# Patient Record
Sex: MALE | Race: WHITE | NOT HISPANIC OR LATINO | Employment: OTHER | URBAN - METROPOLITAN AREA
[De-identification: names, ages, dates, MRNs, and addresses within clinical notes are randomized per-mention and may not be internally consistent; named-entity substitution may affect disease eponyms.]

---

## 2019-05-09 NOTE — NURSING
Total Joint Replacement Questionnaire     Junior Beatty participated in Joint Class May 8    1. Have you ever had a Joint Replacement?   [x]Yes  [] No    2. How many stairs/steps do you have to enter your home? 9  When going up, on what side is the railing?  [] Left  [x] Right  [] Bilateral  [] None    3. Do you own any Durable Medical Equipment?   [x] Rolling Walker  [x] Standard Walker  [x] Rollator  [x] Cane  [] Crutches  [x] Bed Side Commode  [x] Hip Kit  [] Tub Transfer Bench  [x] Shower Chair     4. Have you used a Home Health Company before?   [] Yes  [x] No  If Yes, Name of Company: na  Would you like to use this company again?   [] Yes  [] No  [x] Would you like to use your physician's preference?  [x] Yes  [] No    5. Have you arranged for someone to help you, for at least for 3 days, when you are discharged from the hospital?  [x] Yes  [] No  If Yes, Name(s) of person assisting: Anahi Corrales/Nimo Fong  5/9/2019

## 2019-05-16 ENCOUNTER — HOSPITAL ENCOUNTER (OUTPATIENT)
Dept: PREADMISSION TESTING | Facility: OTHER | Age: 59
Discharge: HOME | End: 2019-05-16
Attending: ORTHOPAEDIC SURGERY
Payer: COMMERCIAL

## 2019-05-16 ENCOUNTER — ANESTHESIA EVENT (OUTPATIENT)
Dept: SURGERY | Facility: OTHER | Age: 59
DRG: 470 | End: 2019-05-16
Payer: COMMERCIAL

## 2019-05-16 VITALS
OXYGEN SATURATION: 95 % | SYSTOLIC BLOOD PRESSURE: 130 MMHG | HEIGHT: 65 IN | HEART RATE: 79 BPM | TEMPERATURE: 97 F | WEIGHT: 209 LBS | DIASTOLIC BLOOD PRESSURE: 86 MMHG | BODY MASS INDEX: 34.82 KG/M2

## 2019-05-16 DIAGNOSIS — M16.11 PRIMARY LOCALIZED OSTEOARTHROSIS OF THE HIP, RIGHT: Primary | ICD-10-CM

## 2019-05-16 LAB
ABO + RH BLD: NORMAL
ALBUMIN SERPL BCP-MCNC: 4.1 G/DL (ref 3.5–5.2)
ALP SERPL-CCNC: 108 U/L (ref 55–135)
ALT SERPL W/O P-5'-P-CCNC: 36 U/L (ref 10–44)
ANION GAP SERPL CALC-SCNC: 7 MMOL/L (ref 8–16)
AST SERPL-CCNC: 29 U/L (ref 10–40)
BASOPHILS # BLD AUTO: 0.02 K/UL (ref 0–0.2)
BASOPHILS NFR BLD: 0.4 % (ref 0–1.9)
BILIRUB SERPL-MCNC: 0.6 MG/DL (ref 0.1–1)
BILIRUB UR QL STRIP: NEGATIVE
BLD GP AB SCN CELLS X3 SERPL QL: NORMAL
BUN SERPL-MCNC: 16 MG/DL (ref 6–20)
CALCIUM SERPL-MCNC: 9.5 MG/DL (ref 8.7–10.5)
CHLORIDE SERPL-SCNC: 108 MMOL/L (ref 95–110)
CLARITY UR: CLEAR
CO2 SERPL-SCNC: 26 MMOL/L (ref 23–29)
COLOR UR: YELLOW
CREAT SERPL-MCNC: 0.7 MG/DL (ref 0.5–1.4)
DIFFERENTIAL METHOD: NORMAL
EOSINOPHIL # BLD AUTO: 0.1 K/UL (ref 0–0.5)
EOSINOPHIL NFR BLD: 2.5 % (ref 0–8)
ERYTHROCYTE [DISTWIDTH] IN BLOOD BY AUTOMATED COUNT: 14 % (ref 11.5–14.5)
EST. GFR  (AFRICAN AMERICAN): >60 ML/MIN/1.73 M^2
EST. GFR  (NON AFRICAN AMERICAN): >60 ML/MIN/1.73 M^2
GLUCOSE SERPL-MCNC: 102 MG/DL (ref 70–110)
GLUCOSE UR QL STRIP: NEGATIVE
HCT VFR BLD AUTO: 45.8 % (ref 40–54)
HGB BLD-MCNC: 15.4 G/DL (ref 14–18)
HGB UR QL STRIP: NEGATIVE
KETONES UR QL STRIP: NEGATIVE
LEUKOCYTE ESTERASE UR QL STRIP: NEGATIVE
LYMPHOCYTES # BLD AUTO: 1.8 K/UL (ref 1–4.8)
LYMPHOCYTES NFR BLD: 32.7 % (ref 18–48)
MCH RBC QN AUTO: 28.6 PG (ref 27–31)
MCHC RBC AUTO-ENTMCNC: 33.6 G/DL (ref 32–36)
MCV RBC AUTO: 85 FL (ref 82–98)
MONOCYTES # BLD AUTO: 0.6 K/UL (ref 0.3–1)
MONOCYTES NFR BLD: 10.1 % (ref 4–15)
NEUTROPHILS # BLD AUTO: 3 K/UL (ref 1.8–7.7)
NEUTROPHILS NFR BLD: 54.1 % (ref 38–73)
NITRITE UR QL STRIP: NEGATIVE
PH UR STRIP: 7 [PH] (ref 5–8)
PLATELET # BLD AUTO: 204 K/UL (ref 150–350)
PMV BLD AUTO: 9.2 FL (ref 9.2–12.9)
POTASSIUM SERPL-SCNC: 4 MMOL/L (ref 3.5–5.1)
PROT SERPL-MCNC: 7 G/DL (ref 6–8.4)
PROT UR QL STRIP: NEGATIVE
RBC # BLD AUTO: 5.38 M/UL (ref 4.6–6.2)
RH BLD: NORMAL
SODIUM SERPL-SCNC: 141 MMOL/L (ref 136–145)
SP GR UR STRIP: <=1.005 (ref 1–1.03)
URN SPEC COLLECT METH UR: ABNORMAL
UROBILINOGEN UR STRIP-ACNC: NEGATIVE EU/DL
WBC # BLD AUTO: 5.54 K/UL (ref 3.9–12.7)

## 2019-05-16 PROCEDURE — 85025 COMPLETE CBC W/AUTO DIFF WBC: CPT

## 2019-05-16 PROCEDURE — 93010 ELECTROCARDIOGRAM REPORT: CPT | Mod: ,,, | Performed by: INTERNAL MEDICINE

## 2019-05-16 PROCEDURE — 81003 URINALYSIS AUTO W/O SCOPE: CPT

## 2019-05-16 PROCEDURE — 36415 COLL VENOUS BLD VENIPUNCTURE: CPT

## 2019-05-16 PROCEDURE — 80053 COMPREHEN METABOLIC PANEL: CPT

## 2019-05-16 PROCEDURE — 93010 EKG 12-LEAD: ICD-10-PCS | Mod: ,,, | Performed by: INTERNAL MEDICINE

## 2019-05-16 PROCEDURE — 86901 BLOOD TYPING SEROLOGIC RH(D): CPT

## 2019-05-16 PROCEDURE — 86850 RBC ANTIBODY SCREEN: CPT

## 2019-05-16 PROCEDURE — 93005 ELECTROCARDIOGRAM TRACING: CPT

## 2019-05-16 RX ORDER — LOSARTAN POTASSIUM 100 MG/1
100 TABLET ORAL DAILY
COMMUNITY
End: 2019-06-27

## 2019-05-16 RX ORDER — HYDROCODONE BITARTRATE AND ACETAMINOPHEN 5; 325 MG/1; MG/1
1 TABLET ORAL EVERY 6 HOURS PRN
Status: ON HOLD | COMMUNITY
End: 2019-05-28 | Stop reason: HOSPADM

## 2019-05-16 RX ORDER — IBUPROFEN 600 MG/1
600 TABLET ORAL
Status: ON HOLD | COMMUNITY
End: 2019-05-28 | Stop reason: HOSPADM

## 2019-05-16 RX ORDER — LIDOCAINE HYDROCHLORIDE 10 MG/ML
0.5 INJECTION, SOLUTION EPIDURAL; INFILTRATION; INTRACAUDAL; PERINEURAL ONCE
Status: CANCELLED | OUTPATIENT
Start: 2019-05-16 | End: 2019-05-16

## 2019-05-16 RX ORDER — FLAXSEED OIL 1000 MG
CAPSULE ORAL
Status: ON HOLD | COMMUNITY
End: 2019-05-28 | Stop reason: HOSPADM

## 2019-05-16 RX ORDER — VITAMIN E 268 MG
400 CAPSULE ORAL DAILY
Status: ON HOLD | COMMUNITY
End: 2019-05-28 | Stop reason: HOSPADM

## 2019-05-16 RX ORDER — IBUPROFEN 100 MG/5ML
1000 SUSPENSION, ORAL (FINAL DOSE FORM) ORAL DAILY
Status: ON HOLD | COMMUNITY
End: 2019-05-28 | Stop reason: HOSPADM

## 2019-05-16 RX ORDER — MIDAZOLAM HYDROCHLORIDE 1 MG/ML
2 INJECTION INTRAMUSCULAR; INTRAVENOUS
Status: CANCELLED | OUTPATIENT
Start: 2019-05-16 | End: 2019-05-16

## 2019-05-16 RX ORDER — ACETAMINOPHEN 500 MG
1000 TABLET ORAL
Status: CANCELLED | OUTPATIENT
Start: 2019-05-16 | End: 2019-05-16

## 2019-05-16 RX ORDER — VITAMIN B COMPLEX
1 CAPSULE ORAL DAILY
Status: ON HOLD | COMMUNITY
End: 2019-05-28 | Stop reason: HOSPADM

## 2019-05-16 RX ORDER — ATORVASTATIN CALCIUM 20 MG/1
20 TABLET, FILM COATED ORAL DAILY
COMMUNITY
End: 2019-06-27

## 2019-05-16 RX ORDER — MELOXICAM 15 MG/1
15 TABLET ORAL DAILY
Status: ON HOLD | COMMUNITY
End: 2019-05-28 | Stop reason: HOSPADM

## 2019-05-16 RX ORDER — AMOXICILLIN 500 MG
1 CAPSULE ORAL DAILY
Status: ON HOLD | COMMUNITY
End: 2019-05-28 | Stop reason: HOSPADM

## 2019-05-16 RX ORDER — GARLIC 1000 MG
CAPSULE ORAL
Status: ON HOLD | COMMUNITY
End: 2019-05-28 | Stop reason: HOSPADM

## 2019-05-16 RX ORDER — TRANDOLAPRIL 2 MG/1
1 TABLET ORAL DAILY
COMMUNITY
End: 2019-06-27

## 2019-05-16 RX ORDER — CHOLECALCIFEROL (VITAMIN D3) 25 MCG
TABLET ORAL DAILY
COMMUNITY
End: 2019-06-27

## 2019-05-16 RX ORDER — SODIUM CHLORIDE, SODIUM LACTATE, POTASSIUM CHLORIDE, CALCIUM CHLORIDE 600; 310; 30; 20 MG/100ML; MG/100ML; MG/100ML; MG/100ML
INJECTION, SOLUTION INTRAVENOUS CONTINUOUS
Status: CANCELLED | OUTPATIENT
Start: 2019-05-16

## 2019-05-16 RX ORDER — PREGABALIN 75 MG/1
75 CAPSULE ORAL
Status: CANCELLED | OUTPATIENT
Start: 2019-05-16 | End: 2019-05-16

## 2019-05-16 RX ORDER — VITAMIN A 3000 MCG
CAPSULE ORAL DAILY
Status: ON HOLD | COMMUNITY
End: 2019-05-28 | Stop reason: HOSPADM

## 2019-05-16 RX ORDER — VERAPAMIL HYDROCHLORIDE 180 MG/1
180 CAPSULE, EXTENDED RELEASE ORAL DAILY
COMMUNITY
End: 2019-06-27

## 2019-05-16 NOTE — DISCHARGE INSTRUCTIONS
PRE-ADMIT TESTING -  [phone]    [address]  [place] [facility]          Your surgery has been scheduled at [facility]. We are pleased to have the opportunity to serve you. For Further Information please call [phone].    On the day of surgery please report to the Information Desk on the 1st floor.    · CONTACT YOUR PHYSICIAN'S OFFICE THE DAY PRIOR TO YOUR SURGERY TO OBTAIN YOUR ARRIVAL TIME.     · The evening before surgery do not eat anything after 9 p.m. ( this includes hard candy, chewing gum and mints).  You may only have GATORADE, POWERADE AND WATER  from 9 p.m. until you leave your home.   DO NOT DRINK ANY LIQUIDS ON THE WAY TO THE HOSPITAL.      SPECIAL MEDICATION INSTRUCTIONS: TAKE medications checked off by the Anesthesiologist on your Medication List.    Angiogram Patients: Take medications as instructed by your physician, including aspirin.     Surgery Patients:    If you take ASPIRIN - Your PHYSICIAN/SURGEON will need to inform you IF/OR when you need to stop taking aspirin prior to your surgery.     Do Not take any medications containing IBUPROFEN.  Do Not Wear any make-up or dark nail polish   (especially eye make-up) to surgery. If you come to surgery with makeup on you will be required to remove the makeup or nail polish.    Do not shave your surgical area at least 5 days prior to your surgery. The surgical prep will be performed at the hospital according to Infection Control regulations.    Leave all valuables at home.   Do Not wear any jewelry or watches, including any metal in body piercings. Jewelry must be removed prior to coming to the hospital.  There is a possibility that rings that are unable to be removed may be cut off if they are on the surgical extremity.    Contact Lens must be removed before surgery. Either do not wear the contact lens or bring a case and solution for storage.  Please bring a container for eyeglasses or dentures as required.  Bring  any paperwork your physician has provided, such as consent forms,  history and physicals, doctor's orders, etc.   Bring comfortable clothes that are loose fitting to wear upon discharge. Take into consideration the type of surgery being performed.  Maintain your diet as advised per your physician the day prior to surgery.      Adequate rest the night before surgery is advised.   Park in the Parking lot behind the hospital or in the [facility] across the street from the parking lot. Parking is complimentary.  If you will be discharged the same day as your procedure, please arrange for a responsible adult to drive you home or to accompany you if traveling by taxi.   YOU WILL NOT BE PERMITTED TO DRIVE OR TO LEAVE THE HOSPITAL ALONE AFTER SURGERY.   It is strongly recommended that you arrange for someone to remain with you for the first 24 hrs following your surgery.       Thank you for your cooperation.  The Staff of [facility].                Bathing Instructions with Hibiclens     Shower the evening before and morning of your procedure with Hibiclens:   Wash your face with water and your regular face wash/soap   Apply Hibiclens directly on your skin or on a wet washcloth and wash gently. When showering: Move away from the shower stream when applying Hibiclens to avoid rinsing off too soon.   Rinse thoroughly with warm water   Do not dilute Hibiclens         Dry off as usual, do not use any deodorant, powder, body lotions, perfume, after shave or cologne.

## 2019-05-16 NOTE — PRE ADMISSION SCREENING
Called patient's PCP, Dr. Flowers, for EKG.   Christi, at Dr. Flowers's office, states does not have any EKG on patient.  Dr Vega, anesthesia, notified of same.  EKG reviewed by Dr. Vega, no additional orders

## 2019-05-16 NOTE — ANESTHESIA PREPROCEDURE EVALUATION
05/16/2019  Junior Beatty is a 58 y.o., male.    Anesthesia Evaluation    I have reviewed the Patient Summary Reports.    I have reviewed the Nursing Notes.   I have reviewed the Medications.     Review of Systems  Anesthesia Hx:  PONV History of prior surgery of interest to airway management or planning: Denies Family Hx of Anesthesia complications.  Personal Hx of Anesthesia complications, Post-Operative Nausea/Vomiting, with every anesthetic, treatment not known   Social:  Non-Smoker    Hematology/Oncology:  Hematology Normal   Oncology Normal     EENT/Dental:EENT/Dental Normal   Cardiovascular:   Hypertension, well controlled ECG has been reviewed. NSR w non sp st t wave changes   Pulmonary:  Pulmonary Normal    Renal/:  Renal/ Normal     Hepatic/GI:  Hepatic/GI Normal    Musculoskeletal:  Musculoskeletal Normal    Neurological:  Neurology Normal    Endocrine:  Endocrine Normal    Dermatological:  Skin Normal    Psych:  Psychiatric Normal           Physical Exam  General:  Well nourished    Airway/Jaw/Neck:  Airway Findings: Mouth Opening: Small, but > 3cm Tongue: Normal  General Airway Assessment: Adult  Mallampati: II      Dental:  Dental Findings: Molar caps      Musculoskeletal:  Musculoskeletal Findings: Congenital Deformity Congenital bone deformity since birth     Mental Status:  Mental Status Findings:  Cooperative, Alert and Oriented         Anesthesia Plan  Type of Anesthesia, risks & benefits discussed:  Anesthesia Type:  spinal  Patient's Preference:   Intra-op Monitoring Plan: standard ASA monitors  Intra-op Monitoring Plan Comments:   Post Op Pain Control Plan: multimodal analgesia  Post Op Pain Control Plan Comments:   Induction:   IV  Beta Blocker:         Informed Consent: Patient understands risks and agrees with Anesthesia plan.  Questions answered. Anesthesia consent signed with  patient.  ASA Score: 2     Day of Surgery Review of History & Physical:    H&P update referred to the surgeon.     Anesthesia Plan Notes: Labs today,plan Spinal    Labs normal        Ready For Surgery From Anesthesia Perspective.

## 2019-05-27 ENCOUNTER — HOSPITAL ENCOUNTER (INPATIENT)
Facility: OTHER | Age: 59
LOS: 2 days | Discharge: NURSING, REHAB OR LONG-TERM CARE | DRG: 470 | End: 2019-05-29
Attending: ORTHOPAEDIC SURGERY | Admitting: ORTHOPAEDIC SURGERY
Payer: COMMERCIAL

## 2019-05-27 ENCOUNTER — ANESTHESIA (OUTPATIENT)
Dept: SURGERY | Facility: OTHER | Age: 59
DRG: 470 | End: 2019-05-27
Payer: COMMERCIAL

## 2019-05-27 DIAGNOSIS — M16.11 PRIMARY OSTEOARTHRITIS OF RIGHT HIP: Primary | ICD-10-CM

## 2019-05-27 DIAGNOSIS — M16.11 PRIMARY LOCALIZED OSTEOARTHRITIS OF RIGHT HIP: ICD-10-CM

## 2019-05-27 PROCEDURE — C1776 JOINT DEVICE (IMPLANTABLE): HCPCS | Performed by: ORTHOPAEDIC SURGERY

## 2019-05-27 PROCEDURE — 25000003 PHARM REV CODE 250: Performed by: NURSE ANESTHETIST, CERTIFIED REGISTERED

## 2019-05-27 PROCEDURE — 97530 THERAPEUTIC ACTIVITIES: CPT

## 2019-05-27 PROCEDURE — 25000003 PHARM REV CODE 250

## 2019-05-27 PROCEDURE — 97116 GAIT TRAINING THERAPY: CPT

## 2019-05-27 PROCEDURE — 63600175 PHARM REV CODE 636 W HCPCS: Performed by: ANESTHESIOLOGY

## 2019-05-27 PROCEDURE — 71000039 HC RECOVERY, EACH ADD'L HOUR: Performed by: ORTHOPAEDIC SURGERY

## 2019-05-27 PROCEDURE — 36000711: Performed by: ORTHOPAEDIC SURGERY

## 2019-05-27 PROCEDURE — 11000001 HC ACUTE MED/SURG PRIVATE ROOM

## 2019-05-27 PROCEDURE — C9290 INJ, BUPIVACAINE LIPOSOME: HCPCS | Performed by: ORTHOPAEDIC SURGERY

## 2019-05-27 PROCEDURE — 94761 N-INVAS EAR/PLS OXIMETRY MLT: CPT

## 2019-05-27 PROCEDURE — 25000003 PHARM REV CODE 250: Performed by: ORTHOPAEDIC SURGERY

## 2019-05-27 PROCEDURE — 25000003 PHARM REV CODE 250: Performed by: ANESTHESIOLOGY

## 2019-05-27 PROCEDURE — 27201423 OPTIME MED/SURG SUP & DEVICES STERILE SUPPLY: Performed by: ORTHOPAEDIC SURGERY

## 2019-05-27 PROCEDURE — 63600175 PHARM REV CODE 636 W HCPCS

## 2019-05-27 PROCEDURE — 36000710: Performed by: ORTHOPAEDIC SURGERY

## 2019-05-27 PROCEDURE — 94799 UNLISTED PULMONARY SVC/PX: CPT

## 2019-05-27 PROCEDURE — C1713 ANCHOR/SCREW BN/BN,TIS/BN: HCPCS | Performed by: ORTHOPAEDIC SURGERY

## 2019-05-27 PROCEDURE — 71000033 HC RECOVERY, INTIAL HOUR: Performed by: ORTHOPAEDIC SURGERY

## 2019-05-27 PROCEDURE — P9045 ALBUMIN (HUMAN), 5%, 250 ML: HCPCS | Mod: JG | Performed by: NURSE ANESTHETIST, CERTIFIED REGISTERED

## 2019-05-27 PROCEDURE — 63600175 PHARM REV CODE 636 W HCPCS: Performed by: NURSE ANESTHETIST, CERTIFIED REGISTERED

## 2019-05-27 PROCEDURE — 37000008 HC ANESTHESIA 1ST 15 MINUTES: Performed by: ORTHOPAEDIC SURGERY

## 2019-05-27 PROCEDURE — 37000009 HC ANESTHESIA EA ADD 15 MINS: Performed by: ORTHOPAEDIC SURGERY

## 2019-05-27 PROCEDURE — 97162 PT EVAL MOD COMPLEX 30 MIN: CPT

## 2019-05-27 PROCEDURE — 63600175 PHARM REV CODE 636 W HCPCS: Performed by: ORTHOPAEDIC SURGERY

## 2019-05-27 DEVICE — STEM FEMORAL 12/14 12X128MM TT: Type: IMPLANTABLE DEVICE | Site: HIP | Status: FUNCTIONAL

## 2019-05-27 DEVICE — LINER POLY 36MM: Type: IMPLANTABLE DEVICE | Site: HIP | Status: FUNCTIONAL

## 2019-05-27 DEVICE — HEAD FEM BIOLOX DELTA 36X-3.5: Type: IMPLANTABLE DEVICE | Site: HIP | Status: FUNCTIONAL

## 2019-05-27 DEVICE — SCREW BONE 6.5X30 SELF-TAP: Type: IMPLANTABLE DEVICE | Site: HIP | Status: FUNCTIONAL

## 2019-05-27 DEVICE — SHELL ACE CLUSTER HOLE 52MM: Type: IMPLANTABLE DEVICE | Site: HIP | Status: FUNCTIONAL

## 2019-05-27 DEVICE — SCREW BONE SELF TAP 6.5X40: Type: IMPLANTABLE DEVICE | Site: HIP | Status: FUNCTIONAL

## 2019-05-27 RX ORDER — OXYCODONE HYDROCHLORIDE 5 MG/1
5 TABLET ORAL EVERY 4 HOURS PRN
Status: DISCONTINUED | OUTPATIENT
Start: 2019-05-27 | End: 2019-05-29 | Stop reason: HOSPADM

## 2019-05-27 RX ORDER — MEPERIDINE HYDROCHLORIDE 25 MG/ML
12.5 INJECTION INTRAMUSCULAR; INTRAVENOUS; SUBCUTANEOUS ONCE AS NEEDED
Status: COMPLETED | OUTPATIENT
Start: 2019-05-27 | End: 2019-05-27

## 2019-05-27 RX ORDER — DIPHENHYDRAMINE HYDROCHLORIDE 50 MG/ML
25 INJECTION INTRAMUSCULAR; INTRAVENOUS EVERY 8 HOURS PRN
Status: DISCONTINUED | OUTPATIENT
Start: 2019-05-27 | End: 2019-05-29 | Stop reason: HOSPADM

## 2019-05-27 RX ORDER — SODIUM CHLORIDE, SODIUM LACTATE, POTASSIUM CHLORIDE, CALCIUM CHLORIDE 600; 310; 30; 20 MG/100ML; MG/100ML; MG/100ML; MG/100ML
INJECTION, SOLUTION INTRAVENOUS CONTINUOUS
Status: DISCONTINUED | OUTPATIENT
Start: 2019-05-27 | End: 2019-05-27

## 2019-05-27 RX ORDER — ACETAMINOPHEN 500 MG
1000 TABLET ORAL
Status: DISCONTINUED | OUTPATIENT
Start: 2019-05-27 | End: 2019-05-27 | Stop reason: SDUPTHER

## 2019-05-27 RX ORDER — LIDOCAINE HCL/PF 100 MG/5ML
SYRINGE (ML) INTRAVENOUS
Status: DISCONTINUED | OUTPATIENT
Start: 2019-05-27 | End: 2019-05-27

## 2019-05-27 RX ORDER — PREGABALIN 75 MG/1
75 CAPSULE ORAL
Status: COMPLETED | OUTPATIENT
Start: 2019-05-27 | End: 2019-05-27

## 2019-05-27 RX ORDER — ONDANSETRON 2 MG/ML
4 INJECTION INTRAMUSCULAR; INTRAVENOUS DAILY PRN
Status: DISCONTINUED | OUTPATIENT
Start: 2019-05-27 | End: 2019-05-27 | Stop reason: HOSPADM

## 2019-05-27 RX ORDER — CEFAZOLIN SODIUM 1 G/3ML
2 INJECTION, POWDER, FOR SOLUTION INTRAMUSCULAR; INTRAVENOUS
Status: COMPLETED | OUTPATIENT
Start: 2019-05-27 | End: 2019-05-27

## 2019-05-27 RX ORDER — BUPIVACAINE HCL/EPINEPHRINE 0.25-.0005
VIAL (ML) INJECTION
Status: DISCONTINUED | OUTPATIENT
Start: 2019-05-27 | End: 2019-05-27 | Stop reason: HOSPADM

## 2019-05-27 RX ORDER — HYDROMORPHONE HYDROCHLORIDE 1 MG/ML
0.5 INJECTION, SOLUTION INTRAMUSCULAR; INTRAVENOUS; SUBCUTANEOUS EVERY 4 HOURS PRN
Status: ACTIVE | OUTPATIENT
Start: 2019-05-27 | End: 2019-05-28

## 2019-05-27 RX ORDER — MUPIROCIN 20 MG/G
1 OINTMENT TOPICAL 2 TIMES DAILY
Status: DISCONTINUED | OUTPATIENT
Start: 2019-05-27 | End: 2019-05-29 | Stop reason: HOSPADM

## 2019-05-27 RX ORDER — TRANEXAMIC ACID 100 MG/ML
INJECTION, SOLUTION INTRAVENOUS
Status: DISCONTINUED | OUTPATIENT
Start: 2019-05-27 | End: 2019-05-27

## 2019-05-27 RX ORDER — OXYCODONE HYDROCHLORIDE 5 MG/1
10 TABLET ORAL EVERY 4 HOURS PRN
Status: DISCONTINUED | OUTPATIENT
Start: 2019-05-27 | End: 2019-05-29 | Stop reason: HOSPADM

## 2019-05-27 RX ORDER — SODIUM CHLORIDE 0.9 % (FLUSH) 0.9 %
10 SYRINGE (ML) INJECTION
Status: DISCONTINUED | OUTPATIENT
Start: 2019-05-27 | End: 2019-05-29 | Stop reason: HOSPADM

## 2019-05-27 RX ORDER — POLYETHYLENE GLYCOL 3350 17 G/17G
17 POWDER, FOR SOLUTION ORAL DAILY
Status: DISCONTINUED | OUTPATIENT
Start: 2019-05-27 | End: 2019-05-29 | Stop reason: HOSPADM

## 2019-05-27 RX ORDER — ALBUMIN HUMAN 50 G/1000ML
SOLUTION INTRAVENOUS CONTINUOUS PRN
Status: DISCONTINUED | OUTPATIENT
Start: 2019-05-27 | End: 2019-05-27

## 2019-05-27 RX ORDER — FAMOTIDINE 20 MG/1
20 TABLET, FILM COATED ORAL 2 TIMES DAILY
Status: DISCONTINUED | OUTPATIENT
Start: 2019-05-27 | End: 2019-05-29 | Stop reason: HOSPADM

## 2019-05-27 RX ORDER — HYDROMORPHONE HYDROCHLORIDE 2 MG/ML
0.4 INJECTION, SOLUTION INTRAMUSCULAR; INTRAVENOUS; SUBCUTANEOUS EVERY 5 MIN PRN
Status: DISCONTINUED | OUTPATIENT
Start: 2019-05-27 | End: 2019-05-27 | Stop reason: HOSPADM

## 2019-05-27 RX ORDER — CELECOXIB 200 MG/1
200 CAPSULE ORAL 2 TIMES DAILY
Status: DISCONTINUED | OUTPATIENT
Start: 2019-05-27 | End: 2019-05-29 | Stop reason: HOSPADM

## 2019-05-27 RX ORDER — ACETAMINOPHEN 325 MG/1
650 TABLET ORAL EVERY 6 HOURS PRN
Status: DISCONTINUED | OUTPATIENT
Start: 2019-05-28 | End: 2019-05-29 | Stop reason: HOSPADM

## 2019-05-27 RX ORDER — CHOLECALCIFEROL (VITAMIN D3) 25 MCG
1000 TABLET ORAL DAILY
Status: DISCONTINUED | OUTPATIENT
Start: 2019-05-28 | End: 2019-05-29 | Stop reason: HOSPADM

## 2019-05-27 RX ORDER — OXYCODONE HYDROCHLORIDE 5 MG/1
5 TABLET ORAL
Status: DISCONTINUED | OUTPATIENT
Start: 2019-05-27 | End: 2019-05-27 | Stop reason: HOSPADM

## 2019-05-27 RX ORDER — FENTANYL CITRATE 50 UG/ML
INJECTION, SOLUTION INTRAMUSCULAR; INTRAVENOUS
Status: DISCONTINUED | OUTPATIENT
Start: 2019-05-27 | End: 2019-05-27

## 2019-05-27 RX ORDER — ROPIVACAINE HYDROCHLORIDE 5 MG/ML
INJECTION, SOLUTION EPIDURAL; INFILTRATION; PERINEURAL
Status: COMPLETED | OUTPATIENT
Start: 2019-05-27 | End: 2019-05-27

## 2019-05-27 RX ORDER — SODIUM CHLORIDE 0.9 % (FLUSH) 0.9 %
3 SYRINGE (ML) INJECTION
Status: DISCONTINUED | OUTPATIENT
Start: 2019-05-27 | End: 2019-05-29 | Stop reason: HOSPADM

## 2019-05-27 RX ORDER — DOCUSATE SODIUM 100 MG/1
100 CAPSULE, LIQUID FILLED ORAL EVERY 12 HOURS
Status: DISCONTINUED | OUTPATIENT
Start: 2019-05-27 | End: 2019-05-29 | Stop reason: HOSPADM

## 2019-05-27 RX ORDER — BACITRACIN 50000 [IU]/1
INJECTION, POWDER, FOR SOLUTION INTRAMUSCULAR
Status: DISCONTINUED | OUTPATIENT
Start: 2019-05-27 | End: 2019-05-27 | Stop reason: HOSPADM

## 2019-05-27 RX ORDER — NEOSTIGMINE METHYLSULFATE 1 MG/ML
INJECTION, SOLUTION INTRAVENOUS
Status: DISCONTINUED | OUTPATIENT
Start: 2019-05-27 | End: 2019-05-27

## 2019-05-27 RX ORDER — BISACODYL 10 MG
10 SUPPOSITORY, RECTAL RECTAL DAILY PRN
Status: DISCONTINUED | OUTPATIENT
Start: 2019-05-27 | End: 2019-05-29 | Stop reason: HOSPADM

## 2019-05-27 RX ORDER — LIDOCAINE HYDROCHLORIDE 10 MG/ML
0.5 INJECTION, SOLUTION EPIDURAL; INFILTRATION; INTRACAUDAL; PERINEURAL ONCE
Status: DISCONTINUED | OUTPATIENT
Start: 2019-05-27 | End: 2019-05-27 | Stop reason: HOSPADM

## 2019-05-27 RX ORDER — GLYCOPYRROLATE 0.2 MG/ML
INJECTION INTRAMUSCULAR; INTRAVENOUS
Status: DISCONTINUED | OUTPATIENT
Start: 2019-05-27 | End: 2019-05-27

## 2019-05-27 RX ORDER — MIDAZOLAM HYDROCHLORIDE 1 MG/ML
2 INJECTION INTRAMUSCULAR; INTRAVENOUS
Status: COMPLETED | OUTPATIENT
Start: 2019-05-27 | End: 2019-05-27

## 2019-05-27 RX ORDER — VERAPAMIL HYDROCHLORIDE 180 MG/1
180 TABLET, FILM COATED, EXTENDED RELEASE ORAL DAILY
Status: DISCONTINUED | OUTPATIENT
Start: 2019-05-28 | End: 2019-05-29 | Stop reason: HOSPADM

## 2019-05-27 RX ORDER — PHENYLEPHRINE HYDROCHLORIDE 10 MG/ML
INJECTION INTRAVENOUS
Status: DISCONTINUED | OUTPATIENT
Start: 2019-05-27 | End: 2019-05-27

## 2019-05-27 RX ORDER — DEXAMETHASONE SODIUM PHOSPHATE 4 MG/ML
INJECTION, SOLUTION INTRA-ARTICULAR; INTRALESIONAL; INTRAMUSCULAR; INTRAVENOUS; SOFT TISSUE
Status: DISCONTINUED | OUTPATIENT
Start: 2019-05-27 | End: 2019-05-27

## 2019-05-27 RX ORDER — ASPIRIN 325 MG
325 TABLET ORAL EVERY 12 HOURS
Status: DISCONTINUED | OUTPATIENT
Start: 2019-05-28 | End: 2019-05-29 | Stop reason: HOSPADM

## 2019-05-27 RX ORDER — ROCURONIUM BROMIDE 10 MG/ML
INJECTION, SOLUTION INTRAVENOUS
Status: DISCONTINUED | OUTPATIENT
Start: 2019-05-27 | End: 2019-05-27

## 2019-05-27 RX ORDER — ACETAMINOPHEN 500 MG
1000 TABLET ORAL
Status: COMPLETED | OUTPATIENT
Start: 2019-05-27 | End: 2019-05-27

## 2019-05-27 RX ORDER — ATORVASTATIN CALCIUM 20 MG/1
20 TABLET, FILM COATED ORAL DAILY
Status: DISCONTINUED | OUTPATIENT
Start: 2019-05-28 | End: 2019-05-29 | Stop reason: HOSPADM

## 2019-05-27 RX ORDER — KETOROLAC TROMETHAMINE 30 MG/ML
INJECTION, SOLUTION INTRAMUSCULAR; INTRAVENOUS
Status: DISCONTINUED | OUTPATIENT
Start: 2019-05-27 | End: 2019-05-27 | Stop reason: HOSPADM

## 2019-05-27 RX ORDER — ONDANSETRON 2 MG/ML
INJECTION INTRAMUSCULAR; INTRAVENOUS
Status: DISCONTINUED | OUTPATIENT
Start: 2019-05-27 | End: 2019-05-27

## 2019-05-27 RX ORDER — LOSARTAN POTASSIUM 50 MG/1
100 TABLET ORAL DAILY
Status: DISCONTINUED | OUTPATIENT
Start: 2019-05-28 | End: 2019-05-29 | Stop reason: HOSPADM

## 2019-05-27 RX ORDER — PROPOFOL 10 MG/ML
VIAL (ML) INTRAVENOUS
Status: DISCONTINUED | OUTPATIENT
Start: 2019-05-27 | End: 2019-05-27

## 2019-05-27 RX ORDER — DEXTROSE MONOHYDRATE AND SODIUM CHLORIDE 5; .9 G/100ML; G/100ML
INJECTION, SOLUTION INTRAVENOUS CONTINUOUS
Status: DISCONTINUED | OUTPATIENT
Start: 2019-05-27 | End: 2019-05-29 | Stop reason: HOSPADM

## 2019-05-27 RX ORDER — ACETAMINOPHEN 500 MG
1000 TABLET ORAL EVERY 8 HOURS
Status: DISPENSED | OUTPATIENT
Start: 2019-05-27 | End: 2019-05-28

## 2019-05-27 RX ORDER — CEFAZOLIN SODIUM 2 G/50ML
2 SOLUTION INTRAVENOUS
Status: COMPLETED | OUTPATIENT
Start: 2019-05-27 | End: 2019-05-28

## 2019-05-27 RX ORDER — ONDANSETRON 2 MG/ML
8 INJECTION INTRAMUSCULAR; INTRAVENOUS EVERY 8 HOURS PRN
Status: DISCONTINUED | OUTPATIENT
Start: 2019-05-27 | End: 2019-05-29 | Stop reason: HOSPADM

## 2019-05-27 RX ORDER — EPHEDRINE SULFATE 50 MG/ML
INJECTION, SOLUTION INTRAVENOUS
Status: DISCONTINUED | OUTPATIENT
Start: 2019-05-27 | End: 2019-05-27

## 2019-05-27 RX ADMIN — POLYETHYLENE GLYCOL 3350 17 G: 17 POWDER, FOR SOLUTION ORAL at 03:05

## 2019-05-27 RX ADMIN — EPHEDRINE SULFATE 10 MG: 50 INJECTION INTRAMUSCULAR; INTRAVENOUS; SUBCUTANEOUS at 09:05

## 2019-05-27 RX ADMIN — HYDROMORPHONE HYDROCHLORIDE 0.4 MG: 2 INJECTION, SOLUTION INTRAMUSCULAR; INTRAVENOUS; SUBCUTANEOUS at 12:05

## 2019-05-27 RX ADMIN — DEXAMETHASONE SODIUM PHOSPHATE 8 MG: 4 INJECTION, SOLUTION INTRAMUSCULAR; INTRAVENOUS at 10:05

## 2019-05-27 RX ADMIN — LIDOCAINE HYDROCHLORIDE 50 MG: 20 INJECTION, SOLUTION INTRAVENOUS at 10:05

## 2019-05-27 RX ADMIN — PHENYLEPHRINE HYDROCHLORIDE 100 MCG: 10 INJECTION INTRAVENOUS at 11:05

## 2019-05-27 RX ADMIN — GLYCOPYRROLATE 0.2 MG: 0.2 INJECTION, SOLUTION INTRAMUSCULAR; INTRAVENOUS at 10:05

## 2019-05-27 RX ADMIN — ACETAMINOPHEN 1000 MG: 500 TABLET ORAL at 09:05

## 2019-05-27 RX ADMIN — EPHEDRINE SULFATE 10 MG: 50 INJECTION INTRAMUSCULAR; INTRAVENOUS; SUBCUTANEOUS at 11:05

## 2019-05-27 RX ADMIN — FAMOTIDINE 20 MG: 20 TABLET, FILM COATED ORAL at 09:05

## 2019-05-27 RX ADMIN — DEXTROSE 1500 MG: 5 SOLUTION INTRAVENOUS at 09:05

## 2019-05-27 RX ADMIN — GLYCOPYRROLATE 0.2 MG: 0.2 INJECTION, SOLUTION INTRAMUSCULAR; INTRAVENOUS at 11:05

## 2019-05-27 RX ADMIN — ACETAMINOPHEN 1000 MG: 500 TABLET, FILM COATED ORAL at 08:05

## 2019-05-27 RX ADMIN — GLYCOPYRROLATE 0.6 MG: 0.2 INJECTION, SOLUTION INTRAMUSCULAR; INTRAVENOUS at 11:05

## 2019-05-27 RX ADMIN — SODIUM CHLORIDE, SODIUM LACTATE, POTASSIUM CHLORIDE, AND CALCIUM CHLORIDE: 600; 310; 30; 20 INJECTION, SOLUTION INTRAVENOUS at 09:05

## 2019-05-27 RX ADMIN — SODIUM CHLORIDE, SODIUM LACTATE, POTASSIUM CHLORIDE, AND CALCIUM CHLORIDE: 600; 310; 30; 20 INJECTION, SOLUTION INTRAVENOUS at 11:05

## 2019-05-27 RX ADMIN — EPHEDRINE SULFATE 10 MG: 50 INJECTION INTRAMUSCULAR; INTRAVENOUS; SUBCUTANEOUS at 10:05

## 2019-05-27 RX ADMIN — ALBUMIN (HUMAN): 2.5 SOLUTION INTRAVENOUS at 11:05

## 2019-05-27 RX ADMIN — MUPIROCIN 1 G: 20 OINTMENT TOPICAL at 09:05

## 2019-05-27 RX ADMIN — FENTANYL CITRATE 50 MCG: 50 INJECTION, SOLUTION INTRAMUSCULAR; INTRAVENOUS at 10:05

## 2019-05-27 RX ADMIN — CELECOXIB 200 MG: 200 CAPSULE ORAL at 09:05

## 2019-05-27 RX ADMIN — PHENYLEPHRINE HYDROCHLORIDE 100 MCG: 10 INJECTION INTRAVENOUS at 10:05

## 2019-05-27 RX ADMIN — CEFAZOLIN 2 G: 330 INJECTION, POWDER, FOR SOLUTION INTRAMUSCULAR; INTRAVENOUS at 10:05

## 2019-05-27 RX ADMIN — MEPERIDINE HYDROCHLORIDE 12.5 MG: 25 INJECTION INTRAMUSCULAR; INTRAVENOUS; SUBCUTANEOUS at 11:05

## 2019-05-27 RX ADMIN — ROPIVACAINE HYDROCHLORIDE 3 ML: 5 INJECTION, SOLUTION EPIDURAL; INFILTRATION; PERINEURAL at 09:05

## 2019-05-27 RX ADMIN — OXYCODONE HYDROCHLORIDE 10 MG: 5 TABLET ORAL at 03:05

## 2019-05-27 RX ADMIN — PREGABALIN 75 MG: 75 CAPSULE ORAL at 08:05

## 2019-05-27 RX ADMIN — DEXTROSE AND SODIUM CHLORIDE: 5; .9 INJECTION, SOLUTION INTRAVENOUS at 02:05

## 2019-05-27 RX ADMIN — SODIUM CHLORIDE, SODIUM LACTATE, POTASSIUM CHLORIDE, AND CALCIUM CHLORIDE: 600; 310; 30; 20 INJECTION, SOLUTION INTRAVENOUS at 10:05

## 2019-05-27 RX ADMIN — PROPOFOL 150 MG: 10 INJECTION, EMULSION INTRAVENOUS at 10:05

## 2019-05-27 RX ADMIN — CEFAZOLIN SODIUM 2 G: 2 SOLUTION INTRAVENOUS at 06:05

## 2019-05-27 RX ADMIN — NEOSTIGMINE METHYLSULFATE 3 MG: 1 INJECTION INTRAVENOUS at 11:05

## 2019-05-27 RX ADMIN — ONDANSETRON 4 MG: 2 INJECTION INTRAMUSCULAR; INTRAVENOUS at 10:05

## 2019-05-27 RX ADMIN — CARBOXYMETHYLCELLULOSE SODIUM 2 DROP: 2.5 SOLUTION/ DROPS OPHTHALMIC at 10:05

## 2019-05-27 RX ADMIN — ROCURONIUM BROMIDE 40 MG: 10 INJECTION, SOLUTION INTRAVENOUS at 10:05

## 2019-05-27 RX ADMIN — MIDAZOLAM HYDROCHLORIDE 2 MG: 1 INJECTION, SOLUTION INTRAMUSCULAR; INTRAVENOUS at 09:05

## 2019-05-27 RX ADMIN — TRANEXAMIC ACID 2000 MG: 100 INJECTION, SOLUTION INTRAVENOUS at 10:05

## 2019-05-27 RX ADMIN — DOCUSATE SODIUM 100 MG: 100 CAPSULE, LIQUID FILLED ORAL at 09:05

## 2019-05-27 RX ADMIN — VANCOMYCIN HYDROCHLORIDE 1500 MG: 1 INJECTION, POWDER, LYOPHILIZED, FOR SOLUTION INTRAVENOUS at 09:05

## 2019-05-27 NOTE — PLAN OF CARE
Problem: Physical Therapy Goal  Goal: Physical Therapy Goal  Goals to be met by: 6/3/19     Patient will increase functional independence with mobility by performin. Supine to sit with supervision.   2. Sit to supine with supervision.   3. Sit<>stand transfer with supervision using rolling walker.   4. Gait > 150 feet with SBA using rolling walker.   5. Ascend/descend 9 stairs with right handrails with CGA with least restrictive AD.    Patient evaluated by PT and goals established. Patient with arthrogryposis with flexion contractures of B elbows, decreased mobility/fine motor of B hands, and leg length discrepancy (L LE shorter than R). Patient able to verbalize posterior hip precautions without VCs. Requires modA for supine>sit with assistance at trunk and pt pull to sit. Sit<>stand with modA progressing to Tiny with RW and use of hospital bed rail (pt unable to reach bed surface or armrest of chair for UE support with sitting or standing). Ambulated x30 ft with RW and CGA. PT will continue to follow and progress as tolerated. Please see progress note for detailed plan of care and recommendations.

## 2019-05-27 NOTE — ANESTHESIA PROCEDURE NOTES
Spinal    Diagnosis: OA   Patient location during procedure: holding area  End time: 5/27/2019 9:51 AM  Staffing  Anesthesiologist: Danny Armendariz MD  Performed: anesthesiologist   Preanesthetic Checklist  Completed: patient identified, site marked, surgical consent, pre-op evaluation, timeout performed, IV checked, risks and benefits discussed and monitors and equipment checked  Spinal Block  Patient position: sitting  Prep: ChloraPrep  Patient monitoring: heart rate, cardiac monitor, continuous pulse ox and frequent blood pressure checks  Approach: right paramedian  Location: L2-3  Injection technique: single shot  CSF Fluid: clear free-flowing CSF  Needle  Needle type: pencil-tip   Needle gauge: 22 G  Needle length: 3.5 in  Additional Documentation: incremental injection, negative aspiration for heme and no paresthesia on injection  Needle localization: anatomical landmarks  Assessment  Patient's tolerance of the procedure: comfortable throughout block and no complaints

## 2019-05-27 NOTE — OR NURSING
B/P systolic in the  90's, Dr Armendariz made aware. Asymptomatic, no orders given, released from PACU.

## 2019-05-27 NOTE — TRANSFER OF CARE
"Anesthesia Transfer of Care Note    Patient: Junior Beatty    Procedure(s) Performed: Procedure(s) (LRB):  ARTHROPLASTY, HIP (Right)    Patient location: PACU    Anesthesia Type: general    Transport from OR: Transported from OR on 2-3 L/min O2 by NC with adequate spontaneous ventilation    Post pain: adequate analgesia    Post assessment: no apparent anesthetic complications    Post vital signs: stable    Level of consciousness: awake    Nausea/Vomiting: no nausea/vomiting    Complications: none    Transfer of care protocol was followed      Last vitals:   Visit Vitals  BP (!) 171/98 (BP Location: Right arm, Patient Position: Sitting)   Pulse 62   Temp 36.6 °C (97.9 °F) (Oral)   Resp 16   Ht 5' 5" (1.651 m)   Wt 93.4 kg (206 lb)   SpO2 (!) 94%   BMI 34.28 kg/m²     "

## 2019-05-27 NOTE — PLAN OF CARE
Spoke with Mayi from [facility] - patient is under review - they do have available beds - Mayi reached out to patient's insurance but they are closed today due to holiday - Mayi will visit patient at bedside tomorrow am, speak with insurance & [facility] MD to get approval to admit hopefully tomorrow

## 2019-05-27 NOTE — OP NOTE
[facility]  Operative Report    SUMMARY     Surgery Date: 5/27/2019     Surgeon(s) and Role:     * Willian Covarrubias MD - Primary    Assistant: LUIS CARLOS Walker FA    Pre-op Diagnosis:  1. Primary localized osteoarthrosis of right hip [M16.11]                                   2. Arthrogryposis    Post-op Diagnosis:  Post-Op Diagnosis Codes:     1. Primary localized osteoarthrosis of right hip [M16.11]    2.  Arthrogryposis    Procedure(s) (LRB):  ARTHROPLASTY, HIP (Right) (Siobhan TM hip)    Anesthesia: Spinal    Description of Procedure: Appropriate consent was signed. The patient understood   and accepted all risks and complications. The patient was brought to the Operating Room after undergoing spinal anesthetic. Chavez catheter was placed. The patient was then placed in a lateral decubitus position on a peg board with all bony   prominences padded. Perineal drape was applied. The Operative hip and lower extremity were then prepped and draped in a sterile manner and a mini posterior approach was utilized. Dissection was taken down to fascia, which was incised and   gluteus cristi muscle split in line of its fibers.The Charnley retractor was then   placed and the hip internally rotated. The external rotators and capsule were   taken off as one flap and peeled back to protect the sciatic nerve. It was   tagged with #5 Ethibond suture. Leg was then levelled and 2 pins were placed, 1  in the greater trochanter and 1 in the iliac crest and distance measured 7.7  cm. Pin was then removed from greater trochanter and hip dislocated. A femoral  neck osteotomy was performed in 15-20 mm above the lesser trochanter as   templated on preoperative x-rays. Femoral head was removed and proximal femur   was exposed. It was opened up with the cookie cutter, starter reamer and   lateralizing reamer. Trabecular metal reamers were utilized up to 12 mm and   broaching was performed to 12 mm. A thrombin-soaked sponge is  placed in the   proximal femur and attention was then directed to the acetabulum.    Labrum and soft tissue were excised and reaming was begun with a 46 mm reamer   and progressed to 52 mm. A 52 mm press-fit trabecular metal cup with cluster   holes was then impacted obtaining excellent fixation. 2 dome screws were placed  enhancing fixation. A 36 mm neutral highly cross-linked polyethylene liner was  then snapped in the place and checked for loosening. Osteophytes were removed   from around the acetabulum.    Attention was then directed back to the proximal femur where the trial 12 mm   trabecular metal broach was placed and trials were performed. A standard neck   was required along with a 36 mm head -3.5 mm neck. Leg length measured 7.7   cm. Broach is then removed and a 12 mm  trabecular metal stem was then impacted in the proximal femur obtaining excellent fixation. A 36 mm   Ceramic head -3.5 mm neck was then impacted on the dried trunnion   relocated brought through full range of motion and found to be quite stable.   The hip was then washed out copiously with antibiotic solution through the   Pulsavac. The external rotators and capsule were reattached to trochanteric   attachment through drill holes utilizing #5 Ethibond suture. Exparel cocktail   was injected into the fascia and subcutaneous tissue. Fascia was closed with a   running #2 Quill suture over a drain. Subcutaneous closure was obtained with #1 and 2-0   Vicryl. Skin was then closed with staples and a sterile compressive dressing   was applied. The patient was placed in abduction pillow and brought to Recovery  Room in good condition.    Estimated Blood Loss:  300 cc         Specimens:   Specimen (12h ago, onward)    None

## 2019-05-27 NOTE — PLAN OF CARE
Problem: Adult Inpatient Plan of Care  Goal: Plan of Care Review  Outcome: Ongoing (interventions implemented as appropriate)  Pt received on room air with adequate saturation. IS instruct completed and achieved volume >3000ml.

## 2019-05-27 NOTE — PT/OT/SLP EVAL
Physical Therapy Evaluation and Treatment    Patient Name:  Junior Beatty   MRN:  [id number]    Recommendations:     Discharge Recommendations:  TBD  Discharge Equipment Recommendations: (Has recommended equipment)   Barriers to discharge: Inaccessible home and Decreased caregiver support    Assessment:     Junior Beatty is a 58 y.o. male admitted with a medical diagnosis of Primary localized osteoarthritis of right hip s/p R LEAH POD#0. Pmhx significant for arthrogryposis with related UE and LE surgeries (most performed in infancy), L LEAH '93 with reconstruction 2002), and HTN.  He presents with the following impairments/functional limitations:  weakness, impaired self care skills, impaired functional mobilty, gait instability, impaired balance, decreased upper extremity function, decreased lower extremity function, pain, impaired skin, edema, orthopedic precautions.    Patient evaluated by PT; presents to therapy as motivated and medically educated with regards to his disorder. Patient with arthrogryposis with flexion contractures of B elbows, decreased mobility/fine motor of B hands, and leg length discrepancy (L LE shorter than R). Patient able to verbalize posterior hip precautions without VCs. Requires modA for supine>sit with assistance at trunk and pt pull to sit. Sit<>stand with modA progressing to Tiny with RW and use of hospital bed rail (pt unable to reach bed surface or armrest of chair for UE support with sitting or standing). Ambulated x30 ft with RW and CGA. PT will continue to follow and progress as tolerated.     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 There is expectation of returning to prior level of function to maintain independence avoiding readmission.     Pt  "is pleasant and motivated to return to prior level of function.       Rehab Prognosis: Good; patient would benefit from acute PT services to address these deficits and reach maximum level of function.    Recent Surgery: Procedure(s) (LRB):  ARTHROPLASTY, HIP (Right) Day of Surgery    Plan:     During this hospitalization, patient to be seen BID to address the identified rehab impairments via gait training, therapeutic activities, therapeutic exercises and progress toward the following goals:    · Plan of Care Expires:  06/03/19    Subjective     Chief Complaint: Pain in hip  Patient/Family Comments/goals: To be able to figure out how to get out of bed (he rolls onto R side to push into sitting)  Pain/Comfort:  · Pain Rating 1: 4/10  · Location - Side 1: Right  · Location - Orientation 1: lateral  · Location 1: hip  · Pain Addressed 1: Pre-medicate for activity, Reposition, Distraction, Other (see comments)(amb; ice applied)  · Pain Rating Post-Intervention 1: 4/10(Increased to 6/10 with amb (in stance))    Patients cultural, spiritual, Uatsdin conflicts given the current situation: no      Living Environment:  · Pt lives alone in a single story home with 9 steps to enter and R handrail(s). He owns 2 dogs and 1 cat.    · Upon discharge, patient will have assistance from his girlfriend, mother, and cousin and cousin-in-law PRN.  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  Patient found HOB elevated with peripheral IV, oxygen, hemovac, cryotherapy, waite catheter  upon PT entry to room. Patient agreeable to evaluation.      General Precautions: Standard, (fall risk)   Orthopedic Precautions:RLE weight bearing as tolerated, RLE posterior precautions   Braces: N/A     Patient donned yellow socks and gait belt for OOB mobility.    Exams:  · Cognition:   · Patient is oriented to name, , date, place, situation.  · Pt follows approximately 100% of one and two step commands.    · Mood: Pleasant and cooperative.   · Musculoskeletal:  · Posture: Protective guarding surgical joint  · LE ROM/Strength: 5/5 bilateral ankle dorsiflexion and plantarflexion, nonsurgical hip flexion and extension, and non surgical knee flexion and extension. Surgical hip flexion/extension and knee flexion/ext grossly 3+/5 but formal MMT deferred at this time. AROM surgical hip flexion limited to 90 degrees. AROM nonsurgical extremity WFL.  · Neuromuscular:  · Sensation: Intact to light touch bilateral LEs. Pt denied paresthesias.   · Coordination/Tone/Reflexes: No impairments identified with functional mobility. No formal testing performed.   · Balance: CGA for dynamic standing with bilateral UE support.   · Visual-vestibular: No impairments identified with functional mobility. No formal testing performed.  · Integument:  Visible skin intact and surgical extremity dressing clean and dry.   · Cardiopulmonary:  · Vital signs: on room air  · Pre(supine): HR 65 SpO2 91%  · During (sitting EOB): HR 70 SpO2 92%  · Post (sitting after gait): HR 70 SpO2 97%  · Edema: Mild surgical extremity.    · Color/temperature/pulses: Equal      Functional Mobility:  · Bed Mobility:     · Supine to Sit: moderate assistance  · Transfers:    · Sit to Stand: moderate assistance progressing to minimum assistance with rolling walker  · Pt unable to use UE  support on bed surface or armrests 2/2 UE limitations, uses bed rail or cabinet for elevated support surface  · Instructed in maintaining <90 deg hip flexion, pt able to perform with min verbal and tacile cues  · Gait: x30 ft with RW and CGA.   · Demo of gait with RW provided and verbal cues continued during gait bout  · Pt with decreased stance time of RLE, decreased stride length of LLE, and decreased knee/hip flexion BLE.  · Pt reports leg length discrepancy (L shorter than R) and had a limp since childhood      Therapeutic Activities and Exercises:  ·  Pt performed supine therapeutic exercises including ankle pumps, quad sets, glute sets, SAQs, heel slides x 10 reps with verbal and tactile cues.   · Reviewed orthopedic precautions: posterior hip with patient. Patient verbalized understanding. Orthopedic precautions reinforced during session with verbal and tactile cues.   · Supine>sit with pull to sit, sit<>stand with RW sized for pt's comfort 2/2 UE limitations, gait with RW      AM-PAC 6 CLICK MOBILITY  Total Score:16     Patient left up in chair with all lines intact, call button in reach, RN Robert notified and ice applied to lateral surgical hip with layers between ice and skin.    GOALS:   Multidisciplinary Problems     Physical Therapy Goals        Problem: Physical Therapy Goal    Goal Priority Disciplines Outcome Goal Variances Interventions   Physical Therapy Goal     PT, PT/OT      Description:  Goals to be met by: 6/3/19     Patient will increase functional independence with mobility by performin. Supine to sit with supervision.   2. Sit to supine with supervision.   3. Sit<>stand transfer with supervision using rolling walker.   4. Gait > 150 feet with SBA using rolling walker.   5. Ascend/descend 9 stairs with right handrails with CGA with least restrictive AD.                      History:     Past Medical History:   Diagnosis Date    Arthritis     Arthrogryposis     Hypertension         Past Surgical History:   Procedure Laterality Date    ANKLE SURGERY Left 1964    Klever procedure    APPLICATION OF SPICA CAST  1962-63    COLONOSCOPY  2014    ELBOW SURGERY Bilateral 1965    FRACTURE SURGERY Left 1966    HEMORRHOID SURGERY  2009    JOINT REPLACEMENT Left 1993    w/reconstruction 2002    KNEE ARTHROSCOPY Right 1986    TONSILLECTOMY      wrist release Bilateral 1965       Time Tracking:     PT Received On: 05/27/19  PT Start Time: 1519     PT Stop Time: 1610  PT Total Time (min): 51 min     6 min of session not billed; pt with Father providing communion and MD consult     Billable Minutes: Evaluation 20, Gait Training 15 and Therapeutic Activity 10      Lianet Freeman, PT  05/27/2019

## 2019-05-27 NOTE — INTERVAL H&P NOTE
The patient has been examined and the H&P has been reviewed:    I concur with the findings and no changes have occurred since H&P was written.    Anesthesia/Surgery risks, benefits and alternative options discussed and understood by patient/family.          Active Hospital Problems    Diagnosis  POA    Primary localized osteoarthritis of right hip [M16.11]  Yes      Resolved Hospital Problems   No resolved problems to display.

## 2019-05-27 NOTE — PLAN OF CARE
ROBERT met with pt at bedside to complete discharge assessment, verified PCP and uses [facility] [address].  Pt has RW, BSC, SC, hip kit, straight cane, grab bars toilet and tub.  Pt stated discharge plan is [facility]; ROBERT gave pt a list of IRF, in case [facility] doesn't have availability, pt will know the other IRF options.  Pt signed choice form.      05/27/19 6792   Discharge Assessment   Assessment Type Discharge Planning Assessment   Confirmed/corrected address and phone number on facesheet? Yes   Assessment information obtained from? Patient   Communicated expected length of stay with patient/caregiver no   Prior to hospitilization cognitive status: Alert/Oriented   Prior to hospitalization functional status: Assistive Equipment;Independent   Current cognitive status: Alert/Oriented   Current Functional Status: Assistive Equipment;Needs Assistance   Lives With alone   Able to Return to Prior Arrangements yes   Is patient able to care for self after discharge? Unable to determine at this time (comments)   Readmission Within the Last 30 Days no previous admission in last 30 days   Patient currently being followed by outpatient case management? No   Patient currently receives any other outside agency services? No   Equipment Currently Used at Home bedside commode;walker, rolling;hip kit;cane, straight;grab bar   Do you have any problems affording any of your prescribed medications? No   Is the patient taking medications as prescribed? yes   Does the patient have transportation home? Yes   Transportation Anticipated family or friend will provide   Does the patient receive services at the [facility]? No   Discharge Plan A Rehab   Patient/Family in Agreement with Plan yes

## 2019-05-27 NOTE — CONSULTS
"Internal Medicine  Consult Note    Admit Date: 5/27/2019   LOS: 0 days     SUBJECTIVE:     Follow-up For:  Medical Management    HPI:  59yo M with arthrogryposis, HTN, and severe OA admitted for R LEAH.  Pain controlled, tolerating po.  He has just finished working with PT.    Review of Systems:   Constitutional: no fevers, chills, appetite or weight changes  Respiratory: no cough or shortness of breath   Cardiovascular: no chest pain or palpitations   Gastrointestinal: no nausea or vomiting, no abdominal pain or change in bowel habits   Genitourinary: no hematuria or dysuria   Musculoskeletal: no arthralgias or myalgias   Integumentary: no rashes or lesions  Neurological: no seizures or tremors    Past Medical History:   Diagnosis Date    Arthritis     Arthrogryposis     Hypertension      Past Surgical History:   Procedure Laterality Date    ANKLE SURGERY Left 1964    Klever procedure    APPLICATION OF SPICA CAST  1962-63    COLONOSCOPY  2014    ELBOW SURGERY Bilateral 1965    FRACTURE SURGERY Left 1966    HEMORRHOID SURGERY  2009    JOINT REPLACEMENT Left 1993    w/reconstruction 2002    KNEE ARTHROSCOPY Right 1986    TONSILLECTOMY      wrist release Bilateral 1965     History reviewed. No pertinent family history.  Social History     Tobacco Use    Smoking status: Never Smoker    Smokeless tobacco: Never Used   Substance Use Topics    Alcohol use: Yes     Comment: occasional    Drug use: Not on file       OBJECTIVE:     Vital Signs Range (Last 24H):  BP (!) 105/58 (BP Location: Right arm, Patient Position: Lying)   Pulse 63   Temp 96.5 °F (35.8 °C) (Oral)   Resp 16   Ht 5' 5" (1.651 m)   Wt 93.4 kg (205 lb 14.6 oz)   SpO2 96%   BMI 34.27 kg/m²     Temp:  [96.5 °F (35.8 °C)-97.9 °F (36.6 °C)]   Pulse:  [55-71]   Resp:  [12-16]   BP: ()/(51-98)   SpO2:  [94 %-100 %]     I & O (Last 24H):    Intake/Output Summary (Last 24 hours) at 5/27/2019 1603  Last data filed at 5/27/2019 1313  Gross " per 24 hour   Intake 2250 ml   Output 950 ml   Net 1300 ml       Physical Exam:  General appearance: Well developed, well nourished  Eyes:  Conjunctivae/corneas clear. EOMI  Lungs: Normal respiratory effort,   clear to auscultation bilaterally   Heart: Regular rate and rhythm, S1, S2 normal, no murmur, rub or ayo.  Abdomen: Soft, non-tender non-distended; bowel sounds normal; no masses,  no organomegaly  Extremities: No edema.  R hip with clean bandages.  Drain with blood output.  UE shortened and contracted.  Skin: Skin color, texture, turgor normal. No rashes or lesions  Neurologic: Normal strength and tone. No focal numbness or weakness     Laboratory Data:  No results for input(s): WBC, RBC, HGB, HCT, PLT, MCV, MCH, MCHC in the last 24 hours.    BMP: No results for input(s): GLU, NA, K, CL, CO2, BUN, CREATININE, CALCIUM, MG in the last 168 hours.    Invalid input(s):  PHOS  Lab Results   Component Value Date    CALCIUM 9.5 05/16/2019             Medications:  Medication list was reviewed and changes noted under Assessment/Plan.    Diagnostic Results: Reviewed.    ASSESSMENT/PLAN:     Severe OA, s/p R LEAH  - Post-op management, pain control, DVT prophylaxis per Dr. Covarrubias.    HTN  - On both ACEi and ARB by PCP and has been on this regimen for >20 years.  Will not change this during acute hospitalization but encouraged pt to discuss alternatives with PCP at next visit.  - Verapamil daily.    Arthrogryposis  - Congenital joint contractures.  - Uses walker at baseline.    Vitamin D deficiency  - D3 daily.    HLD  - Statin qhs.    Thank you for the consult.  We will continue to follow along with you.    Chun Murphy MD  Nephrology

## 2019-05-27 NOTE — PT/OT/SLP PROGRESS
Occupational Therapy  Not Seen    Junior Beatty   MRN: [id number]     Patient not seen for Occupational Therapy today due to ( ) departmental protocol for elective surgery patients.    Patient with Primary localized osteoarthrosis of right hip [M16.11], s/p Procedure(s):  ARTHROPLASTY, HIP 5/27/2019 who will be seen for Occupational Therapy evaluation POD#1.    Leighann Kearney, OT   5/27/2019

## 2019-05-27 NOTE — PLAN OF CARE
[facility]       [address]       [facility] [address]       [phone]               [facility] Orthopedic Discharge Orders    INPATIENT REHAB     Labs per Rehab unit        Expected Discharge Date: 5/28    Diagnoses:  Post-op  right hip(s) replacement    Patient is homebound due to:   Pt requires home health services due to taxing effort to leave the home as a result of immobility from Post-op right hip(s) replacement    Weight Bearing Status:   full weight bearing: right leg     Posterior Hip Precautions for 6 weeks, AVOID:  -Avoid greater than 90 degrees of flexion, internal rotation, and adduction  -Avoid extension, external rotation, and abduction  -Must sleep with hip abduction pillow or regular pillow b/t legs    Physical Therapy   5 times a week   - Ambulate with a rolling walker  - Progress to cane  -Instruct on ROM and strengthening of knee  -Set up for outpt once staples removed and MOD 1 with cane    Wound Care:   If patient is discharged with aqua cierra/silver dressing, leave on for 5 days unless saturated border to border, then follow instructions below:  Cleanse with wound cleanser or normal saline and apply island dressing.  If island dressing not available apply gauze and tegaderm.  Change surgical dressing 3 times a week and PRN  in standing position.  Teach patient to change daily if draining.  Pt may shower if surgical dressing is waterproof.. Removal and replacement of dressing after shower only needed if incision is suspected to have gotten wet during shower.  Otherwise change as previously described depending on dressing/drainage. No soaking in the tub or hot tub  for 6 weeks.    Wear  TEDS Bilateral Knee High Stockings for 3  Weeks. OK to remove stockings 1-2 hours/day max if desired.    PT/SN to remove staples 14 days Post-op and apply skin prep and steri-strips.    Cold therapy/Ice: encouraged at least 20 minutes 2-3 times daily or more if desired.  Incision  must be kept waterproof while icing.      Contact:  Please contact the nurse practitioner, Emmie at [phone] at Ext [phone]. with concerns.  She is in surgery M,W,F so if urgent and needs to be addressed prior to the end of the day call the  and they with contact her in the OR or Clinic.     BLOOD THINNER:    If sent home on Xarelto         -14 days post-op for TKR       -30 days post-op for THR     If sent home home on ASA    325mg   BID x 4 weeks     Once finished with prescribed blood thinner, patients can return to pre-surgical ASA dosage if they took ASA before surgery.     Outpatient Therapy: [facility]    [address] [address]   or  [address] [place] [address]    Call [phone] to schedule appointment  Fax [phone]    If need orders: Call Mai at Ext [phone]        DME:  - rolling Walker  - 3 in 1 commode  - tub bench / shower chair  - Hip Kit  - Per PT/OT recommendation  - Other:Marlon Covarrubias

## 2019-05-27 NOTE — ANESTHESIA POSTPROCEDURE EVALUATION
Anesthesia Post Evaluation    Patient: Junior Beatty    Procedure(s) Performed: Procedure(s) (LRB):  ARTHROPLASTY, HIP (Right)    Final Anesthesia Type: general  Patient location during evaluation: PACU  Patient participation: Yes- Able to Participate  Level of consciousness: awake and alert  Post-procedure vital signs: reviewed and stable  Pain management: adequate  Airway patency: patent  PONV status at discharge: No PONV  Anesthetic complications: no      Cardiovascular status: blood pressure returned to baseline  Respiratory status: unassisted and spontaneous ventilation  Hydration status: euvolemic  Follow-up not needed.          Vitals Value Taken Time   BP 94/51 5/27/2019  1:01 PM   Temp 36.6 °C (97.9 °F) 5/27/2019  8:33 AM   Pulse 55 5/27/2019  1:10 PM   Resp 16 5/27/2019  1:00 PM   SpO2 92 % 5/27/2019  1:10 PM   Vitals shown include unvalidated device data.      No case tracking events are documented in the log.      Pain/Jimmy Score: Pain Rating Prior to Med Admin: 6 (5/27/2019 12:36 PM)  Pain Rating Post Med Admin: 5 (5/27/2019 12:45 PM)  Jimmy Score: 10 (5/27/2019 12:45 PM)

## 2019-05-28 PROBLEM — M16.11 PRIMARY LOCALIZED OSTEOARTHRITIS OF RIGHT HIP: Status: RESOLVED | Noted: 2019-05-27 | Resolved: 2019-05-28

## 2019-05-28 LAB
ERYTHROCYTE [DISTWIDTH] IN BLOOD BY AUTOMATED COUNT: 13.8 % (ref 11.5–14.5)
HCT VFR BLD AUTO: 37.5 % (ref 40–54)
HGB BLD-MCNC: 12.8 G/DL (ref 14–18)
MCH RBC QN AUTO: 29 PG (ref 27–31)
MCHC RBC AUTO-ENTMCNC: 34.1 G/DL (ref 32–36)
MCV RBC AUTO: 85 FL (ref 82–98)
PLATELET # BLD AUTO: 167 K/UL (ref 150–350)
PMV BLD AUTO: 8.9 FL (ref 9.2–12.9)
RBC # BLD AUTO: 4.42 M/UL (ref 4.6–6.2)
WBC # BLD AUTO: 10.12 K/UL (ref 3.9–12.7)

## 2019-05-28 PROCEDURE — 97535 SELF CARE MNGMENT TRAINING: CPT

## 2019-05-28 PROCEDURE — 97116 GAIT TRAINING THERAPY: CPT

## 2019-05-28 PROCEDURE — 94799 UNLISTED PULMONARY SVC/PX: CPT

## 2019-05-28 PROCEDURE — 11000001 HC ACUTE MED/SURG PRIVATE ROOM

## 2019-05-28 PROCEDURE — 94761 N-INVAS EAR/PLS OXIMETRY MLT: CPT

## 2019-05-28 PROCEDURE — 99900035 HC TECH TIME PER 15 MIN (STAT)

## 2019-05-28 PROCEDURE — 97530 THERAPEUTIC ACTIVITIES: CPT

## 2019-05-28 PROCEDURE — 63600175 PHARM REV CODE 636 W HCPCS

## 2019-05-28 PROCEDURE — 85027 COMPLETE CBC AUTOMATED: CPT

## 2019-05-28 PROCEDURE — 97165 OT EVAL LOW COMPLEX 30 MIN: CPT

## 2019-05-28 PROCEDURE — 36415 COLL VENOUS BLD VENIPUNCTURE: CPT

## 2019-05-28 PROCEDURE — 25000003 PHARM REV CODE 250: Performed by: INTERNAL MEDICINE

## 2019-05-28 PROCEDURE — 25000003 PHARM REV CODE 250

## 2019-05-28 RX ORDER — BISACODYL 10 MG
10 SUPPOSITORY, RECTAL RECTAL DAILY PRN
Refills: 0 | COMMUNITY
Start: 2019-05-28 | End: 2019-06-27

## 2019-05-28 RX ORDER — ASPIRIN 325 MG
325 TABLET ORAL EVERY 12 HOURS
Refills: 0 | COMMUNITY
Start: 2019-05-28 | End: 2019-06-27

## 2019-05-28 RX ORDER — OXYCODONE AND ACETAMINOPHEN 5; 325 MG/1; MG/1
TABLET ORAL
Qty: 50 TABLET | Refills: 0
Start: 2019-05-28 | End: 2019-06-27

## 2019-05-28 RX ORDER — DOCUSATE SODIUM 100 MG/1
100 CAPSULE, LIQUID FILLED ORAL EVERY 12 HOURS
Refills: 0 | COMMUNITY
Start: 2019-05-28 | End: 2019-06-27

## 2019-05-28 RX ORDER — FAMOTIDINE 20 MG/1
20 TABLET, FILM COATED ORAL 2 TIMES DAILY
Qty: 60 TABLET | Refills: 11 | Status: SHIPPED | OUTPATIENT
Start: 2019-05-28 | End: 2019-06-27

## 2019-05-28 RX ORDER — ACETAMINOPHEN 325 MG/1
650 TABLET ORAL EVERY 6 HOURS PRN
Refills: 0 | COMMUNITY
Start: 2019-05-28 | End: 2021-01-14

## 2019-05-28 RX ADMIN — ASPIRIN 325 MG ORAL TABLET 325 MG: 325 PILL ORAL at 10:05

## 2019-05-28 RX ADMIN — ASPIRIN 325 MG ORAL TABLET 325 MG: 325 PILL ORAL at 08:05

## 2019-05-28 RX ADMIN — MUPIROCIN 1 G: 20 OINTMENT TOPICAL at 09:05

## 2019-05-28 RX ADMIN — CELECOXIB 200 MG: 200 CAPSULE ORAL at 10:05

## 2019-05-28 RX ADMIN — OXYCODONE HYDROCHLORIDE 5 MG: 5 TABLET ORAL at 10:05

## 2019-05-28 RX ADMIN — VITAMIN D, TAB 1000IU (100/BT) 1000 UNITS: 25 TAB at 10:05

## 2019-05-28 RX ADMIN — DOCUSATE SODIUM 100 MG: 100 CAPSULE, LIQUID FILLED ORAL at 09:05

## 2019-05-28 RX ADMIN — FAMOTIDINE 20 MG: 20 TABLET, FILM COATED ORAL at 10:05

## 2019-05-28 RX ADMIN — CEFAZOLIN SODIUM 2 G: 2 SOLUTION INTRAVENOUS at 02:05

## 2019-05-28 RX ADMIN — FAMOTIDINE 20 MG: 20 TABLET, FILM COATED ORAL at 09:05

## 2019-05-28 RX ADMIN — ACETAMINOPHEN 1000 MG: 500 TABLET ORAL at 06:05

## 2019-05-28 RX ADMIN — VERAPAMIL HYDROCHLORIDE 180 MG: 180 TABLET, FILM COATED, EXTENDED RELEASE ORAL at 10:05

## 2019-05-28 RX ADMIN — DOCUSATE SODIUM 100 MG: 100 CAPSULE, LIQUID FILLED ORAL at 10:05

## 2019-05-28 RX ADMIN — ATORVASTATIN CALCIUM 20 MG: 20 TABLET, FILM COATED ORAL at 10:05

## 2019-05-28 RX ADMIN — LOSARTAN POTASSIUM 100 MG: 50 TABLET, FILM COATED ORAL at 10:05

## 2019-05-28 RX ADMIN — DEXTROSE AND SODIUM CHLORIDE: 5; .9 INJECTION, SOLUTION INTRAVENOUS at 02:05

## 2019-05-28 RX ADMIN — CELECOXIB 200 MG: 200 CAPSULE ORAL at 09:05

## 2019-05-28 RX ADMIN — OXYCODONE HYDROCHLORIDE 5 MG: 5 TABLET ORAL at 01:05

## 2019-05-28 RX ADMIN — OXYCODONE HYDROCHLORIDE 5 MG: 5 TABLET ORAL at 02:05

## 2019-05-28 NOTE — PLAN OF CARE
Problem: Occupational Therapy Goal  Goal: Occupational Therapy Goal  Goals to be met by 6/28/19    CGA sit><stand with RW  MI UBD  SBA LBD  Assess toilet hygiene  CGA toilet transfer with RW  Outcome: Ongoing (interventions implemented as appropriate)  OT evaluation completed with treatment initiated.  Recommend In-Pt Rehabilitation at discharge. If discharged home he needs Home Health PT and OT.   DME:None.  HARRY Coles 5/28/2019

## 2019-05-28 NOTE — PLAN OF CARE
Problem: Adult Inpatient Plan of Care  Goal: Plan of Care Review  Outcome: Ongoing (interventions implemented as appropriate)  Patient remains free from injury or falls. Vital signs stable throughout night on room air. Positions self independently. Chavez draining to gravity. Pain managed with PO medications. Hemovac draining bloody drainage. Incision to hip cdi, ice pack applied.  Bed in low locked position and call light within reach. Neuro checks WNL. Purposeful rounding performed. Will continue to monitor.

## 2019-05-28 NOTE — PLAN OF CARE
Mayi from [facility] at bedside to eval patient for admit - patient has been accepted - awaiting response from insurance

## 2019-05-28 NOTE — PROGRESS NOTES
"Progress Note  Orthopedics    Admit Date: 5/27/2019   Patient ID: Junior Beatty is a 58 y.o. male.  Postop day 1.  Right total hip replacement. Patient sitting in chair.  Dressing dry, Hemovac drain removed. Neurovascularly intact.  Ambulated 70 ft.  Requesting rehab unit because of his arthrogryposis and multiple joint involvement.  Continue PT and OT.            Willian MELIS[facility]      Vital Sign (recent):  BP (!) 147/68   Pulse (!) 51   Temp 97.6 °F (36.4 °C)   Resp 18   Ht 5' 5" (1.651 m)   Wt 93.4 kg (205 lb 14.6 oz)   SpO2 97%   BMI 34.27 kg/m²       Laboratory:    CBC:   Recent Labs   Lab 05/28/19  0421   WBC 10.12   RBC 4.42*   HGB 12.8*   HCT 37.5*      MCV 85   MCH 29.0   MCHC 34.1       CMP: No results for input(s): GLU, CALCIUM, ALBUMIN, PROT, NA, K, CO2, CL, BUN, CREATININE, ALKPHOS, ALT, AST, BILITOT in the last 168 hours.        Intake/Output Summary (Last 24 hours) at 5/28/2019 0814  Last data filed at 5/28/2019 0700  Gross per 24 hour   Intake 2690 ml   Output 3635 ml   Net -945 ml         Current Medications:   acetaminophen  1,000 mg Oral Q8H    aspirin  325 mg Oral Q12H    atorvastatin  20 mg Oral Daily    celecoxib  200 mg Oral BID    docusate sodium  100 mg Oral Q12H    famotidine  20 mg Oral BID    losartan  100 mg Oral Daily    mupirocin  1 g Nasal BID    polyethylene glycol  17 g Oral Daily    verapamil  180 mg Oral Daily    vitamin D  1,000 Units Oral Daily       Continuous Infusions:   dextrose 5 % and 0.9 % NaCl 100 mL/hr at 05/28/19 0222     PRN Meds:.acetaminophen, bisacodyl, diphenhydrAMINE, HYDROmorphone, ondansetron, oxyCODONE, oxyCODONE, promethazine (PHENERGAN) IVPB, sodium chloride 0.9%, sodium chloride 0.9%  "

## 2019-05-28 NOTE — PROGRESS NOTES
"Nephrology  Progress Note    Admit Date: 5/27/2019   LOS: 1 day     SUBJECTIVE:     Follow-up For:  Primary localized osteoarthritis of right hip    Interval History:     Uneventful night.  Doing well and awaiting rehab placement.  Denies chest pain, shortness of breath, calf tenderness. Discussed with Ortho team at bedside.    Review of Systems:  Constitutional: No fever or chills  Respiratory: No cough or shortness of breath  Cardiovascular: No chest pain or palpitations  Gastrointestinal: No nausea or vomiting  Neurological: No confusion or weakness    OBJECTIVE:     Vital Signs Range (Last 24H):  BP (!) 147/68   Pulse (!) 51   Temp 97.6 °F (36.4 °C)   Resp 18   Ht 5' 5" (1.651 m)   Wt 93.4 kg (205 lb 14.6 oz)   SpO2 97%   BMI 34.27 kg/m²     Temp:  [96.5 °F (35.8 °C)-97.6 °F (36.4 °C)]   Pulse:  [51-72]   Resp:  [12-18]   BP: ()/(51-81)   SpO2:  [94 %-100 %]     I & O (Last 24H):    Intake/Output Summary (Last 24 hours) at 5/28/2019 0918  Last data filed at 5/28/2019 0700  Gross per 24 hour   Intake 2690 ml   Output 3635 ml   Net -945 ml       Physical Exam:  General appearance: Well developed, well nourished  Eyes:  Conjunctivae/corneas clear. PERRL.  Lungs: Normal respiratory effort,   clear to auscultation bilaterally   Heart: Regular rate and rhythm, S1, S2 normal, no murmur, rub or ayo.  Abdomen: Soft, non-tender non-distended; bowel sounds normal; no masses,  no organomegaly  Extremities: No cyanosis or clubbing. No edema.  UE shortened and contracted  Skin: Skin color, texture, turgor normal. No rashes or lesions  Neurologic: Normal strength and tone. No focal numbness or weakness   Right hip clear dry and intact    Laboratory Data:  Recent Labs   Lab 05/28/19  0421   WBC 10.12   RBC 4.42*   HGB 12.8*   HCT 37.5*      MCV 85   MCH 29.0   MCHC 34.1       BMP: No results for input(s): GLU, NA, K, CL, CO2, BUN, CREATININE, CALCIUM, MG in the last 168 hours.    Invalid input(s):  " PHOS  Lab Results   Component Value Date    CALCIUM 9.5 05/16/2019       Lab Results   Component Value Date    CALCIUM 9.5 05/16/2019       No results found for: URICACID    BNP  No results for input(s): BNP, BNPTRIAGEBLO in the last 168 hours.    Medications:  Medication list was reviewed and changes noted under Assessment/Plan.    Diagnostic Results:        ASSESSMENT/PLAN:     Severe OA, s/p R LEAH  - Post-op management, pain control, DVT prophylaxis per Dr. Covarrubias.     HTN  - On both ACEi and ARB by PCP and has been on this regimen for >20 years.  Will not change this during acute hospitalization but encouraged pt to discuss alternatives with PCP at next visit.  - Verapamil daily.    Arthrogryposis  - Congenital joint contractures.  - Uses walker at baseline.     Vitamin D deficiency  - D3 daily.     HLD  - Statin qhs.       Medically stable for rehab

## 2019-05-28 NOTE — PT/OT/SLP EVAL
Occupational Therapy   Evaluation/Treatment  Name: Junior Beatty  MRN: [id number]  Admitting Diagnosis:  Primary localized osteoarthritis of right hip 1 Day Post-Op  ARTHROPLASTY, HIP (Right) (Siobhan TM hip)  Recommendations:     Discharge Recommendations: rehabilitation facility  Discharge Equipment Recommendations:  none  Barriers to discharge:  Decreased caregiver support    Assessment:     Junior Beatty is a 58 y.o. male with a medical diagnosis of Primary localized osteoarthritis of right hip.  He presents with loss of functional mobility and self-care skill post-LEAH surgery complicated by limitations from  Arthrogryposis.  Performance deficits affecting function: impaired endurance, gait instability, impaired balance, impaired self care skills, decreased lower extremity function, pain, decreased safety awareness, impaired skin, decreased ROM, orthopedic precautions.  He was Mod/Min A sit><stand, SBA ambulation, and Min A toilet/chair transfers with RW. Self-care skill was MI self-feeding, Supervision Assist G/H, SBA UBD, and Min A LBD with AE.  OT treatment is needed to maximize patient function while in the hospital.    In-Pt rehabilitation is recommended for discharge.  Because of increased need for caregiver assist and additional functional adaptation needed for ADLs caused by the effect of Arthrogryposis, he needs the specialized services available only in an In-Pt Rehabilitation facility to optimize his potential to return to functional independence with good surgical outcome.     Rehab Prognosis: Good; patient would benefit from acute skilled OT services to address these deficits and reach maximum level of function.       Plan:     Patient to be seen daily to address the above listed problems via self-care/home management, therapeutic activities, therapeutic exercises  · Plan of Care Expires: 06/28/19  · Plan of Care Reviewed with: patient    Subjective     Chief Complaint: none  Patient/Family  Comments/goals: Independent with bed mobility    Occupational Profile:  Living Environment: lives alone in a single story house with 1 [address]ENCIA at entry  Previous level of function: ambulatory with SC, drives a car, MI ADLs and IADLs, cleaning lady manages much of the household tasks  Roles and Routines: works full-time as , enjoys reading, kayaking  Equipment Used at Home:  bedside commode, rollator, walker, rolling, cane, straight, shower chair, raised toilet, grab bar(walk-in shower with shower massage and grab bars / reaching stick, back scaratcher, toilet paper wand, shower brush, sock aide)  Assistance upon Discharge: he reports to have a  girlfriend, mother, cleaning lady available to assist by arrangement (the degree of assist is uncertain)    Pain/Comfort:  · Pain Rating 1: 6/10  · Location - Side 1: Right  · Location - Orientation 1: generalized  · Location 1: hip  · Pain Addressed 1: Pre-medicate for activity  · Pain Rating Post-Intervention 1: 4/10    Patients cultural, spiritual, Pentecostal conflicts given the current situation: no    Objective:     Communicated with: patient and his nurse prior to session.  Patient found up in chair with peripheral IV upon OT entry to room. He was agreeable to OT service    General Precautions: Standard, fall   Orthopedic Precautions:RLE weight bearing as tolerated, RLE posterior precautions   Braces: N/A     Occupational Performance:    Bed Mobility:    · None    Functional Mobility/Transfers:  · Mod/Min A sit><stand with RW  · Min A toilet transfer with RW  · Min A Chair transfer with RW    He ambulated chair>sofa to retrieve G/H supplies>bathroom>chair with RW SBA    Activities of Daily Living:  · MI self-feeding (drink coffee) seated in chair  · Supervision G/H (brush teeth and hair, dried hands and mouth using back scratcher to turn faucet on and off) standing at sink with RW   · Min A retrieval of G/H supplies and clothing at sofa with RW  · SBA UBD  (doff 2 hospital gowns, don shirt) seated on [facility]-toilet  · Min A LBD (don shorts, doff/don socks, discussed safety and technique to don shoes) seated and standing at [facility] [place] [facility]-toilet and seated in chair  · Demonstration-instruction for toilet use in standing with RW    Cognitive/Visual Perceptual:  Cognitive/Psychosocial Skills:     -       Oriented to: Person, Place, Time and Situation   -       Follows Commands/attention:Follows two-step commands  -       Communication: clear/fluent  -       Memory: No Deficits noted  -       Safety awareness/insight to disability: impaired   -       Mood/Affect/Coping skills/emotional control: Appropriate to situation  Visual/Perceptual:      -Impaired  acuity glasses    Physical Exam:  Postural examination/scapula alignment:    -       static erect trunk with short arms, hips ABD with limited ROM in arms and legs  Skin integrity: Visible skin intact  Edema:  Mild Right hip  Sensation:    -       Intact for light touch both hands  Motor Planning:    -       WNL  Dominant hand:    -       Right  UE ROM: WFL (shoulder flexion/ABD to 90* BUE  UE Strength: WFL BUE with adapted approach for limited functional reach  Hand Function: WFL both hands  Gross motor coordination:  good static sitting balance, impaired LE balance-gait, good endurance with activities performed    AMPAC 6 Click ADL:  AMPAC Total Score: 18    Treatment & Education:  UBD, hip precautions, hip precautions with standing ADLs, RW use and safety with hip precautions, LBD, UBD, home safety and set-up for self-care, problem solving with self-care for limited caregiver assist, transfer training, activity precautions and recommendations for home  Education:    Patient left up in chair with call button in reach and nurse notified    GOALS:   Multidisciplinary Problems     Occupational Therapy Goals        Problem: Occupational Therapy Goal    Goal Priority Disciplines Outcome Interventions   Occupational Therapy Goal     OT,  PT/OT Ongoing (interventions implemented as appropriate)    Description:  Goals to be met by 6/28/19    CGA sit><stand with RW  MI UBD  SBA LBD  Assess toilet hygiene  CGA toilet transfer with RW                    History:     Past Medical History:   Diagnosis Date    Arthritis     Arthrogryposis     Hypertension        Past Surgical History:   Procedure Laterality Date    ANKLE SURGERY Left 1964    Klever procedure    APPLICATION OF SPICA CAST  1962-63    COLONOSCOPY  2014    ELBOW SURGERY Bilateral 1965    FRACTURE SURGERY Left 1966    HEMORRHOID SURGERY  2009    JOINT REPLACEMENT Left 1993    w/reconstruction 2002    KNEE ARTHROSCOPY Right 1986    TONSILLECTOMY      wrist release Bilateral 1965       Time Tracking:     OT Date of Treatment: 05/28/19  OT Start Time: 0910  OT Stop Time: 1022  OT Total Time (min): 72 min    Billable Minutes:Evaluation 34  Self Care/Home Management 38    HARRY Coles  5/28/2019

## 2019-05-28 NOTE — PLAN OF CARE
Problem: Physical Therapy Goal  Goal: Physical Therapy Goal  Goals to be met by: 6/3/19     Patient will increase functional independence with mobility by performin. Supine to sit with supervision.   2. Sit to supine with supervision.   3. Sit<>stand transfer with supervision using rolling walker.   4. Gait > 150 feet with SBA using rolling walker.   5. Ascend/descend 9 stairs with right handrails with CGA with least restrictive AD.     Outcome: Ongoing (interventions implemented as appropriate)  Patient continues to progress towards goals. Requires Tiny for sit<>stand from low chair and CGA from high bed surface. Amb x75 ft with RW and x3 standing rest breaks, pt took pain medication during session with delay in pain control. Practiced bed mobility, least assistance required for pt with logrolling with pillow between legs to prevent hip adduction. Required Tiny for sit>supine and modA for supine>sit. Patient verbalizes understanding of logrolling technique towards left side with pillow to maintain posterior hip precautions and handout provided. Rec for d/c to IRF; if pt d/c to home will require increased assistance and HH PT/OT.

## 2019-05-28 NOTE — PT/OT/SLP PROGRESS
Physical Therapy Treatment    Patient Name:  Junior Beatty   MRN:  [id number]    Recommendations:     Discharge Recommendations:  (Pt prefers [facility])   Discharge Equipment Recommendations: (Has recommended equipment)   Barriers to discharge: Inaccessible home and Decreased caregiver support    Assessment:     Junior Beatty is a 58 y.o. male admitted with a medical diagnosis of Primary localized osteoarthritis of right hip.  He presents with the following impairments/functional limitations:  weakness, impaired functional mobilty, impaired endurance, gait instability, decreased ROM, orthopedic precautions, impaired joint extensibility, abnormal tone, impaired balance . Pt progressing toward goals. Pt performed sit<>stand with RW x2 with modA for lifting to obtain standing. Pt ambulated 100ft, 50ft with RW & CGA. VCs for heel toe & reciprocal gait. Pt ascended/descended 4 gym stairs x2. Min-modA for ascend- pt circumduct's L LE during ascend due to leg length discrepancy. 1st trail pt used B HR, 2nd trial pt used R hand rail by side stepping & descened backward down stairs for increased stability (pt reports this is how he did it at home & felt more comfortable with this way vs going down fwd like first trial).    Rehab Prognosis: Good; patient would benefit from acute skilled PT services to address these deficits and reach maximum level of function.    Recent Surgery: Procedure(s) (LRB):  ARTHROPLASTY, HIP (Right) 1 Day Post-Op    Plan:     During this hospitalization, patient to be seen BID to address the identified rehab impairments via gait training, therapeutic activities, therapeutic exercises and progress toward the following goals:    · Plan of Care Expires:  06/03/19    Subjective     Chief Complaint: difficulty standing from chair. Pt states he slept in recliner last night because it was more comfortable than bed.  Patient/Family Comments/goals: NA  Pain/Comfort:  · Pain Rating 1: 4/10  · Location - Side 1:  Right  · Location - Orientation 1: lateral  · Location 1: hip  · Pain Addressed 1: Pre-medicate for activity, Reposition, Distraction  · Pain Rating Post-Intervention 1: 4/10      Objective:     Communicated with nurse Martini prior to session.  Patient found up in chair with   upon PT entry to room.     General Precautions: Standard, fall   Orthopedic Precautions:RLE weight bearing as tolerated, RLE posterior precautions   Braces: N/A     Functional Mobility:  · Transfers- sit<>stand x2 with RW modA for lifting to stand.   · Gait- 100ft, 50ft RW & CGA. VCs for heel strike & reciprocal gait.  · Stairs- Pt ascended/descended 4 gym stairs using B HR & modA. Pt circumduct's L LE to clear step due to L LE leg length discrepancy. 2nd trail pt ascended stairs laterally using only R HR (to simulate home) & continued to circumduct L LE to clear step. modA for ascend, Tiny for descend.      AM-PAC 6 CLICK MOBILITY  Turning over in bed (including adjusting bedclothes, sheets and blankets)?: 3  Sitting down on and standing up from a chair with arms (e.g., wheelchair, bedside commode, etc.): 3  Moving from lying on back to sitting on the side of the bed?: 2  Moving to and from a bed to a chair (including a wheelchair)?: 3  Need to walk in hospital room?: 3  Climbing 3-5 steps with a railing?: 2  Basic Mobility Total Score: 16       Therapeutic Activities and Exercises:   Pt reports he's been doing LE exercises in bedside chair this AM.    Patient left up in chair with all lines intact, call button in reach and OT present..    GOALS:   Multidisciplinary Problems     Physical Therapy Goals        Problem: Physical Therapy Goal    Goal Priority Disciplines Outcome Goal Variances Interventions   Physical Therapy Goal     PT, PT/OT Ongoing (interventions implemented as appropriate)     Description:  Goals to be met by: 6/3/19     Patient will increase functional independence with mobility by performin. Supine to sit with  supervision.   2. Sit to supine with supervision.   3. Sit<>stand transfer with supervision using rolling walker.   4. Gait > 150 feet with SBA using rolling walker.   5. Ascend/descend 9 stairs with right handrails with CGA with least restrictive AD.                      Time Tracking:     PT Received On: 05/28/19  PT Start Time: 0835     PT Stop Time: 0913  PT Total Time (min): 38 min     Billable Minutes: Gait Training 38    Treatment Type: Treatment  PT/PTA: PTA     PTA Visit Number: 1     Kimberly Mcclelland PTA  05/28/2019

## 2019-05-28 NOTE — PT/OT/SLP PROGRESS
Physical Therapy Treatment    Patient Name:  Junior Beatty   MRN:  [id number]    Recommendations:     Discharge Recommendations:  TBD  Discharge Equipment Recommendations: (Has recommended equipment)   Barriers to discharge: Inaccessible home and Decreased caregiver support    Assessment:     Junior Beatty is a 58 y.o. male admitted with a medical diagnosis of Primary localized osteoarthritis of right hip.  He presents with the following impairments/functional limitations:  weakness, impaired self care skills, impaired functional mobilty, gait instability, impaired balance, decreased upper extremity function, decreased lower extremity function, pain, impaired skin, edema, orthopedic precautions.    Patient continues to be limited in independence with functional mobility, most notably sit<>stand, 2/2 UE limitations. Requires min-modA for sit<>stand with RW from low chair with armrests. Performed gait x70 ft with RW and CGA progressing to SBA. Bed mobility not observed this session as pt preference to sit in chair after conclusion of PT session.    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 There is expectation of returning to prior level of function to maintain independence avoiding readmission.      Pt is pleasant and motivated to return to prior level of function.     Rehab Prognosis: Good; patient would benefit from acute skilled PT services to address these deficits and reach maximum level of function.    Recent Surgery: Procedure(s) (LRB):  ARTHROPLASTY, HIP (Right) Day of Surgery    Plan:     During this hospitalization, patient to be seen BID to address the identified rehab impairments via gait training, therapeutic activities, therapeutic exercises and progress toward the  following goals:    · Plan of Care Expires:  06/03/19    Subjective     Chief Complaint: Continued pain in hip  Patient/Family Comments/goals: To do therapy and get better  Pain/Comfort:  · Pain Rating 1: 5/10  · Location - Side 1: Right  · Location - Orientation 1: lateral  · Location 1: hip  · Pain Addressed 1: Reposition, Distraction, Other (see comments)(ice applied)  · Pain Rating Post-Intervention 1: 5/10      Objective:     Communicated with DARRYL Jones prior to session.  Patient found up in chair with peripheral IV, hemovac, waite catheter, cryotherapy upon PT entry to room. Patient agreeable to PT treatment.      General Precautions: Standard, (fall risk)   Orthopedic Precautions:RLE weight bearing as tolerated, RLE posterior precautions   Braces: N/A     Patient donned yellow socks and gait belt for OOB mobility.    Functional Mobility:  · Transfers:     · Sit to Stand:  minimum assistance and moderate assistance with rolling walker  · Gait: x70 ft with RW and CGA progressing to SBA. Pt with effort to have equal stance time of LE with moderate improvement. Noted increased WB of UE on RW, with decreased WB on UE noted return of unequal stance times. Decreased stride length and gait speed with forward flexion of trunk at hips.       AM-PAC 6 CLICK MOBILITY  Turning over in bed (including adjusting bedclothes, sheets and blankets)?: 3  Sitting down on and standing up from a chair with arms (e.g., wheelchair, bedside commode, etc.): 3  Moving from lying on back to sitting on the side of the bed?: 2  Moving to and from a bed to a chair (including a wheelchair)?: 3  Need to walk in hospital room?: 3  Climbing 3-5 steps with a railing?: 2  Basic Mobility Total Score: 16       Therapeutic Activities and Exercises:   Supine ankle pumps, GS, QS    Sit<>stand from recliner with PT facilitating pt with independent problem solving, gait    Patient left up in chair with all lines intact, call button in reach, RN  Q'Vondra present and ice applied to lateral hip.    GOALS:   Multidisciplinary Problems     Physical Therapy Goals        Problem: Physical Therapy Goal    Goal Priority Disciplines Outcome Goal Variances Interventions   Physical Therapy Goal     PT, PT/OT Ongoing (interventions implemented as appropriate)     Description:  Goals to be met by: 6/3/19     Patient will increase functional independence with mobility by performin. Supine to sit with supervision.   2. Sit to supine with supervision.   3. Sit<>stand transfer with supervision using rolling walker.   4. Gait > 150 feet with SBA using rolling walker.   5. Ascend/descend 9 stairs with right handrails with CGA with least restrictive AD.                      Time Tracking:     PT Received On: 19  PT Start Time:      PT Stop Time:   PT Total Time (min): 30 min     Billable Minutes: Gait Training 18 and Therapeutic Activity 12    Treatment Type: Treatment  PT/PTA: PT     PTA Visit Number: 0     Lianet Freeman, PT  2019

## 2019-05-28 NOTE — PLAN OF CARE
Problem: Physical Therapy Goal  Goal: Physical Therapy Goal  Goals to be met by: 6/3/19     Patient will increase functional independence with mobility by performin. Supine to sit with supervision.   2. Sit to supine with supervision.   3. Sit<>stand transfer with supervision using rolling walker.   4. Gait > 150 feet with SBA using rolling walker.   5. Ascend/descend 9 stairs with right handrails with CGA with least restrictive AD.     Outcome: Ongoing (interventions implemented as appropriate)  Pt progressing toward goals. Pt performed sit<>stand with RW x2 with modA for lifting to obtain standing. Pt ambulated 100ft, 50ft with RW & CGA. VCs for heel toe & reciprocal gait. Pt ascended/descended 4 gym stairs x2. Min-modA for ascend- pt circumduct's L LE during ascend due to leg length discrepancy. 1st trail pt used B HR, 2nd trial pt used R hand rail by side stepping & descened backward down stairs for increased stability (pt reports this is how he did it at home & felt more comfortable with this way vs going down fwd like first trial).

## 2019-05-28 NOTE — PLAN OF CARE
Problem: Physical Therapy Goal  Goal: Physical Therapy Goal  Goals to be met by: 6/3/19     Patient will increase functional independence with mobility by performin. Supine to sit with supervision.   2. Sit to supine with supervision.   3. Sit<>stand transfer with supervision using rolling walker.   4. Gait > 150 feet with SBA using rolling walker.   5. Ascend/descend 9 stairs with right handrails with CGA with least restrictive AD.     Outcome: Ongoing (interventions implemented as appropriate)  Patient progressing towards meeting goals. Sit<>stand with min-modA with RW. Gait x70 ft with RW and CGA progressing to SBA. Bed mobility not observed this session as pt preference to sit in chair after conclusion of PT session.

## 2019-05-28 NOTE — PLAN OF CARE
Problem: Adult Inpatient Plan of Care  Goal: Plan of Care Review  Outcome: Ongoing (interventions implemented as appropriate)  Patient on RA sat's 98% with no distress IS done independently

## 2019-05-28 NOTE — PT/OT/SLP PROGRESS
Physical Therapy Treatment    Patient Name:  Junior Beatty   MRN:  [id number]    Recommendations:     Discharge Recommendations:  rehabilitation facility   Discharge Equipment Recommendations: (bed rail for bed)   Barriers to discharge: Inaccessible home and Decreased caregiver support    Assessment:     Junior Beatty is a 58 y.o. male admitted with a medical diagnosis of Primary localized osteoarthritis of right hip.  He presents with the following impairments/functional limitations:  impaired self care skills, impaired functional mobilty, gait instability, impaired balance, decreased lower extremity function, decreased upper extremity function, pain, decreased ROM, orthopedic precautions.    Patient continues to progress towards goals. Requires Tiny for sit<>stand from low chair and CGA from high bed surface. Amb x75 ft with RW and x3 standing rest breaks, pt took pain medication during session with delay in pain control. Practiced bed mobility, least assistance required for pt with logrolling with pillow between legs to prevent hip adduction. Required Tiny for sit>supine and modA for supine>sit. Patient verbalizes understanding of logrolling technique towards left side with pillow to maintain posterior hip precautions and handout provided.    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 There is expectation of returning to prior level of function to maintain independence avoiding readmission.     Pt's clinical condition meets full inpatient rehab criteria. Inpatient rehab will provide to total interdisciplinary treatment approach needed. Pt is at high risk of unplanned readmission due to fall risk, inability to  self-administer medication, and lack of 24 hour caregiver in prior setting.      Pt is able to tolerate 3 hours of daily therapy. Pt is pleasant and motivated to return to prior level of function.         Rehab Prognosis: Good; patient would benefit from acute skilled PT services to address these deficits and reach maximum level of function.    Recent Surgery: Procedure(s) (LRB):  ARTHROPLASTY, HIP (Right) 1 Day Post-Op    Plan:     During this hospitalization, patient to be seen BID to address the identified rehab impairments via gait training, therapeutic activities, therapeutic exercises and progress toward the following goals:    · Plan of Care Expires:  06/03/19    Subjective     Chief Complaint: Increased pain in hip, due for pain medication  Patient/Family Comments/goals: To go to rehab  Pain/Comfort:  · Pain Rating 1: 6/10  · Location - Side 1: Right  · Location - Orientation 1: generalized  · Location 1: hip  · Pain Addressed 1: Reposition, Distraction, Other (see comments), Nurse notified(ice applied)  · Pain Rating Post-Intervention 1: 7/10      Objective:     Communicated with RN Lianet prior to session.  Patient found up in chair with peripheral IV upon PT entry to room. Patient agreeable to PT treatment.    General Precautions: Standard, (fall risk)   Orthopedic Precautions:RLE weight bearing as tolerated, RLE posterior precautions   Braces: N/A     Patient donned yellow socks and gait belt for OOB mobility.    Functional Mobility:  · Bed Mobility:     · Trial of bed mobility performed with scooting into long sitting to supine vs logrolling. Pt unable to reach bed surface with BUE d/t decreased ROM and arthropometrics to perform scooting to long sitting. Demo'd and provided handout for logrolling towards left with pillow between legs to maintain adherence to hip precautions, patient performed with assistance as below.  · Supine to Sit: moderate assistance, pillow between legs, use of bed rail, and  assistance at trunk  · Sit to Supine: minimum assistance, pillow between legs, use of bed rail, and assistance at LE   · Transfers:     · Sit to Stand:  contact guard assistance and minimum assistance with rolling walker  · Decreased assistance from elevated surfaces  · Gait: x75 ft with RW and CGA-SBA.   · Pt required 3x standing rest breaks during gait bout  · Improved on gait deviations of decreased stride lengths, decreased stance time of RLE, and decreased knee/hip flexion of BLE. Pt reports decreased WB on BUE during gait  · Noted continued forward flexion of trunk, improved with verbal and tactile cues.  · Pt with elevated R hip 2/2 preexisiting leg length discrepancy      AM-PAC 6 CLICK MOBILITY  Turning over in bed (including adjusting bedclothes, sheets and blankets)?: 3  Sitting down on and standing up from a chair with arms (e.g., wheelchair, bedside commode, etc.): 3  Moving from lying on back to sitting on the side of the bed?: 2  Moving to and from a bed to a chair (including a wheelchair)?: 3  Need to walk in hospital room?: 3  Climbing 3-5 steps with a railing?: 2  Basic Mobility Total Score: 16       Therapeutic Activities and Exercises:  · Trial of bed mobility  · Gait  · Sit<>stand from variety of surfaces    Patient left up in chair with all lines intact, call button in reach, RN Lianet notified and ice applied to R hip over layers of pants..    GOALS:   Multidisciplinary Problems     Physical Therapy Goals        Problem: Physical Therapy Goal    Goal Priority Disciplines Outcome Goal Variances Interventions   Physical Therapy Goal     PT, PT/OT Ongoing (interventions implemented as appropriate)     Description:  Goals to be met by: 6/3/19     Patient will increase functional independence with mobility by performin. Supine to sit with supervision.   2. Sit to supine with supervision.   3. Sit<>stand transfer with supervision using rolling walker.   4. Gait > 150 feet with SBA using rolling  walker.   5. Ascend/descend 9 stairs with right handrails with CGA with least restrictive AD.                      Time Tracking:     PT Received On: 05/28/19  PT Start Time: 1309     PT Stop Time: 1355  PT Total Time (min): 46 min     Billable Minutes: Gait Training 15 and Therapeutic Activity 31    Treatment Type: Treatment  PT/PTA: PT     PTA Visit Number: 0     Lianet Freeman, PT  05/28/2019

## 2019-05-29 VITALS
HEART RATE: 61 BPM | OXYGEN SATURATION: 97 % | RESPIRATION RATE: 18 BRPM | HEIGHT: 65 IN | BODY MASS INDEX: 34.31 KG/M2 | TEMPERATURE: 97 F | WEIGHT: 205.94 LBS | DIASTOLIC BLOOD PRESSURE: 67 MMHG | SYSTOLIC BLOOD PRESSURE: 135 MMHG

## 2019-05-29 LAB
ERYTHROCYTE [DISTWIDTH] IN BLOOD BY AUTOMATED COUNT: 14.2 % (ref 11.5–14.5)
HCT VFR BLD AUTO: 39 % (ref 40–54)
HGB BLD-MCNC: 13.1 G/DL (ref 14–18)
MCH RBC QN AUTO: 29.2 PG (ref 27–31)
MCHC RBC AUTO-ENTMCNC: 33.6 G/DL (ref 32–36)
MCV RBC AUTO: 87 FL (ref 82–98)
PLATELET # BLD AUTO: 165 K/UL (ref 150–350)
PMV BLD AUTO: 9 FL (ref 9.2–12.9)
RBC # BLD AUTO: 4.49 M/UL (ref 4.6–6.2)
WBC # BLD AUTO: 7.64 K/UL (ref 3.9–12.7)

## 2019-05-29 PROCEDURE — 36415 COLL VENOUS BLD VENIPUNCTURE: CPT

## 2019-05-29 PROCEDURE — 25000003 PHARM REV CODE 250: Performed by: INTERNAL MEDICINE

## 2019-05-29 PROCEDURE — 97116 GAIT TRAINING THERAPY: CPT

## 2019-05-29 PROCEDURE — 85027 COMPLETE CBC AUTOMATED: CPT

## 2019-05-29 PROCEDURE — 25000003 PHARM REV CODE 250

## 2019-05-29 RX ADMIN — ASPIRIN 325 MG ORAL TABLET 325 MG: 325 PILL ORAL at 09:05

## 2019-05-29 RX ADMIN — FAMOTIDINE 20 MG: 20 TABLET, FILM COATED ORAL at 09:05

## 2019-05-29 RX ADMIN — OXYCODONE HYDROCHLORIDE 5 MG: 5 TABLET ORAL at 06:05

## 2019-05-29 RX ADMIN — DOCUSATE SODIUM 100 MG: 100 CAPSULE, LIQUID FILLED ORAL at 09:05

## 2019-05-29 RX ADMIN — VITAMIN D, TAB 1000IU (100/BT) 1000 UNITS: 25 TAB at 09:05

## 2019-05-29 RX ADMIN — VERAPAMIL HYDROCHLORIDE 180 MG: 180 TABLET, FILM COATED, EXTENDED RELEASE ORAL at 09:05

## 2019-05-29 RX ADMIN — ATORVASTATIN CALCIUM 20 MG: 20 TABLET, FILM COATED ORAL at 09:05

## 2019-05-29 RX ADMIN — OXYCODONE HYDROCHLORIDE 5 MG: 5 TABLET ORAL at 10:05

## 2019-05-29 RX ADMIN — CELECOXIB 200 MG: 200 CAPSULE ORAL at 09:05

## 2019-05-29 RX ADMIN — LOSARTAN POTASSIUM 100 MG: 50 TABLET, FILM COATED ORAL at 09:05

## 2019-05-29 RX ADMIN — MUPIROCIN 1 G: 20 OINTMENT TOPICAL at 10:05

## 2019-05-29 RX ADMIN — POLYETHYLENE GLYCOL 3350 17 G: 17 POWDER, FOR SOLUTION ORAL at 09:05

## 2019-05-29 NOTE — PLAN OF CARE
Problem: Adult Inpatient Plan of Care  Goal: Plan of Care Review  Outcome: Ongoing (interventions implemented as appropriate)  Pt remains free from injury or falls. Vital signs stable throughout night on room air. Positions self independently and up with stand by assist with walker.  Pain managed with PO medications, no complaints of nausea. Bed in low locked position and call light within reach.  Will continue to monitor.

## 2019-05-29 NOTE — PLAN OF CARE
Problem: Adult Inpatient Plan of Care  Goal: Plan of Care Review  Outcome: Ongoing (interventions implemented as appropriate)  Pt on room air, SpO2 97%. IS done.

## 2019-05-29 NOTE — PT/OT/SLP PROGRESS
Physical Therapy Treatment    Patient Name:  Junior Beatty   MRN:  [id number]    Recommendations:     Discharge Recommendations:  rehabilitation facility   Discharge Equipment Recommendations: (bed rail for bed)   Barriers to discharge: Inaccessible home and Decreased caregiver support    Assessment:     Junior Beatty is a 58 y.o. male admitted with a medical diagnosis of Primary localized osteoarthritis of right hip.  He presents with the following impairments/functional limitations:  impaired functional mobilty, impaired self care skills, decreased upper extremity function, decreased lower extremity function, decreased ROM, orthopedic precautions, pain, gait instability, impaired endurance, impaired joint extensibility . Pt standing in restroom speaking with nurse Hughes upon arrival. Pt agreeable to gait, but requested to hold off on other interventions until PM session due to reports of significant pain in R LE (may be from compensating for sx on L LE). Pt ambulated 40ft & requested to sit- bedside chair brought behind pt to sit. Pt left seated in bedside chair with LE elevated & ice packs applied to R hip & knee.    Rehab Prognosis: Good; patient would benefit from acute skilled PT services to address these deficits and reach maximum level of function.    Recent Surgery: Procedure(s) (LRB):  ARTHROPLASTY, HIP (Right) 2 Days Post-Op    Plan:     During this hospitalization, patient to be seen BID to address the identified rehab impairments via gait training, therapeutic activities, therapeutic exercises and progress toward the following goals:    · Plan of Care Expires:  06/03/19    Subjective     Chief Complaint: R hip pain  Patient/Family Comments/goals: improve gait  Pain/Comfort:  · Pain Rating 1: 7/10  · Location - Side 1: Right  · Location - Orientation 1: generalized  · Location 1: hip  · Pain Addressed 1: Reposition, Distraction, Cessation of Activity, Nurse notified  · Pain Rating Post-Intervention  1: 7/10      Objective:     Communicated with nurse Carl prior to session.  Patient found standing in bathroom with RW with peripheral IV upon PT entry to room.     General Precautions: Standard, fall   Orthopedic Precautions:RLE weight bearing as tolerated, RLE posterior precautions   Braces: N/A     Functional Mobility:  · Transfers- stand to sit with RW, Tiny  · Gait- 40ft RW. VCs for posture & heel toe gait. Pt reports pain is limiting gait this session.      AM-PAC 6 CLICK MOBILITY  Turning over in bed (including adjusting bedclothes, sheets and blankets)?: 3  Sitting down on and standing up from a chair with arms (e.g., wheelchair, bedside commode, etc.): 3  Moving from lying on back to sitting on the side of the bed?: 2  Moving to and from a bed to a chair (including a wheelchair)?: 3  Need to walk in hospital room?: 3  Climbing 3-5 steps with a railing?: 2  Basic Mobility Total Score: 16       Therapeutic Activities and Exercises:   Pt declined secondary to reports of R hip & knee pain.    Patient left up in chair with all lines intact, call button in reach and ice packs applied to R hip & knee..    GOALS:   Multidisciplinary Problems     Physical Therapy Goals        Problem: Physical Therapy Goal    Goal Priority Disciplines Outcome Goal Variances Interventions   Physical Therapy Goal     PT, PT/OT Ongoing (interventions implemented as appropriate)     Description:  Goals to be met by: 6/3/19     Patient will increase functional independence with mobility by performin. Supine to sit with supervision.   2. Sit to supine with supervision.   3. Sit<>stand transfer with supervision using rolling walker.   4. Gait > 150 feet with SBA using rolling walker.   5. Ascend/descend 9 stairs with right handrails with CGA with least restrictive AD.                      Time Tracking:     PT Received On: 19  PT Start Time: 930     PT Stop Time: 948  PT Total Time (min): 18 min     Billable Minutes: Gait  Training 18    Treatment Type: Treatment  PT/PTA: PTA     PTA Visit Number: 1     Kimberly Mcclelland, FLAVIA  05/29/2019

## 2019-05-29 NOTE — PLAN OF CARE
Karla from [facility] [employer] left voicemail for Nan (patient's CM at [employer]) regarding pending IRF auth - awaiting response

## 2019-05-29 NOTE — PT/OT/SLP PROGRESS
Occupational Therapy  Not Seen Note    Patient Name:  Junior Beatty   MRN:  [id number]    OT treatment attempted.  Patient not seen today secondary to Pain, Patient fatigue. No follow-up planned.  HARRY Coles  5/29/2019

## 2019-05-29 NOTE — PROGRESS NOTES
"Nephrology  Progress Note    Admit Date: 5/27/2019   LOS: 2 days     SUBJECTIVE:     Follow-up For:  Primary localized osteoarthritis of right hip    Interval History:     Uneventful night.  Awaiting rehab placement.  Issues with getting in/out of bed due to upper extremity weakness/handicapped.  Needs continued therapy.  No CP/SOB/Calf pain.     Review of Systems:  Constitutional: No fever or chills  Respiratory: No cough or shortness of breath  Cardiovascular: No chest pain or palpitations  Gastrointestinal: No nausea or vomiting  Neurological: No confusion or weakness    OBJECTIVE:     Vital Signs Range (Last 24H):  BP (!) 153/81 (BP Location: Right arm, Patient Position: Lying)   Pulse 61   Temp 97.6 °F (36.4 °C) (Oral)   Resp 18   Ht 5' 5" (1.651 m)   Wt 93.4 kg (205 lb 14.6 oz)   SpO2 97%   BMI 34.27 kg/m²     Temp:  [97.4 °F (36.3 °C)-98 °F (36.7 °C)]   Pulse:  [52-61]   Resp:  [17-20]   BP: (135-167)/(62-84)   SpO2:  [93 %-97 %]     I & O (Last 24H):    Intake/Output Summary (Last 24 hours) at 5/29/2019 1008  Last data filed at 5/29/2019 1000  Gross per 24 hour   Intake 840 ml   Output --   Net 840 ml       Physical Exam:  General appearance: Well developed, well nourished  Eyes:  Conjunctivae/corneas clear. PERRL.  Lungs: Normal respiratory effort,   clear to auscultation bilaterally   Heart: Regular rate and rhythm, S1, S2 normal, no murmur, rub or ayo.  Abdomen: Soft, non-tender non-distended; bowel sounds normal; no masses,  no organomegaly  Extremities: No cyanosis or clubbing. No edema.  UE shortened and contracted  Skin: Skin color, texture, turgor normal. No rashes or lesions  Neurologic: Normal strength and tone. No focal numbness or weakness   Right hip clear dry and intact    Laboratory Data:  Recent Labs   Lab 05/29/19  0532   WBC 7.64   RBC 4.49*   HGB 13.1*   HCT 39.0*      MCV 87   MCH 29.2   MCHC 33.6       BMP: No results for input(s): GLU, NA, K, CL, CO2, BUN, CREATININE, " CALCIUM, MG in the last 168 hours.    Invalid input(s):  PHOS  Lab Results   Component Value Date    CALCIUM 9.5 05/16/2019       Lab Results   Component Value Date    CALCIUM 9.5 05/16/2019       No results found for: URICACID    BNP  No results for input(s): BNP, BNPTRIAGEBLO in the last 168 hours.    Medications:  Medication list was reviewed and changes noted under Assessment/Plan.    Diagnostic Results:        ASSESSMENT/PLAN:     Severe OA, s/p R LEAH  - Post-op management, pain control, DVT prophylaxis per Dr. Covarrubias.     HTN  - On both ACEi and ARB by PCP and has been on this regimen for >20 years.  Will not change this during acute hospitalization but encouraged pt to discuss alternatives with PCP at next visit.  - Verapamil daily.    Arthrogryposis  - Congenital joint contractures.  - Uses walker at baseline.     Vitamin D deficiency  - D3 daily.     HLD  - Statin qhs.       Medically stable for rehab

## 2019-05-29 NOTE — PLAN OF CARE
Karla from [facility] reports they have received auth & are ready to receive patient - report can be called to [phone] - ADT-30 completed for transport - requested  time of 1:30pm - patient aware - RN Kierra updated      05/29/19 1219   Final Note   Assessment Type Final Discharge Note   Anticipated Discharge Disposition Rehab   What phone number can be called within the next 1-3 days to see how you are doing after discharge? [phone]   Discharge plans and expectations educations in teach back method with documentation complete? Yes   Right Care Referral Info   Post Acute Recommendation IRF   Facility Name [facility]

## 2019-05-29 NOTE — PT/OT/SLP DISCHARGE
Physical Therapy Discharge Summary    Name: Junior Beatty  MRN: [id number]   Principal Problem: Primary localized osteoarthritis of right hip     Patient Discharged from acute Physical Therapy on 19.  Please refer to prior PT noted date on 19 for functional status.     Assessment:     Patient has not met goals.    Objective:     GOALS:   Multidisciplinary Problems     Physical Therapy Goals        Problem: Physical Therapy Goal    Goal Priority Disciplines Outcome Goal Variances Interventions   Physical Therapy Goal     PT, PT/OT Ongoing (interventions implemented as appropriate)     Description:  Goals to be met by: 6/3/19     Patient will increase functional independence with mobility by performin. Supine to sit with supervision.   2. Sit to supine with supervision.   3. Sit<>stand transfer with supervision using rolling walker.   4. Gait > 150 feet with SBA using rolling walker.   5. Ascend/descend 9 stairs with right handrails with CGA with least restrictive AD.                      Reasons for Discontinuation of Therapy Services  Transfer to alternate level of care.      Plan:     Patient Discharged to: Inpatient Rehab.  PT of record not available for documentation.      Rosa Pantoja, PT  2019

## 2019-05-29 NOTE — PROGRESS NOTES
Report called to Carlos at [facility]. All questions and concerns addressed.     IV removed, patient tolerated well.     Awaiting transportation.

## 2019-05-29 NOTE — PT/OT/SLP DISCHARGE
Occupational Therapy Discharge Summary    Junior Beatty  MRN: [id number]   Principal Problem: Primary localized osteoarthritis of right hip      Patient Discharged from acute Occupational Therapy on 52919.  Please refer to prior OT note dated 52819 for functional status.    Assessment:      Patient has not met goals. evaluation only    Objective:     GOALS:   Multidisciplinary Problems     Occupational Therapy Goals        Problem: Occupational Therapy Goal    Goal Priority Disciplines Outcome Interventions   Occupational Therapy Goal     OT, PT/OT Ongoing (interventions implemented as appropriate)    Description:  Goals to be met by 6/28/19    CGA sit><stand with RW  MI UBD  SBA LBD  Assess toilet hygiene  CGA toilet transfer with RW                    Reasons for Discontinuation of Therapy Services  Transfer to alternate level of care.      Plan:     Patient Discharged to: Inpatient Rehab    HARRY Coles  5/29/2019

## 2019-06-05 NOTE — DISCHARGE SUMMARY
[facility]  Short Stay  Discharge Summary  TOTAL HIP REPLACEMENT    Admit Date: 5/27/2019    Discharge Date and Time: 5/29/2019  1:25 PM      Discharge Attending Physician: Willian Covarrubias MD     Hospital Course:  Junior Beatty,is a 58 y.o. male with severe osteoarthritis,   R hip, unrelieved with the conservative measures. The patient was admitted on 5/27/2019 underwent R total hip replacement.  Postoperatively, the patient did well and was weightbearing as tolerated with a walker. Junior Beatty was instructed on hip precautions.  The patient was discharged on 5/29/2019 with home health physical therapy and nursing. The patient will be on Percocet for pain and aspirin 325 mg p.o. b.i.d. with meals x4 weeks. I will see them back in the office in 4 weeks for followup.      Final Diagnoses:    Principal Problem: Primary localized osteoarthritis of right hip   Secondary Diagnoses:   Active Hospital Problems   No active problems to display.      Resolved Hospital Problems    Diagnosis Date Resolved POA    *Primary localized osteoarthritis of right hip [M16.11] 05/28/2019 Yes    Primary localized osteoarthritis of right hip [M16.11] 05/27/2019 Yes       Discharged Condition: fair    Disposition: Rehab Facility    Follow up/Patient Instructions:    Medications:  Reconciled Home Medications:      Medication List      START taking these medications    acetaminophen 325 MG tablet  Commonly known as:  TYLENOL  Take 2 tablets (650 mg total) by mouth every 6 (six) hours as needed.     aspirin 325 MG tablet  Take 1 tablet (325 mg total) by mouth every 12 (twelve) hours.     bisacodyl 10 mg Supp  Commonly known as:  DULCOLAX  Place 1 suppository (10 mg total) rectally daily as needed (Until bowel movement if patient has no bowel movement for 2 days).     docusate sodium 100 MG capsule  Commonly known as:  COLACE  Take 1 capsule (100 mg total) by mouth every 12 (twelve) hours.     famotidine 20 MG tablet  Commonly  known as:  PEPCID  Take 1 tablet (20 mg total) by mouth 2 (two) times daily.     oxyCODONE-acetaminophen 5-325 mg per tablet  Commonly known as:  PERCOCET  1 tab every 4 hours PRN pain or 2 tablets every 6 hours PRN pain        CONTINUE taking these medications    atorvastatin 20 MG tablet  Commonly known as:  LIPITOR  Take 20 mg by mouth once daily.     losartan 100 MG tablet  Commonly known as:  COZAAR  Take 100 mg by mouth once daily.     trandolapril 2 MG Tab  Commonly known as:  MAVIK  Take 1 mg by mouth once daily.     verapamil 180 MG C24p  Commonly known as:  VERELAN  Take 180 mg by mouth once daily.     vitamin D 1000 units Tab  Commonly known as:  VITAMIN D3  Take by mouth once daily. 25 mcg        STOP taking these medications    b complex vitamins capsule     fish oil-omega-3 fatty acids 300-1,000 mg capsule     flaxseed oil 1,000 mg Cap     garlic 1,000 mg Cap     HYDROcodone-acetaminophen 5-325 mg per tablet  Commonly known as:  NORCO     ibuprofen 600 MG tablet  Commonly known as:  ADVIL,MOTRIN     meloxicam 15 MG tablet  Commonly known as:  MOBIC     vitamin A 95235 UNIT capsule     VITAMIN C 1000 MG tablet  Generic drug:  ascorbic acid (vitamin C)     vitamin E 400 UNIT capsule          No discharge procedures on file.  Follow-up Information     Willian Covarrubias MD In 4 weeks.    Specialty:  Orthopedic Surgery  Contact information:  [address]  [facility]  [facility] [address] [phone]

## 2019-06-07 ENCOUNTER — HOSPITAL ENCOUNTER (INPATIENT)
Facility: HOSPITAL | Age: 59
LOS: 4 days | Discharge: NURSING, REHAB OR LONG-TERM CARE | DRG: 309 | End: 2019-06-11
Attending: EMERGENCY MEDICINE | Admitting: HOSPITALIST
Payer: COMMERCIAL

## 2019-06-07 DIAGNOSIS — I48.91 ATRIAL FIBRILLATION WITH RVR: ICD-10-CM

## 2019-06-07 DIAGNOSIS — R00.0 TACHYCARDIA: ICD-10-CM

## 2019-06-07 DIAGNOSIS — Z74.09 IMPAIRED MOBILITY AND ADLS: ICD-10-CM

## 2019-06-07 DIAGNOSIS — I95.9 HYPOTENSION, UNSPECIFIED HYPOTENSION TYPE: Primary | ICD-10-CM

## 2019-06-07 DIAGNOSIS — I48.91 ATRIAL FIBRILLATION: ICD-10-CM

## 2019-06-07 DIAGNOSIS — Z78.9 IMPAIRED MOBILITY AND ADLS: ICD-10-CM

## 2019-06-07 LAB
ALBUMIN SERPL BCP-MCNC: 3.3 G/DL (ref 3.5–5.2)
ALP SERPL-CCNC: 143 U/L (ref 55–135)
ALT SERPL W/O P-5'-P-CCNC: 33 U/L (ref 10–44)
AMORPH CRY UR QL COMP ASSIST: ABNORMAL
ANION GAP SERPL CALC-SCNC: 13 MMOL/L (ref 8–16)
AST SERPL-CCNC: 30 U/L (ref 10–40)
BACTERIA #/AREA URNS AUTO: ABNORMAL /HPF
BASOPHILS # BLD AUTO: 0.03 K/UL (ref 0–0.2)
BASOPHILS NFR BLD: 0.3 % (ref 0–1.9)
BILIRUB SERPL-MCNC: 0.4 MG/DL (ref 0.1–1)
BILIRUB UR QL STRIP: ABNORMAL
BNP SERPL-MCNC: 23 PG/ML (ref 0–99)
BUN SERPL-MCNC: 19 MG/DL (ref 6–20)
CALCIUM SERPL-MCNC: 9.1 MG/DL (ref 8.7–10.5)
CHLORIDE SERPL-SCNC: 107 MMOL/L (ref 95–110)
CLARITY UR REFRACT.AUTO: ABNORMAL
CO2 SERPL-SCNC: 18 MMOL/L (ref 23–29)
COLOR UR AUTO: ABNORMAL
CREAT SERPL-MCNC: 0.8 MG/DL (ref 0.5–1.4)
D DIMER PPP IA.FEU-MCNC: 6.93 MG/L FEU
DIFFERENTIAL METHOD: ABNORMAL
EOSINOPHIL # BLD AUTO: 0.1 K/UL (ref 0–0.5)
EOSINOPHIL NFR BLD: 1.5 % (ref 0–8)
ERYTHROCYTE [DISTWIDTH] IN BLOOD BY AUTOMATED COUNT: 14.1 % (ref 11.5–14.5)
EST. GFR  (AFRICAN AMERICAN): >60 ML/MIN/1.73 M^2
EST. GFR  (NON AFRICAN AMERICAN): >60 ML/MIN/1.73 M^2
GLUCOSE SERPL-MCNC: 139 MG/DL (ref 70–110)
GLUCOSE UR QL STRIP: NEGATIVE
HCT VFR BLD AUTO: 40 % (ref 40–54)
HGB BLD-MCNC: 13 G/DL (ref 14–18)
HGB UR QL STRIP: NEGATIVE
HYALINE CASTS UR QL AUTO: 219 /LPF
IMM GRANULOCYTES # BLD AUTO: 0.05 K/UL (ref 0–0.04)
IMM GRANULOCYTES NFR BLD AUTO: 0.5 % (ref 0–0.5)
INR PPP: 0.9 (ref 0.8–1.2)
KETONES UR QL STRIP: ABNORMAL
LACTATE SERPL-SCNC: 2.2 MMOL/L (ref 0.5–2.2)
LEUKOCYTE ESTERASE UR QL STRIP: NEGATIVE
LYMPHOCYTES # BLD AUTO: 1.6 K/UL (ref 1–4.8)
LYMPHOCYTES NFR BLD: 17.5 % (ref 18–48)
MAGNESIUM SERPL-MCNC: 2.1 MG/DL (ref 1.6–2.6)
MCH RBC QN AUTO: 28.8 PG (ref 27–31)
MCHC RBC AUTO-ENTMCNC: 32.5 G/DL (ref 32–36)
MCV RBC AUTO: 89 FL (ref 82–98)
MICROSCOPIC COMMENT: ABNORMAL
MONOCYTES # BLD AUTO: 0.8 K/UL (ref 0.3–1)
MONOCYTES NFR BLD: 8.3 % (ref 4–15)
NEUTROPHILS # BLD AUTO: 6.7 K/UL (ref 1.8–7.7)
NEUTROPHILS NFR BLD: 71.9 % (ref 38–73)
NITRITE UR QL STRIP: NEGATIVE
NRBC BLD-RTO: 0 /100 WBC
PH UR STRIP: 5 [PH] (ref 5–8)
PLATELET # BLD AUTO: 278 K/UL (ref 150–350)
PMV BLD AUTO: 8.8 FL (ref 9.2–12.9)
POTASSIUM SERPL-SCNC: 3.8 MMOL/L (ref 3.5–5.1)
PROT SERPL-MCNC: 6.5 G/DL (ref 6–8.4)
PROT UR QL STRIP: ABNORMAL
PROTHROMBIN TIME: 10 SEC (ref 9–12.5)
RBC # BLD AUTO: 4.52 M/UL (ref 4.6–6.2)
RBC #/AREA URNS AUTO: 3 /HPF (ref 0–4)
SODIUM SERPL-SCNC: 138 MMOL/L (ref 136–145)
SP GR UR STRIP: 1.03 (ref 1–1.03)
TROPONIN I SERPL DL<=0.01 NG/ML-MCNC: 0.01 NG/ML (ref 0–0.03)
TROPONIN I SERPL DL<=0.01 NG/ML-MCNC: 0.12 NG/ML (ref 0–0.03)
TSH SERPL DL<=0.005 MIU/L-ACNC: 1.26 UIU/ML (ref 0.4–4)
URN SPEC COLLECT METH UR: ABNORMAL
WBC # BLD AUTO: 9.3 K/UL (ref 3.9–12.7)
WBC #/AREA URNS AUTO: 3 /HPF (ref 0–5)

## 2019-06-07 PROCEDURE — 63600175 PHARM REV CODE 636 W HCPCS: Performed by: HOSPITALIST

## 2019-06-07 PROCEDURE — 93005 ELECTROCARDIOGRAM TRACING: CPT

## 2019-06-07 PROCEDURE — 20600001 HC STEP DOWN PRIVATE ROOM

## 2019-06-07 PROCEDURE — 36415 COLL VENOUS BLD VENIPUNCTURE: CPT

## 2019-06-07 PROCEDURE — 99223 1ST HOSP IP/OBS HIGH 75: CPT | Mod: ,,, | Performed by: HOSPITALIST

## 2019-06-07 PROCEDURE — 84484 ASSAY OF TROPONIN QUANT: CPT

## 2019-06-07 PROCEDURE — 84443 ASSAY THYROID STIM HORMONE: CPT

## 2019-06-07 PROCEDURE — 81001 URINALYSIS AUTO W/SCOPE: CPT

## 2019-06-07 PROCEDURE — 83036 HEMOGLOBIN GLYCOSYLATED A1C: CPT

## 2019-06-07 PROCEDURE — 99285 EMERGENCY DEPT VISIT HI MDM: CPT | Mod: 25

## 2019-06-07 PROCEDURE — 99285 EMERGENCY DEPT VISIT HI MDM: CPT | Mod: ,,, | Performed by: EMERGENCY MEDICINE

## 2019-06-07 PROCEDURE — 99223 PR INITIAL HOSPITAL CARE,LEVL III: ICD-10-PCS | Mod: ,,, | Performed by: HOSPITALIST

## 2019-06-07 PROCEDURE — 93010 EKG 12-LEAD: ICD-10-PCS | Mod: ,,, | Performed by: INTERNAL MEDICINE

## 2019-06-07 PROCEDURE — 99285 PR EMERGENCY DEPT VISIT,LEVEL V: ICD-10-PCS | Mod: ,,, | Performed by: EMERGENCY MEDICINE

## 2019-06-07 PROCEDURE — 83605 ASSAY OF LACTIC ACID: CPT

## 2019-06-07 PROCEDURE — 96361 HYDRATE IV INFUSION ADD-ON: CPT

## 2019-06-07 PROCEDURE — 83735 ASSAY OF MAGNESIUM: CPT

## 2019-06-07 PROCEDURE — 83880 ASSAY OF NATRIURETIC PEPTIDE: CPT

## 2019-06-07 PROCEDURE — 25000003 PHARM REV CODE 250: Performed by: HOSPITALIST

## 2019-06-07 PROCEDURE — 84484 ASSAY OF TROPONIN QUANT: CPT | Mod: 91

## 2019-06-07 PROCEDURE — 80053 COMPREHEN METABOLIC PANEL: CPT

## 2019-06-07 PROCEDURE — 85610 PROTHROMBIN TIME: CPT

## 2019-06-07 PROCEDURE — 25000003 PHARM REV CODE 250: Performed by: EMERGENCY MEDICINE

## 2019-06-07 PROCEDURE — 96360 HYDRATION IV INFUSION INIT: CPT

## 2019-06-07 PROCEDURE — 25500020 PHARM REV CODE 255: Performed by: EMERGENCY MEDICINE

## 2019-06-07 PROCEDURE — 85379 FIBRIN DEGRADATION QUANT: CPT

## 2019-06-07 PROCEDURE — 85025 COMPLETE CBC W/AUTO DIFF WBC: CPT

## 2019-06-07 PROCEDURE — 93010 ELECTROCARDIOGRAM REPORT: CPT | Mod: ,,, | Performed by: INTERNAL MEDICINE

## 2019-06-07 RX ORDER — SODIUM CHLORIDE 0.9 % (FLUSH) 0.9 %
10 SYRINGE (ML) INJECTION
Status: DISCONTINUED | OUTPATIENT
Start: 2019-06-07 | End: 2019-06-11 | Stop reason: HOSPADM

## 2019-06-07 RX ORDER — FAMOTIDINE 20 MG/1
20 TABLET, FILM COATED ORAL 2 TIMES DAILY
Status: DISCONTINUED | OUTPATIENT
Start: 2019-06-07 | End: 2019-06-07

## 2019-06-07 RX ORDER — ATORVASTATIN CALCIUM 20 MG/1
20 TABLET, FILM COATED ORAL DAILY
Status: DISCONTINUED | OUTPATIENT
Start: 2019-06-08 | End: 2019-06-11 | Stop reason: HOSPADM

## 2019-06-07 RX ORDER — ACETAMINOPHEN 325 MG/1
650 TABLET ORAL EVERY 4 HOURS PRN
Status: DISCONTINUED | OUTPATIENT
Start: 2019-06-07 | End: 2019-06-11 | Stop reason: HOSPADM

## 2019-06-07 RX ORDER — OXYCODONE HYDROCHLORIDE 5 MG/1
5 TABLET ORAL EVERY 6 HOURS PRN
Status: DISCONTINUED | OUTPATIENT
Start: 2019-06-07 | End: 2019-06-11 | Stop reason: HOSPADM

## 2019-06-07 RX ORDER — ENOXAPARIN SODIUM 100 MG/ML
40 INJECTION SUBCUTANEOUS EVERY 24 HOURS
Status: DISCONTINUED | OUTPATIENT
Start: 2019-06-07 | End: 2019-06-11 | Stop reason: HOSPADM

## 2019-06-07 RX ORDER — ASPIRIN 81 MG/1
81 TABLET ORAL DAILY
Status: DISCONTINUED | OUTPATIENT
Start: 2019-06-08 | End: 2019-06-11 | Stop reason: HOSPADM

## 2019-06-07 RX ORDER — OXYCODONE HYDROCHLORIDE 10 MG/1
10 TABLET ORAL EVERY 6 HOURS PRN
Status: DISCONTINUED | OUTPATIENT
Start: 2019-06-07 | End: 2019-06-11 | Stop reason: HOSPADM

## 2019-06-07 RX ORDER — ONDANSETRON 2 MG/ML
4 INJECTION INTRAMUSCULAR; INTRAVENOUS EVERY 8 HOURS PRN
Status: DISCONTINUED | OUTPATIENT
Start: 2019-06-07 | End: 2019-06-11 | Stop reason: HOSPADM

## 2019-06-07 RX ORDER — LIDOCAINE 50 MG/G
1 PATCH TOPICAL
Status: DISCONTINUED | OUTPATIENT
Start: 2019-06-08 | End: 2019-06-10

## 2019-06-07 RX ORDER — PROMETHAZINE HYDROCHLORIDE 25 MG/1
25 TABLET ORAL EVERY 6 HOURS PRN
Status: DISCONTINUED | OUTPATIENT
Start: 2019-06-07 | End: 2019-06-11 | Stop reason: HOSPADM

## 2019-06-07 RX ORDER — CHOLECALCIFEROL (VITAMIN D3) 25 MCG
1000 TABLET ORAL DAILY
Status: DISCONTINUED | OUTPATIENT
Start: 2019-06-08 | End: 2019-06-11 | Stop reason: HOSPADM

## 2019-06-07 RX ORDER — SODIUM CHLORIDE 0.9 % (FLUSH) 0.9 %
10 SYRINGE (ML) INJECTION
Status: DISCONTINUED | OUTPATIENT
Start: 2019-06-07 | End: 2019-06-07

## 2019-06-07 RX ADMIN — ENOXAPARIN SODIUM 40 MG: 100 INJECTION SUBCUTANEOUS at 10:06

## 2019-06-07 RX ADMIN — OXYCODONE HYDROCHLORIDE 10 MG: 10 TABLET ORAL at 10:06

## 2019-06-07 RX ADMIN — SODIUM CHLORIDE 1000 ML: 0.9 INJECTION, SOLUTION INTRAVENOUS at 03:06

## 2019-06-07 RX ADMIN — IOHEXOL 100 ML: 350 INJECTION, SOLUTION INTRAVENOUS at 07:06

## 2019-06-07 RX ADMIN — SODIUM CHLORIDE 1000 ML: 0.9 INJECTION, SOLUTION INTRAVENOUS at 04:06

## 2019-06-07 RX ADMIN — FAMOTIDINE 20 MG: 20 TABLET, FILM COATED ORAL at 10:06

## 2019-06-07 NOTE — ED TRIAGE NOTES
"Pt was in rehab and was hypotensive as reported by EMS . Pt felt "flutters" and palpitations". Pt denies chest pain, sweating, n/v/d. Denies blood in stools. Pt denies pain anywhere at this time. No headache. No dizziness. Speaking full sentences. Denies feeling sob at the time.       Family not present at this time          Psychosocial:  Patient is calm and cooperative.  Patients insight and judgement are appropriate to situation.  Appears clean, well maintained, with clothing appropriate to environment.  No evidence of delusions, hallucinations, or psychosis.     Neuro:  Eyes open spontaneously.  Awake, alert, oriented x 4.  Speech clear and appropriate.  Tolerating saliva secretions well.  Able to follow commands, demonstrating ability to actively and appropriately communicate within context of current conversation.  Symmetrical facial muscles.  Moving all extremities well with no noted weakness.    Movement is purposeful.  No evidence of impaired sensation.  Response to external stimuli appropriately.  No noted drifts.     Airway:  Bilateral chest rise and fall.  RR regular and non-labored.  Air entry patent with and clear x 5 lobes of the lungs.  No crepitus or subcutaneous emphysema noted on palpation.       Circulatory:  Skin warm, dry, and pink.  Apical and radial pulses strong and regular.  Capillary refill/skin blanching less than 3 seconds to distal of 4 extremities.     Abdomen:  Abdomen soft and non-distended.  Positive normo-active bowel sounds x 4 quadrants.       Urinary: Denies pressure, frequency, urgency, odor or pain.     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 Pt in rehab for right hip replacement that happened in may. .      Skin:  Intact with no bruising/discolorations noted.    "

## 2019-06-07 NOTE — ED PROVIDER NOTES
"Encounter Date: 6/7/2019    SCRIBE #1 NOTE: I, Nandini Foster, am scribing for, and in the presence of,  Dr. Greene. I have scribed the following portions of the note - Other sections scribed: HPI, ROS, PE.       History     Chief Complaint   Patient presents with    Atrial Fibrillation     via EMS, from [facility] rehab; new onset afib, rate 160s, patient reports feeling dizzy/weak prior to onset     58 y.o. male s/p right hip replacement in May 2019 with medical conditions including HTN and HLD, presents with complaint of atrial fibrillation. Patient states he was sitting in the rehab hospital dosing off in a chair waiting on his 3rd OT session of the day when he began to feel heart palpitations which he describes as a "flutter" with associated dizziness and generalized weakness. Denies associated SOB or cp. Patient reports performing two therapy sessions earlier today without complication. Patient states he was started on Robaxin last night(last dose this morning.) Patient also compliant with Losartan and Celebrex.     The history is provided by the patient.     Review of patient's allergies indicates:   Allergen Reactions    Tramadol      Other reaction(s): Dizziness     History reviewed. No pertinent past medical history.  History reviewed. No pertinent surgical history.  History reviewed. No pertinent family history.  Social History     Tobacco Use    Smoking status: Unknown If Ever Smoked    Smokeless tobacco: Never Used   Substance Use Topics    Alcohol use: Not on file    Drug use: Not on file     Review of Systems   Constitutional: Negative for chills and fever.   HENT: Negative for rhinorrhea and sore throat.    Eyes: Negative for visual disturbance.   Respiratory: Negative for cough and shortness of breath.    Cardiovascular: Positive for palpitations. Negative for chest pain.   Gastrointestinal: Negative for nausea and vomiting.   Genitourinary: Negative for dysuria and hematuria.   Musculoskeletal: " Negative for back pain and gait problem.   Skin: Negative for rash.   Neurological: Negative for weakness and numbness.       Physical Exam     Initial Vitals   BP Pulse Resp Temp SpO2   06/07/19 1455 06/07/19 1455 06/07/19 1455 06/07/19 1546 06/07/19 1455   106/70 (!) 146 20 97.7 °F (36.5 °C) 95 %      MAP       --                Physical Exam    Nursing note and vitals reviewed.  Constitutional: He appears well-developed. No distress.   Well appearing   Eyes: EOM are normal. Pupils are equal, round, and reactive to light.   Neck: Normal range of motion. Neck supple.   Cardiovascular: Intact distal pulses.   Tachycardic. Irregular rhythm   Pulmonary/Chest: No respiratory distress.   Abdominal: Soft. He exhibits no distension.   Musculoskeletal:   Mild swelling to the RLE compared to the LLE but patient states this is chronic. No calf or lower extremity tenderness   Neurological: He is alert and oriented to person, place, and time.   Skin: No rash noted.         ED Course   Procedures  Labs Reviewed   CBC W/ AUTO DIFFERENTIAL - Abnormal; Notable for the following components:       Result Value    RBC 4.52 (*)     Hemoglobin 13.0 (*)     MPV 8.8 (*)     Immature Grans (Abs) 0.05 (*)     Lymph% 17.5 (*)     All other components within normal limits   COMPREHENSIVE METABOLIC PANEL - Abnormal; Notable for the following components:    CO2 18 (*)     Glucose 139 (*)     Albumin 3.3 (*)     Alkaline Phosphatase 143 (*)     All other components within normal limits   D DIMER, QUANTITATIVE - Abnormal; Notable for the following components:    D-Dimer 6.93 (*)     All other components within normal limits   URINALYSIS, REFLEX TO URINE CULTURE - Abnormal; Notable for the following components:    Appearance, UA Hazy (*)     Protein, UA 2+ (*)     Ketones, UA Trace (*)     Bilirubin (UA) 2+ (*)     All other components within normal limits    Narrative:     Preferred Collection Type->Urine, Clean Catch  GRAY AND YELLOW    URINALYSIS MICROSCOPIC - Abnormal; Notable for the following components:    Bacteria Moderate (*)     Hyaline Casts,  (*)     All other components within normal limits    Narrative:     Preferred Collection Type->Urine, Clean Catch  GRAY AND YELLOW   B-TYPE NATRIURETIC PEPTIDE   LACTIC ACID, PLASMA   MAGNESIUM   TROPONIN I   TSH   PROTIME-INR   SEDIMENTATION RATE   SEDIMENTATION RATE     EKG Readings: (Independently Interpreted)   Rhythm: Atrial Fibrillation. Heart Rate: 139.       Imaging Results          CTA Chest Non-Coronary - PE Study (Final result)  Result time 06/07/19 19:41:32    Final result by Holden Tran MD (06/07/19 19:41:32)                 Impression:      1. No pulmonary thromboembolism identified.  2. Mild patchy ground-glass opacification throughout the lungs bilaterally suggesting mild pulmonary edema versus nonspecific infectious/inflammatory process.    Electronically signed by resident: Tarik Thorne  Date:    06/07/2019  Time:    19:17    Electronically signed by: Holden Tran MD  Date:    06/07/2019  Time:    19:41             Narrative:    EXAMINATION:  CTA CHEST NON CORONARY    CLINICAL HISTORY:  Chest pain, acute, PE suspected, intermed prob, positive D-dimer;    TECHNIQUE:  Low dose axial images, sagittal and coronal reformations were obtained from the thoracic inlet to the lung bases following the IV administration of 100 mL of Omnipaque 350.  Contrast timing was optimized to evaluate the pulmonary arteries.  MIP images were performed.    COMPARISON:  Radiograph 06/07/2019    FINDINGS:  The vascular and soft tissues structures at the base of the neck are unremarkable.    There is a left-sided aortic arch with 3 branch vessels. The aorta is normal in caliber, contour, and course without significant calcific atherosclerosis.    There is no cardiac enlargement or pericardial fluid.    There is no axillary, hilar, or mediastinal lymphadenopathy.    The pulmonary arteries  distribute normally.  This study is adequate for the evaluation of pulmonary thromboembolism. There is no filling defect of the pulmonary arteries to suggest pulmonary thromboembolism.    The trachea is midline and the proximal airways are patent.  The lungs are symmetrically expanded without mass, consolidation, or pneumothorax.  Bilateral dependent atelectasis.  Mild patchy ground-glass opacification throughout the lungs.  There is no pleural fluid.    The esophagus is normal in caliber and contour.  The imaged intra-abdominal structures demonstrate no acute abnormality.    The osseous structures demonstrate degenerative changes without acute fracture or bony destructive process.                               X-Ray Chest AP Portable (Final result)  Result time 06/07/19 15:56:41    Final result by Fatmata Walker MD (06/07/19 15:56:41)                 Impression:      Normal.      Electronically signed by: Fatmata Walker  Date:    06/07/2019  Time:    15:56             Narrative:    EXAMINATION:  XR CHEST AP PORTABLE    CLINICAL HISTORY:  a fib;    TECHNIQUE:  Single frontal view of the chest was performed.    COMPARISON:  None    FINDINGS:  Single AP portable view 15:33.    Heart and hilar shadows and trachea and mediastinal contours are normal.  No pneumothorax or pleural effusion.  Lungs are clear.  There are overlying leads.  No nodule.  Small inspiratory exam with semi lordotic positioning.                                 Medical Decision Making:   History:   Old Medical Records: I decided to obtain old medical records.  Old Records Summarized: other records.       <> Summary of Records: Admitted here on May 27-29 for right hip replacement. Patient did well post op. Discharged to home with PT with percocet 15.   Initial Assessment:   57 yo m, PMH as above, here with sudden onset dizziness, palpitations in rehab from above R hip replacement  At rehab, in a fib with RVR and hypotensive    On arrival, still in a  fib with RVR and hypotensive.  Denies CP/SOB.  No h/o etoh abuse.  No h/o a fib.    Pt comfortable, well-appearing    EKG a fib with RVR  Differential Diagnosis:   In terms of triggers, could be recent surgery but another concern would be a PE.  Pt denies CP/SOB but borderline hypoxic on arrival.  Did start robaxin last night but i'm not aware of this drug as a trigger for a fib?  Independently Interpreted Test(s):   I have ordered and independently interpreted EKG Reading(s) - see prior notes  Clinical Tests:   Lab Tests: Ordered and Reviewed  Radiological Study: Ordered and Reviewed  Medical Tests: Ordered and Reviewed  ED Management:  IVF on arrival given hypotension, holding off     5:22 PM  VS have improved/nearly normalized s/p 2L NS  ddimer very elevated so CTA ordered    7:24 PM  Pt has spontaneously converted back to NSR and VS have all normalized  Awaiting CTA            Scribe Attestation:   Scribe #1: I performed the above scribed service and the documentation accurately describes the services I performed. I attest to the accuracy of the note.    I, Dr. Jennifer Greene, personally performed the services described in this documentation. All medical record entries made by the scribe were at my direction and in my presence.  I have reviewed the chart and agree that the record reflects my personal performance and is accurate and complete. Jennifer Greene MD.  5:23 PM 06/08/2019           Clinical Impression:       ICD-10-CM ICD-9-CM   1. Hypotension, unspecified hypotension type I95.9 458.9   2. Tachycardia R00.0 785.0   3. Atrial fibrillation I48.91 427.31                                Jennifer Greene MD  06/08/19 1844

## 2019-06-08 LAB
ANION GAP SERPL CALC-SCNC: 8 MMOL/L (ref 8–16)
BASOPHILS # BLD AUTO: 0.03 K/UL (ref 0–0.2)
BASOPHILS NFR BLD: 0.5 % (ref 0–1.9)
BUN SERPL-MCNC: 14 MG/DL (ref 6–20)
CALCIUM SERPL-MCNC: 8.8 MG/DL (ref 8.7–10.5)
CHLORIDE SERPL-SCNC: 113 MMOL/L (ref 95–110)
CO2 SERPL-SCNC: 22 MMOL/L (ref 23–29)
CREAT SERPL-MCNC: 0.6 MG/DL (ref 0.5–1.4)
DIFFERENTIAL METHOD: ABNORMAL
EOSINOPHIL # BLD AUTO: 0.2 K/UL (ref 0–0.5)
EOSINOPHIL NFR BLD: 3.9 % (ref 0–8)
ERYTHROCYTE [DISTWIDTH] IN BLOOD BY AUTOMATED COUNT: 14.4 % (ref 11.5–14.5)
EST. GFR  (AFRICAN AMERICAN): >60 ML/MIN/1.73 M^2
EST. GFR  (NON AFRICAN AMERICAN): >60 ML/MIN/1.73 M^2
ESTIMATED AVG GLUCOSE: 97 MG/DL (ref 68–131)
GLUCOSE SERPL-MCNC: 84 MG/DL (ref 70–110)
HBA1C MFR BLD HPLC: 5 % (ref 4–5.6)
HCT VFR BLD AUTO: 34.2 % (ref 40–54)
HGB BLD-MCNC: 11.1 G/DL (ref 14–18)
IMM GRANULOCYTES # BLD AUTO: 0.04 K/UL (ref 0–0.04)
IMM GRANULOCYTES NFR BLD AUTO: 0.7 % (ref 0–0.5)
LYMPHOCYTES # BLD AUTO: 1.9 K/UL (ref 1–4.8)
LYMPHOCYTES NFR BLD: 34.6 % (ref 18–48)
MAGNESIUM SERPL-MCNC: 1.9 MG/DL (ref 1.6–2.6)
MCH RBC QN AUTO: 28.7 PG (ref 27–31)
MCHC RBC AUTO-ENTMCNC: 32.5 G/DL (ref 32–36)
MCV RBC AUTO: 88 FL (ref 82–98)
MONOCYTES # BLD AUTO: 0.6 K/UL (ref 0.3–1)
MONOCYTES NFR BLD: 10 % (ref 4–15)
NEUTROPHILS # BLD AUTO: 2.8 K/UL (ref 1.8–7.7)
NEUTROPHILS NFR BLD: 50.3 % (ref 38–73)
NRBC BLD-RTO: 0 /100 WBC
PHOSPHATE SERPL-MCNC: 3.4 MG/DL (ref 2.7–4.5)
PLATELET # BLD AUTO: 254 K/UL (ref 150–350)
PMV BLD AUTO: 9 FL (ref 9.2–12.9)
POTASSIUM SERPL-SCNC: 3.6 MMOL/L (ref 3.5–5.1)
RBC # BLD AUTO: 3.87 M/UL (ref 4.6–6.2)
SODIUM SERPL-SCNC: 143 MMOL/L (ref 136–145)
TROPONIN I SERPL DL<=0.01 NG/ML-MCNC: 0.1 NG/ML (ref 0–0.03)
WBC # BLD AUTO: 5.61 K/UL (ref 3.9–12.7)

## 2019-06-08 PROCEDURE — 20600001 HC STEP DOWN PRIVATE ROOM

## 2019-06-08 PROCEDURE — 25000003 PHARM REV CODE 250: Performed by: HOSPITALIST

## 2019-06-08 PROCEDURE — 85025 COMPLETE CBC W/AUTO DIFF WBC: CPT

## 2019-06-08 PROCEDURE — 80048 BASIC METABOLIC PNL TOTAL CA: CPT

## 2019-06-08 PROCEDURE — 93010 EKG 12-LEAD: ICD-10-PCS | Mod: ,,, | Performed by: INTERNAL MEDICINE

## 2019-06-08 PROCEDURE — 84100 ASSAY OF PHOSPHORUS: CPT

## 2019-06-08 PROCEDURE — 97161 PT EVAL LOW COMPLEX 20 MIN: CPT

## 2019-06-08 PROCEDURE — 83735 ASSAY OF MAGNESIUM: CPT

## 2019-06-08 PROCEDURE — 84484 ASSAY OF TROPONIN QUANT: CPT

## 2019-06-08 PROCEDURE — 93005 ELECTROCARDIOGRAM TRACING: CPT

## 2019-06-08 PROCEDURE — 99232 SBSQ HOSP IP/OBS MODERATE 35: CPT | Mod: ,,, | Performed by: HOSPITALIST

## 2019-06-08 PROCEDURE — 93010 ELECTROCARDIOGRAM REPORT: CPT | Mod: ,,, | Performed by: INTERNAL MEDICINE

## 2019-06-08 PROCEDURE — 97116 GAIT TRAINING THERAPY: CPT

## 2019-06-08 PROCEDURE — 99232 PR SUBSEQUENT HOSPITAL CARE,LEVL II: ICD-10-PCS | Mod: ,,, | Performed by: HOSPITALIST

## 2019-06-08 PROCEDURE — 63600175 PHARM REV CODE 636 W HCPCS: Performed by: HOSPITALIST

## 2019-06-08 PROCEDURE — 36415 COLL VENOUS BLD VENIPUNCTURE: CPT

## 2019-06-08 RX ORDER — LANOLIN ALCOHOL/MO/W.PET/CERES
800 CREAM (GRAM) TOPICAL ONCE
Status: COMPLETED | OUTPATIENT
Start: 2019-06-08 | End: 2019-06-08

## 2019-06-08 RX ORDER — VERAPAMIL HYDROCHLORIDE 180 MG/1
180 TABLET, FILM COATED, EXTENDED RELEASE ORAL DAILY
Status: DISCONTINUED | OUTPATIENT
Start: 2019-06-09 | End: 2019-06-11 | Stop reason: HOSPADM

## 2019-06-08 RX ORDER — POTASSIUM CHLORIDE 20 MEQ/1
40 TABLET, EXTENDED RELEASE ORAL ONCE
Status: COMPLETED | OUTPATIENT
Start: 2019-06-08 | End: 2019-06-08

## 2019-06-08 RX ORDER — LOSARTAN POTASSIUM 50 MG/1
100 TABLET ORAL DAILY
Status: DISCONTINUED | OUTPATIENT
Start: 2019-06-08 | End: 2019-06-11 | Stop reason: HOSPADM

## 2019-06-08 RX ADMIN — POTASSIUM CHLORIDE 40 MEQ: 1500 TABLET, EXTENDED RELEASE ORAL at 09:06

## 2019-06-08 RX ADMIN — VITAMIN D, TAB 1000IU (100/BT) 1000 UNITS: 25 TAB at 09:06

## 2019-06-08 RX ADMIN — LOSARTAN POTASSIUM 100 MG: 50 TABLET, FILM COATED ORAL at 04:06

## 2019-06-08 RX ADMIN — ATORVASTATIN CALCIUM 20 MG: 20 TABLET, FILM COATED ORAL at 09:06

## 2019-06-08 RX ADMIN — LIDOCAINE 1 PATCH: 50 PATCH TOPICAL at 12:06

## 2019-06-08 RX ADMIN — MAGNESIUM OXIDE TAB 400 MG (241.3 MG ELEMENTAL MG) 800 MG: 400 (241.3 MG) TAB at 09:06

## 2019-06-08 RX ADMIN — OXYCODONE HYDROCHLORIDE 10 MG: 10 TABLET ORAL at 04:06

## 2019-06-08 RX ADMIN — ENOXAPARIN SODIUM 40 MG: 100 INJECTION SUBCUTANEOUS at 04:06

## 2019-06-08 RX ADMIN — ASPIRIN 81 MG: 81 TABLET, COATED ORAL at 09:06

## 2019-06-08 NOTE — PT/OT/SLP EVAL
Physical Therapy Evaluation    Patient Name:  Junior Beatty   MRN:  [id number]    Recommendations:     Discharge Recommendations:  rehabilitation facility   Discharge Equipment Recommendations: none   Barriers to discharge: Inaccessible home and Decreased caregiver support    Assessment:     Junior Beatty is a 58 y.o. male admitted with a medical diagnosis of Atrial fibrillation with RVR.  He presents with the following impairments/functional limitations:  weakness, impaired functional mobilty, decreased lower extremity function, impaired cardiopulmonary response to activity, impaired endurance. Pt admitted to [facility] from IP rehab for a fib and RVR. Pt has 8 VALENCIA home and presents with impaired gait due to recent hip replacement (posterior hip precautions). Recommend return to IP rehab upon discharge to ensure safety and maximize functional mobility before returning home alone.     Rehab Prognosis: Good; patient would benefit from acute skilled PT services to address these deficits and reach maximum level of function.    Recent Surgery: * No surgery found *      Plan:     During this hospitalization, patient to be seen 3 x/week to address the identified rehab impairments via gait training, therapeutic activities, therapeutic exercises, neuromuscular re-education and progress toward the following goals:    · Plan of Care Expires:  07/07/19    Subjective     Chief Complaint: pain  Patient/Family Comments/goals: to get better and return to IP rehab   Pain/Comfort:  · Pain Rating 1: 5/10(R hip)  · Pain Addressed 1: Distraction, Reposition  · Pain Rating Post-Intervention 1: 5/10    Patients cultural, spiritual, Gnosticism conflicts given the current situation: no    Living Environment:  Pt lives alone in a H with 8 VALENCIA and R HR.   Prior to admission, patients level of function was mod indep with ambulation (RW) and mod indep with ADLs.  Equipment used at home: walker, rolling.  DME owned (not currently used): none.  Upon  discharge, patient will not have assistance.    Objective:     Communicated with RN prior to session and friend present in room.  Patient found HOB elevated with telemetry  upon PT entry to room.    General Precautions: Standard, fall   Orthopedic Precautions:RLE posterior precautions   Braces: N/A     Exams:  · Cognitive Exam:    · AAOx4  · Follows multistep commands  · Communication clear/fluent   · RLE ROM: WFL except hip flexion not tested 2nd to hip precautions   · RLE Strength: WFL except hip flexion not tested   · LLE ROM: WFL  · LLE Strength: WFL    Pt with congenital deformity of B UEs. B UEs functional     Functional Mobility:  · Bed Mobility:     · Scooting: minimum assistance  · Supine to Sit: minimum assistance  · Transfers:     · Sit to Stand:  stand by assistance with rolling walker from EOB   · Gait: 30ft with SBA using RW; cuing to maintain posterior hip precautions; antalgic gait pattern; pt demo'd decreased richard      Therapeutic Activities and Exercises:  Educated pt on PT role/POC  Educated pt on importance of OOB activity and daily ambulation  Educated pt on posterior hip precautions.  Pt verbalized understanding      T/f to chair to increase tolerance to OOB activity and to create optimal positioning for lung expansion     AM-PAC 6 CLICK MOBILITY  Total Score:17     Patient left up in chair with all lines intact, call button in reach, RN notified and friend present.    GOALS:   Multidisciplinary Problems     Physical Therapy Goals        Problem: Physical Therapy Goal    Goal Priority Disciplines Outcome Goal Variances Interventions   Physical Therapy Goal     PT, PT/OT Ongoing (interventions implemented as appropriate)     Description:  Goals to be met by: 2019    Patient will increase functional independence with mobility by performin. Gait  x 100 feet with Supervision using Rolling Walker. - not met  2. Ascend/descend 8 stair with right Handrails CGA  - not met                       History:     History reviewed. No pertinent past medical history.    History reviewed. No pertinent surgical history.    Time Tracking:     PT Received On: 06/08/19  PT Start Time: 0917     PT Stop Time: 0941  PT Total Time (min): 24 min     Billable Minutes: Evaluation 10 and Gait Training 8      Vernell Gottlieb, PT, DPT  6/8/2019  [phone]

## 2019-06-08 NOTE — PLAN OF CARE
Problem: Adult Inpatient Plan of Care  Goal: Plan of Care Review  Pt remained free of injuries, falls, and trauma. Pt was hypertensive at first, d/t moving from stretcher. No complaints of SOB or palpitations noted. In ED, EKG showed A fib w/ RVR and BP was 80/50; Bolus received, pt converted back to NSR. No rate/rhythm  medications yet. Pain management for recent R hip replacement w/ PRN medications. All questions and concerns addressed. No complaints at this time. Will continue to monitor.

## 2019-06-08 NOTE — PLAN OF CARE
Problem: Adult Inpatient Plan of Care  Goal: Plan of Care Review  Pt is AAOx4. Was an assist of 1 for transfer to the chair and ambulating to the bathroom. Has sat in the chair for the majority of the day. Has a walker that he may use in the room if he needs to. Did have echo done. Has ate well and denies any chest pain or SOB. Dressing to right hips is dry and intact. Did educate pt on hip precautions and pt states this is his 2nd hip and he understands the precautions. Did receive pain meds for hip this afternoon and states it has relieved his pain. Will cont to monitor.

## 2019-06-08 NOTE — PLAN OF CARE
Problem: Physical Therapy Goal  Goal: Physical Therapy Goal  Goals to be met by: 2019    Patient will increase functional independence with mobility by performin. Gait  x 100 feet with Supervision using Rolling Walker. - not met  2. Ascend/descend 8 stair with right Handrails CGA  - not met    Outcome: Ongoing (interventions implemented as appropriate)  Eval completed and goals appropriate

## 2019-06-08 NOTE — PROGRESS NOTES
Progress Note  Hospital Medicine    Primary Team: [facility]  Admit Date: 6/7/2019   Length of Stay:  LOS: 1 day   SUBJECTIVE:   Reason for Admission:  Atrial fibrillation with RVR    HPI:  Junior Beatty is a 58 y.o. man with a history of HTN, HLD, Vitamin-D deficiency, Arthrogryposis Multiplex Congenita, and Recent R Hip Replacement (currently at [facility]) who presents to [facility] complaining of sudden onset palpitations afternoon.     Patient was recently admitted to [facility] for planned right hip replacement.  Procedure went well no complications, and he was transition to inpatient rehab.  At [facility], he had been doing well and tolerated his 1st 2 sessions today with no issues.  However just prior to his 3rd session he had acute onset palpitations and lightheadedness.  Vitals were taken at [facility] and he was found to be hypotensive and tachycardic; he was transferred to [facility] ED for further evaluation.  During these episodes of palpitations, he denies chest pain, shortness of breath, nausea, vomiting, and diaphoresis.  He denies recent worsening PEREZ, orthopnea, PND, and lower extremity edema. He denies recent illnesses or sick contacts, fevers, chills, and night sweats.  He denies recent hot or cold intolerance, excessive swelling, dysphagia, odynophagia, and globus sensation.  He has never had episodes such as this before.  He has no family history of atrial fibrillation or early CAD.  He reports that his pain has been recently well controlled with Tylenol and p.r.n. oxycodone.  He does note that he was started on Robaxin for multi modality pain control yesterday evening.  Of note, patient has a history of Arthrogryposis Multiplex Congenita, and has had several orthopedic surgeries to improve mobility in his joint.     In the ED, EKG showed AFib with RVR with initial heart rate of 140 and blood pressure as low as 87/58.  He was bolused 2 L normal saline with spontaneous conversion to normal sinus rhythm, confirmed on  repeat 12 lead EKG.  He did not receive any rate or rhythm control agents.  Currently, he denies palpitations, chest pain, weakness, and lightheadedness - he reports that he feels at his baseline.  Labs notable for hemoglobin:  13.0, normal PT/INR, normal electrolytes and creatinine, largely normal LFTs aside from alk-phos:  145, troponin negative, lactate normal, UA with 2+ protein, TSH normal:  1.26.  D-dimer was elevated at 6.93, but CTA Chest showed no acute pulmonary embolism.    Interval history:    No acute events overnight.  Pt had just worked with PT and has some right hip pain now.  Requesting hip abductor pillow.  Denies palpitations, chest pain, SOB.    Review of Systems:  Constitutional: no fever or chills  Respiratory: no cough or shortness of breath  Cardiovascular: no chest pain or palpitations  Gastrointestinal: no nausea or vomiting, no abdominal pain or change in bowel habits  Musculoskeletal: no arthralgias or myalgias     OBJECTIVE:     Temp:  [97.7 °F (36.5 °C)-98.4 °F (36.9 °C)]   Pulse:  []   Resp:  [16-20]   BP: ()/(51-85)   SpO2:  [91 %-98 %]  Body mass index is 35.22 kg/m².  Intake/Outake:  This Shift:  No intake/output data recorded.    Net I/O past 24h:     Intake/Output Summary (Last 24 hours) at 6/8/2019 1511  Last data filed at 6/8/2019 0400  Gross per 24 hour   Intake 2360 ml   Output 700 ml   Net 1660 ml             Physical Exam:  Gen- well-developed, well-nourished, NAD  CVS- S1 and S2 present, RRR, no murmurs  Resp- CTA b/l, no work of breathing  Abd- BS+, soft, NT, ND  Ext- no clubbing or cyanosis, RLE >LLE    Laboratory:  CBC/Anemia Labs: Coags:    Recent Labs   Lab 06/07/19  1525 06/08/19  0454   WBC 9.30 5.61   HGB 13.0* 11.1*   HCT 40.0 34.2*    254   MCV 89 88   RDW 14.1 14.4    Recent Labs   Lab 06/07/19  1526   INR 0.9        Chemistries:   Recent Labs   Lab 06/07/19  1525 06/08/19  0454    143   K 3.8 3.6    113*   CO2 18* 22*   BUN 19 14    CREATININE 0.8 0.6   CALCIUM 9.1 8.8   PROT 6.5  --    BILITOT 0.4  --    ALKPHOS 143*  --    ALT 33  --    AST 30  --    MG 2.1 1.9   PHOS  --  3.4        Cardiac Enzymes: Ejection Fractions:    Recent Labs     06/07/19  1525 06/07/19  2229 06/08/19  0454   TROPONINI 0.008 0.125* 0.102*    No results found for: EF      Medications:  Scheduled Meds:   aspirin  81 mg Oral Daily    atorvastatin  20 mg Oral Daily    enoxaparin  40 mg Subcutaneous Daily    lidocaine  1 patch Transdermal Q24H    vitamin D  1,000 Units Oral Daily                             Continuous Infusions:  PRN Meds:.acetaminophen, ondansetron, oxyCODONE, oxyCODONE, promethazine, sodium chloride 0.9%     ASSESSMENT/PLAN:     Active Hospital Problems    Diagnosis  POA    *Atrial fibrillation with RVR [I48.91]  Yes    Hypotension [I95.9]  Yes      Resolved Hospital Problems   No resolved problems to display.     Newly Diagnosed Atrial Fibrillation with RVR  · Admit to [facility] for further evaluation and management.  Based on current available data, CHADSVASc: 1 (point for HTN) and HASBLED: 2.  In the ED, EKG showed AFib with RVR with initial heart rate of 140 and blood pressure as low as 87/58.  He was bolused 2 L normal saline with spontaneous conversion to normal sinus rhythm, confirmed on repeat 12 lead EKG.   · Continues telemetry while inpatient  · Will hold on anticoagulation for now given CHADSVASc: 1 and spontaneous conversion to NSR with IV fluid alone  · Will hold on rate and rhythm control agents for now as patient spontaneously converted to NSR  · Follow-up TTE and A1c, readjust CHADSVASc based on results if necessary  · Follow-up repeat troponin- downtrending  · Replete potassium greater than 4.0 and magnesium greater than 2.0     HTN  · Resume home losartan 100 mg daily and verapamil 180 mg daily   · Goal BP less than 130/80     HLD  · Continue aspirin 81 mg daily and atorvastatin 20 mg daily     Vitamin-D deficiency  · Continue  vitamin-D 1000 units daily      Arthrogryposis Multiplex Congenita  Recent R Hip Replacement  · PT/OT consulted.  PMR consulted.  · Pain control with Tylenol 650 mg q.4 hours for mild pain, oxycodone 5 mg p.o. q.6 p.r.n. moderate pain and oxycodone 10 mg p.o. q.6 p.r.n. severe breakthrough pain  · Continue lidocaine 5% patch daily  · Holding Robaxin which was recently started at [facility]  · Fall precautions  · Contacted Orthopedics, bringing hip abductor to bedside    DVT ppx- Lovenox  CODE Status- FULL    Dispo- return to rehab Monday    Cecilia Reed MD  Hospital Medicine Staff

## 2019-06-08 NOTE — H&P
Hospital Medicine  History and Physical Exam    Team: [facility]OS[facility] MED C Chun Davis MD  Admit Date: 6/7/2019  Principal Problem:  Atrial fibrillation with RVR   Patient information was obtained from patient and ER records.   Primary care Physician: No primary care provider on file.  Code status: Full Code    CC:  Palpitations    HPI:   Junior Beatty is a 58 y.o. man with a history of HTN, HLD, Vitamin-D deficiency, Arthrogryposis Multiplex Congenita, and Recent R Hip Replacement (currently at [facility]) who presents to [facility] complaining of sudden onset palpitations afternoon.    Patient was recently admitted to [facility] for planned right hip replacement.  Procedure went well no complications, and he was transition to inpatient rehab.  At [facility], he had been doing well and tolerated his 1st 2 sessions today with no issues.  However just prior to his 3rd session he had acute onset palpitations and lightheadedness.  Vitals were taken at [facility] and he was found to be hypotensive and tachycardic; he was transferred to [facility] ED for further evaluation.  During these episodes of palpitations, he denies chest pain, shortness of breath, nausea, vomiting, and diaphoresis.  He denies recent worsening PEREZ, orthopnea, PND, and lower extremity edema. He denies recent illnesses or sick contacts, fevers, chills, and night sweats.  He denies recent hot or cold intolerance, excessive swelling, dysphagia, odynophagia, and globus sensation.  He has never had episodes such as this before.  He has no family history of atrial fibrillation or early CAD.  He reports that his pain has been recently well controlled with Tylenol and p.r.n. oxycodone.  He does note that he was started on Robaxin for multi modality pain control yesterday evening.  Of note, patient has a history of Arthrogryposis Multiplex Congenita, and has had several orthopedic surgeries to improve mobility in his joint.    In the ED, EKG showed AFib with RVR with initial heart rate  of 140 and blood pressure as low as 87/58.  He was bolused 2 L normal saline with spontaneous conversion to normal sinus rhythm, confirmed on repeat 12 lead EKG.  He did not receive any rate or rhythm control agents.  Currently, he denies palpitations, chest pain, weakness, and lightheadedness - he reports that he feels at his baseline.  Labs notable for hemoglobin:  13.0, normal PT/INR, normal electrolytes and creatinine, largely normal LFTs aside from alk-phos:  145, troponin negative, lactate normal, UA with 2+ protein, TSH normal:  1.26.  D-dimer was elevated at 6.93, but CTA Chest showed no acute pulmonary embolism.    Past Medical History: Patient has no past medical history on file.    Past Surgical History: Patient has no past surgical history on file.    Social History: Patient     Family History: family history is not on file.    Medications: reviewed     Allergies: Patient is allergic to tramadol.    ROS  Pain Scale:  3 /10   Constitutional:  no fever or chills  Respiratory: no cough or shortness of breath  Cardiovascular: no chest pain, positive palpitations  Gastrointestinal: no nausea or vomiting, no abdominal pain or change in bowel habits  Genitourinary: no hematuria or dysuria  Integument/Breast: no rash or pruritis  Hematologic/Lymphatic: no easy bruising or lymphadenopathy  Musculoskeletal:  Positive arthralgias or myalgias  Neurological: no seizures or tremors  Behavioral/Psych: no depression or anxiety    PEx  Temp:  [97.7 °F (36.5 °C)-97.8 °F (36.6 °C)]   Pulse:  []   Resp:  [16-20]   BP: ()/(51-75)   SpO2:  [93 %-98 %]   Body mass index is 35.22 kg/m².     Intake/Output Summary (Last 24 hours) at 6/7/2019 2215  Last data filed at 6/7/2019 1837  Gross per 24 hour   Intake 2000 ml   Output --   Net 2000 ml     General appearance: no distress, speaking in full sentences  Mental status: Alert and oriented x 3  HEENT:  conjunctivae/corneas clear, PERRL  Pulm:   normal respiratory  effort, CTA B, no c/w/r  Card: RRR, S1, S2 normal, no murmur, click, rub or gallop  Abd: soft, NT, ND, BS present; no masses, no organomegaly  Ext: no c/c/e, reduced flexion at bilateral upper extremity, several additional cartilage deformities of upper extremities  Pulses: 2+, symmetric  Skin: color, texture, turgor normal. No rashes or lesions    Recent Results (from the past 24 hour(s))   CBC auto differential    Collection Time: 06/07/19  3:25 PM   Result Value Ref Range    WBC 9.30 3.90 - 12.70 K/uL    RBC 4.52 (L) 4.60 - 6.20 M/uL    Hemoglobin 13.0 (L) 14.0 - 18.0 g/dL    Hematocrit 40.0 40.0 - 54.0 %    Mean Corpuscular Volume 89 82 - 98 fL    Mean Corpuscular Hemoglobin 28.8 27.0 - 31.0 pg    Mean Corpuscular Hemoglobin Conc 32.5 32.0 - 36.0 g/dL    RDW 14.1 11.5 - 14.5 %    Platelets 278 150 - 350 K/uL    MPV 8.8 (L) 9.2 - 12.9 fL    Immature Granulocytes 0.5 0.0 - 0.5 %    Gran # (ANC) 6.7 1.8 - 7.7 K/uL    Immature Grans (Abs) 0.05 (H) 0.00 - 0.04 K/uL    Lymph # 1.6 1.0 - 4.8 K/uL    Mono # 0.8 0.3 - 1.0 K/uL    Eos # 0.1 0.0 - 0.5 K/uL    Baso # 0.03 0.00 - 0.20 K/uL    nRBC 0 0 /100 WBC    Gran% 71.9 38.0 - 73.0 %    Lymph% 17.5 (L) 18.0 - 48.0 %    Mono% 8.3 4.0 - 15.0 %    Eosinophil% 1.5 0.0 - 8.0 %    Basophil% 0.3 0.0 - 1.9 %    Differential Method Automated    Brain natriuretic peptide    Collection Time: 06/07/19  3:25 PM   Result Value Ref Range    BNP 23 0 - 99 pg/mL   Comprehensive metabolic panel    Collection Time: 06/07/19  3:25 PM   Result Value Ref Range    Sodium 138 136 - 145 mmol/L    Potassium 3.8 3.5 - 5.1 mmol/L    Chloride 107 95 - 110 mmol/L    CO2 18 (L) 23 - 29 mmol/L    Glucose 139 (H) 70 - 110 mg/dL    BUN, Bld 19 6 - 20 mg/dL    Creatinine 0.8 0.5 - 1.4 mg/dL    Calcium 9.1 8.7 - 10.5 mg/dL    Total Protein 6.5 6.0 - 8.4 g/dL    Albumin 3.3 (L) 3.5 - 5.2 g/dL    Total Bilirubin 0.4 0.1 - 1.0 mg/dL    Alkaline Phosphatase 143 (H) 55 - 135 U/L    AST 30 10 - 40 U/L    ALT 33  10 - 44 U/L    Anion Gap 13 8 - 16 mmol/L    eGFR if African American >60.0 >60 mL/min/1.73 m^2    eGFR if non African American >60.0 >60 mL/min/1.73 m^2   Magnesium    Collection Time: 06/07/19  3:25 PM   Result Value Ref Range    Magnesium 2.1 1.6 - 2.6 mg/dL   Troponin I    Collection Time: 06/07/19  3:25 PM   Result Value Ref Range    Troponin I 0.008 0.000 - 0.026 ng/mL   TSH    Collection Time: 06/07/19  3:25 PM   Result Value Ref Range    TSH 1.258 0.400 - 4.000 uIU/mL   D dimer, quantitative    Collection Time: 06/07/19  3:26 PM   Result Value Ref Range    D-Dimer 6.93 (H) <0.50 mg/L FEU   Protime-INR    Collection Time: 06/07/19  3:26 PM   Result Value Ref Range    Prothrombin Time 10.0 9.0 - 12.5 sec    INR 0.9 0.8 - 1.2   Lactic acid, plasma    Collection Time: 06/07/19  3:28 PM   Result Value Ref Range    Lactate (Lactic Acid) 2.2 0.5 - 2.2 mmol/L   Urinalysis, Reflex to Urine Culture Urine, Clean Catch    Collection Time: 06/07/19  3:43 PM   Result Value Ref Range    Specimen UA Urine, Clean Catch     Color, UA Nan Yellow, Straw, Nan    Appearance, UA Hazy (A) Clear    pH, UA 5.0 5.0 - 8.0    Specific Gravity, UA 1.030 1.005 - 1.030    Protein, UA 2+ (A) Negative    Glucose, UA Negative Negative    Ketones, UA Trace (A) Negative    Bilirubin (UA) 2+ (A) Negative    Occult Blood UA Negative Negative    Nitrite, UA Negative Negative    Leukocytes, UA Negative Negative   Urinalysis Microscopic    Collection Time: 06/07/19  3:43 PM   Result Value Ref Range    RBC, UA 3 0 - 4 /hpf    WBC, UA 3 0 - 5 /hpf    Bacteria Moderate (A) None-Occ /hpf    Hyaline Casts,  (A) 0-1/lpf /lpf    Amorphous, UA Few None-Moderate    Microscopic Comment SEE COMMENT        Active Hospital Problems    Diagnosis  POA    *Atrial fibrillation with RVR [I48.91]  Yes    Hypotension [I95.9]  Yes      Resolved Hospital Problems   No resolved problems to display.       Assessment and Plan:  Junior Beatty is a 58 y.o. man  with a history of HTN, HLD, Vitamin-D deficiency, Arthrogryposis Multiplex Congenita, and Recent R Hip Replacement (currently at [facility]) who presents to [facility] complaining of sudden onset palpitations afternoon.  Admitted to Hospital Medicine for Newly Diagnosed Atrial Fibrillation with RVR    Newly Diagnosed Atrial Fibrillation with RVR  · Admit to [facility] for further evaluation and management.  Based on current available data, CHADSVASc: 1 (point for HTN) and HASBLED: 2.  In the ED, EKG showed AFib with RVR with initial heart rate of 140 and blood pressure as low as 87/58.  He was bolused 2 L normal saline with spontaneous conversion to normal sinus rhythm, confirmed on repeat 12 lead EKG.  He did not receive any rate or rhythm control agents.  Currently, he denies palpitations, chest pain, weakness, and lightheadedness - he reports that he feels at his baseline.  Labs notable for hemoglobin:  13.0, normal PT/INR, normal electrolytes and creatinine, largely normal LFTs aside from alk-phos:  145, troponin negative, lactate normal, UA with 2+ protein, TSH normal:  1.26.  D-dimer was elevated at 6.93, but CTA Chest showed no acute pulmonary embolism.  · Continues telemetry while inpatient  · Will hold on anticoagulation for now given CHADSVASc: 1 and spontaneous conversion to NSR with IV fluid alone  · Will hold on rate and rhythm control agents for now as patient spontaneously converted to NSR  · Follow-up TTE and A1c, readjust CHADSVASc based on results if necessary  · Follow-up repeat troponin  · Replete potassium greater than 4.0 and magnesium greater than 2.0    HTN  · Holding home losartan 100 mg daily and verapamil 180 mg daily given hypotension at triage and current borderline low BP following IV fluid resuscitation  · Goal BP less than 130/80    HLD  · Continue aspirin 81 mg daily and atorvastatin 20 mg daily    Vitamin-D deficiency  · Continue vitamin-D 1000 units daily     Arthrogryposis Multiplex Congenita  Recent  R Hip Replacement  · PT/OT consulted.  PMR consulted.  · Pain control with Tylenol 650 mg q.4 hours for mild pain, oxycodone 5 mg p.o. q.6 p.r.n. moderate pain and oxycodone 10 mg p.o. q.6 p.r.n. severe breakthrough pain  · Continue lidocaine 5% patch daily  · Holding Robaxin which was recently started at [facility]  · Fall precautions      DVT PPx:  Lovenox 40 mg daily  Diet:  Cardiac  --  Disposition:  Ongoing, pending medical stability  Disposition Needs:  Ongoing, pending PT/OT recommendations likely to [facility]  Follow Up:  PCP and Cardiology      Chun Davis M.D.  [facility] Medicine Staff  [facility]

## 2019-06-09 LAB
ANION GAP SERPL CALC-SCNC: 8 MMOL/L (ref 8–16)
BASOPHILS # BLD AUTO: 0.03 K/UL (ref 0–0.2)
BASOPHILS NFR BLD: 0.5 % (ref 0–1.9)
BUN SERPL-MCNC: 12 MG/DL (ref 6–20)
CALCIUM SERPL-MCNC: 9 MG/DL (ref 8.7–10.5)
CHLORIDE SERPL-SCNC: 110 MMOL/L (ref 95–110)
CO2 SERPL-SCNC: 23 MMOL/L (ref 23–29)
CREAT SERPL-MCNC: 0.6 MG/DL (ref 0.5–1.4)
DIFFERENTIAL METHOD: ABNORMAL
EOSINOPHIL # BLD AUTO: 0.4 K/UL (ref 0–0.5)
EOSINOPHIL NFR BLD: 5.8 % (ref 0–8)
ERYTHROCYTE [DISTWIDTH] IN BLOOD BY AUTOMATED COUNT: 14.3 % (ref 11.5–14.5)
EST. GFR  (AFRICAN AMERICAN): >60 ML/MIN/1.73 M^2
EST. GFR  (NON AFRICAN AMERICAN): >60 ML/MIN/1.73 M^2
GLUCOSE SERPL-MCNC: 82 MG/DL (ref 70–110)
HCT VFR BLD AUTO: 38.3 % (ref 40–54)
HGB BLD-MCNC: 12 G/DL (ref 14–18)
IMM GRANULOCYTES # BLD AUTO: 0.04 K/UL (ref 0–0.04)
IMM GRANULOCYTES NFR BLD AUTO: 0.7 % (ref 0–0.5)
LYMPHOCYTES # BLD AUTO: 2.1 K/UL (ref 1–4.8)
LYMPHOCYTES NFR BLD: 34 % (ref 18–48)
MAGNESIUM SERPL-MCNC: 2 MG/DL (ref 1.6–2.6)
MCH RBC QN AUTO: 28.3 PG (ref 27–31)
MCHC RBC AUTO-ENTMCNC: 31.3 G/DL (ref 32–36)
MCV RBC AUTO: 90 FL (ref 82–98)
MONOCYTES # BLD AUTO: 0.5 K/UL (ref 0.3–1)
MONOCYTES NFR BLD: 8.3 % (ref 4–15)
NEUTROPHILS # BLD AUTO: 3.1 K/UL (ref 1.8–7.7)
NEUTROPHILS NFR BLD: 50.7 % (ref 38–73)
NRBC BLD-RTO: 0 /100 WBC
PHOSPHATE SERPL-MCNC: 3.3 MG/DL (ref 2.7–4.5)
PLATELET # BLD AUTO: 235 K/UL (ref 150–350)
PMV BLD AUTO: 8.5 FL (ref 9.2–12.9)
POTASSIUM SERPL-SCNC: 3.9 MMOL/L (ref 3.5–5.1)
RBC # BLD AUTO: 4.24 M/UL (ref 4.6–6.2)
SODIUM SERPL-SCNC: 141 MMOL/L (ref 136–145)
WBC # BLD AUTO: 6.03 K/UL (ref 3.9–12.7)

## 2019-06-09 PROCEDURE — 36415 COLL VENOUS BLD VENIPUNCTURE: CPT

## 2019-06-09 PROCEDURE — 80048 BASIC METABOLIC PNL TOTAL CA: CPT

## 2019-06-09 PROCEDURE — 25000003 PHARM REV CODE 250: Performed by: HOSPITALIST

## 2019-06-09 PROCEDURE — 20600001 HC STEP DOWN PRIVATE ROOM

## 2019-06-09 PROCEDURE — 99232 PR SUBSEQUENT HOSPITAL CARE,LEVL II: ICD-10-PCS | Mod: ,,, | Performed by: HOSPITALIST

## 2019-06-09 PROCEDURE — 97535 SELF CARE MNGMENT TRAINING: CPT

## 2019-06-09 PROCEDURE — 85025 COMPLETE CBC W/AUTO DIFF WBC: CPT

## 2019-06-09 PROCEDURE — 99232 SBSQ HOSP IP/OBS MODERATE 35: CPT | Mod: ,,, | Performed by: HOSPITALIST

## 2019-06-09 PROCEDURE — 84100 ASSAY OF PHOSPHORUS: CPT

## 2019-06-09 PROCEDURE — 97165 OT EVAL LOW COMPLEX 30 MIN: CPT

## 2019-06-09 PROCEDURE — 63600175 PHARM REV CODE 636 W HCPCS: Performed by: HOSPITALIST

## 2019-06-09 PROCEDURE — 83735 ASSAY OF MAGNESIUM: CPT

## 2019-06-09 RX ORDER — POTASSIUM CHLORIDE 20 MEQ/1
20 TABLET, EXTENDED RELEASE ORAL ONCE
Status: COMPLETED | OUTPATIENT
Start: 2019-06-09 | End: 2019-06-09

## 2019-06-09 RX ADMIN — ASPIRIN 81 MG: 81 TABLET, COATED ORAL at 08:06

## 2019-06-09 RX ADMIN — POTASSIUM CHLORIDE 20 MEQ: 1500 TABLET, EXTENDED RELEASE ORAL at 10:06

## 2019-06-09 RX ADMIN — ATORVASTATIN CALCIUM 20 MG: 20 TABLET, FILM COATED ORAL at 08:06

## 2019-06-09 RX ADMIN — LIDOCAINE 1 PATCH: 50 PATCH TOPICAL at 12:06

## 2019-06-09 RX ADMIN — OXYCODONE HYDROCHLORIDE 10 MG: 10 TABLET ORAL at 12:06

## 2019-06-09 RX ADMIN — ENOXAPARIN SODIUM 40 MG: 100 INJECTION SUBCUTANEOUS at 05:06

## 2019-06-09 RX ADMIN — VERAPAMIL HYDROCHLORIDE 180 MG: 180 TABLET, FILM COATED, EXTENDED RELEASE ORAL at 08:06

## 2019-06-09 RX ADMIN — OXYCODONE HYDROCHLORIDE 10 MG: 10 TABLET ORAL at 10:06

## 2019-06-09 RX ADMIN — LOSARTAN POTASSIUM 100 MG: 50 TABLET, FILM COATED ORAL at 08:06

## 2019-06-09 RX ADMIN — VITAMIN D, TAB 1000IU (100/BT) 1000 UNITS: 25 TAB at 08:06

## 2019-06-09 NOTE — PLAN OF CARE
Problem: Occupational Therapy Goal  Goal: Occupational Therapy Goal  Goals to be met by: 6/19     Patient will increase functional independence with ADLs by performing:    UE Dressing with Modified George.  LE Dressing with Modified George with AE.  Grooming while standing with Modified George.  Toileting from toilet with Modified George for hygiene and clothing management.   Supine to sit with Supervision.  Stand pivot transfers with Modified George using RW.    Outcome: Ongoing (interventions implemented as appropriate)  OT eval completed.

## 2019-06-09 NOTE — PLAN OF CARE
Problem: Adult Inpatient Plan of Care  Goal: Plan of Care Review  Pt remained free of injuries, falls, and trauma. VSS. Losartan and Verapamil restarted.  Pt still in NSR. Pain being managed w/ PRN medication and scheduled Lidocaine patch. Pt worked w/ PT today. Planned to return back to rehab on Monday. Plan of care reviewed with pt. Pt verbalized understanding.  All questions and concerns addressed. No complaints at this time. Will continue to monitor.

## 2019-06-09 NOTE — PLAN OF CARE
Problem: Adult Inpatient Plan of Care  Goal: Plan of Care Review  Outcome: Ongoing (interventions implemented as appropriate)  Pt is AAOx 4. Denies any complaints today. Has sat up in the chair and ambulated to the bathroom a couple of times with his walker and standby assist. Tolerated well and states the pain is getting better. Pt is concerned that he is missing his intense therapy at the rehab and does not want to be behind in his recovery. Dressing is dry and intact. No redness or inflammation is noted. Will cont to monitor.

## 2019-06-09 NOTE — PROGRESS NOTES
Progress Note  Hospital Medicine    Primary Team: [facility] HOSP MED C  Admit Date: 6/7/2019   Length of Stay:  LOS: 2 days   SUBJECTIVE:   Reason for Admission:  Atrial fibrillation with RVR    HPI:  Junior Beatty is a 58 y.o. man with a history of HTN, HLD, Vitamin-D deficiency, Arthrogryposis Multiplex Congenita, and Recent R Hip Replacement (currently at [facility]) who presents to [facility] complaining of sudden onset palpitations afternoon.     Patient was recently admitted to [facility] for planned right hip replacement.  Procedure went well no complications, and he was transition to inpatient rehab.  At [facility], he had been doing well and tolerated his 1st 2 sessions today with no issues.  However just prior to his 3rd session he had acute onset palpitations and lightheadedness.  Vitals were taken at [facility] and he was found to be hypotensive and tachycardic; he was transferred to [facility] ED for further evaluation.  During these episodes of palpitations, he denies chest pain, shortness of breath, nausea, vomiting, and diaphoresis.  He denies recent worsening PEREZ, orthopnea, PND, and lower extremity edema. He denies recent illnesses or sick contacts, fevers, chills, and night sweats.  He denies recent hot or cold intolerance, excessive swelling, dysphagia, odynophagia, and globus sensation.  He has never had episodes such as this before.  He has no family history of atrial fibrillation or early CAD.  He reports that his pain has been recently well controlled with Tylenol and p.r.n. oxycodone.  He does note that he was started on Robaxin for multi modality pain control yesterday evening.  Of note, patient has a history of Arthrogryposis Multiplex Congenita, and has had several orthopedic surgeries to improve mobility in his joint.     In the ED, EKG showed AFib with RVR with initial heart rate of 140 and blood pressure as low as 87/58.  He was bolused 2 L normal saline with spontaneous conversion to normal sinus rhythm, confirmed on  repeat 12 lead EKG.  He did not receive any rate or rhythm control agents.  Currently, he denies palpitations, chest pain, weakness, and lightheadedness - he reports that he feels at his baseline.  Labs notable for hemoglobin:  13.0, normal PT/INR, normal electrolytes and creatinine, largely normal LFTs aside from alk-phos:  145, troponin negative, lactate normal, UA with 2+ protein, TSH normal:  1.26.  D-dimer was elevated at 6.93, but CTA Chest showed no acute pulmonary embolism.    Interval history:    No acute events overnight.  Pt feels well today.  He believes the AF was incited by Robaxin and asks that it be listed as an allergy.    Review of Systems:  Constitutional: no fever or chills  Respiratory: no cough or shortness of breath  Cardiovascular: no chest pain or palpitations  Gastrointestinal: no nausea or vomiting, no abdominal pain or change in bowel habits  Musculoskeletal: no arthralgias or myalgias     OBJECTIVE:     Temp:  [97.8 °F (36.6 °C)-99.4 °F (37.4 °C)]   Pulse:  [56-82]   Resp:  [16-18]   BP: (125-153)/(76-89)   SpO2:  [91 %-94 %]  Body mass index is 35.22 kg/m².  Intake/Outake:  This Shift:  I/O this shift:  In: 720 [P.O.:720]  Out: 400 [Urine:400]    Net I/O past 24h:     Intake/Output Summary (Last 24 hours) at 6/9/2019 1748  Last data filed at 6/9/2019 1300  Gross per 24 hour   Intake 840 ml   Output 2100 ml   Net -1260 ml             Physical Exam:  Gen- well-developed, well-nourished, NAD  CVS- S1 and S2 present, RRR, no murmurs  Resp- CTA b/l, no work of breathing  Abd- BS+, soft, NT, ND  Ext- no clubbing or cyanosis, RLE >LLE    Laboratory:  CBC/Anemia Labs: Coags:    Recent Labs   Lab 06/07/19  1525 06/08/19  0454 06/09/19  0351   WBC 9.30 5.61 6.03   HGB 13.0* 11.1* 12.0*   HCT 40.0 34.2* 38.3*    254 235   MCV 89 88 90   RDW 14.1 14.4 14.3    Recent Labs   Lab 06/07/19  1526   INR 0.9        Chemistries:   Recent Labs   Lab 06/07/19  1525 06/08/19  0454 06/09/19  0351   NA  138 143 141   K 3.8 3.6 3.9    113* 110   CO2 18* 22* 23   BUN 19 14 12   CREATININE 0.8 0.6 0.6   CALCIUM 9.1 8.8 9.0   PROT 6.5  --   --    BILITOT 0.4  --   --    ALKPHOS 143*  --   --    ALT 33  --   --    AST 30  --   --    MG 2.1 1.9 2.0   PHOS  --  3.4 3.3        Cardiac Enzymes: Ejection Fractions:    Recent Labs     06/07/19  1525 06/07/19  2229 06/08/19  0454   TROPONINI 0.008 0.125* 0.102*    No results found for: EF      Medications:  Scheduled Meds:   aspirin  81 mg Oral Daily    atorvastatin  20 mg Oral Daily    enoxaparin  40 mg Subcutaneous Daily    lidocaine  1 patch Transdermal Q24H    losartan  100 mg Oral Daily    verapamil  180 mg Oral Daily    vitamin D  1,000 Units Oral Daily                             Continuous Infusions:  PRN Meds:.acetaminophen, ondansetron, oxyCODONE, oxyCODONE, promethazine, sodium chloride 0.9%     ASSESSMENT/PLAN:     Active Hospital Problems    Diagnosis  POA    *Atrial fibrillation with RVR [I48.91]  Yes    Hypotension [I95.9]  Yes      Resolved Hospital Problems   No resolved problems to display.     Newly Diagnosed Atrial Fibrillation with RVR  · Admit to [facility] for further evaluation and management.  Based on current available data, CHADSVASc: 1 (point for HTN) and HASBLED: 2.  In the ED, EKG showed AFib with RVR with initial heart rate of 140 and blood pressure as low as 87/58.  He was bolused 2 L normal saline with spontaneous conversion to normal sinus rhythm, confirmed on repeat 12 lead EKG.   · Continues telemetry while inpatient  · Will hold on anticoagulation for now given CHADSVASc: 1 and spontaneous conversion to NSR with IV fluid alone  · Will hold on rate and rhythm control agents for now as patient spontaneously converted to NSR  · Follow-up TTE  readjust CHADSVASc based on results if necessary  · Follow-up repeat troponin- downtrending  · Replete potassium greater than 4.0 and magnesium greater than 2.0     HTN  · Resumed home losartan  100 mg daily and verapamil 180 mg daily   · Goal BP less than 130/80     HLD  · Continue aspirin 81 mg daily and atorvastatin 20 mg daily     Vitamin-D deficiency  · Continue vitamin-D 1000 units daily      Arthrogryposis Multiplex Congenita  Recent R Hip Replacement  · PT/OT consulted.  PMR consulted.  · Pain control with Tylenol 650 mg q.4 hours for mild pain, oxycodone 5 mg p.o. q.6 p.r.n. moderate pain and oxycodone 10 mg p.o. q.6 p.r.n. severe breakthrough pain  · Continue lidocaine 5% patch daily  · Holding Robaxin which was recently started at [facility]   · Fall precautions  · Contacted Orthopedics, bringing hip abductor to bedside    DVT ppx- Lovenox  CODE Status- FULL    Dispo- return to rehab Monday    Cecilia Reed MD  Hospital Medicine Staff

## 2019-06-09 NOTE — PT/OT/SLP EVAL
Occupational Therapy   Evaluation    Name: Junior Beatty  MRN: [id number]  Admitting Diagnosis:  Atrial fibrillation with RVR      Recommendations:     Discharge Recommendations: rehabilitation facility  Discharge Equipment Recommendations:  none  Barriers to discharge:  Decreased caregiver support    Assessment:     Junior Beatty is a 58 y.o. male with a medical diagnosis of Atrial fibrillation with RVR.  He presents with decreased independence with ADL's & mobility. Performance deficits affecting function: weakness, impaired endurance, impaired self care skills, impaired functional mobilty, impaired balance.      Rehab Prognosis: Good; patient would benefit from acute skilled OT services to address these deficits and reach maximum level of function.       Plan:     Patient to be seen 3 x/week to address the above listed problems via self-care/home management, therapeutic activities, therapeutic exercises  · Plan of Care Expires: 07/09/19  · Plan of Care Reviewed with: patient    Subjective     Chief Complaint: none  Patient/Family Comments/goals: to get back to rehab    Occupational Profile:  Living Environment & PLOF: Pt resides alone in 1 story house with 8 steps 1 rail.  Pt was modified independent with ADL's & ambulation PTA however at rehab was using RW for ambulation.  Pt is an active .  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  Pt also enjoys [employer].  Equipment Used at Home:  (RW, SC, hip kit, 3 in 1 commode, shower chair, grab bars in walk-in shower)  Assistance upon Discharge: pt lives alone    Pain/Comfort:  · Pain Rating 1: 0/10  · Pain Rating Post-Intervention 1: 0/10    Patients cultural, spiritual, Yarsani conflicts given the current situation: no    Objective:     Communicated with: RN prior to session.  Patient found supine with telemetry upon OT entry to room.    General Precautions: Standard, fall   Orthopedic Precautions:RLE posterior precautions    Braces:       Occupational Performance:    Bed Mobility:    · Patient completed Supine to Sit with minimal assistance    Functional Mobility/Transfers:  · Patient completed Sit <> Stand Transfer with stand by assistance  with  rolling walker   · Functional Mobility: SBA using RW to bathroom & back    Activities of Daily Living:  · Upper Body Dressing: stand by assistance donning pull over shirt seated EOB  · Lower Body Dressing: stand by assistance doffing & donning socks using AE  · Toileting: stand by assistance toileting while standing at toilet    Cognitive/Visual Perceptual:  Cognitive/Psychosocial Skills:     -       Oriented to: Person, Place, Time and Situation   -       Follows Commands/attention:Follows multistep  commands  -       Communication: clear/fluent  -       Memory: No Deficits noted  -       Safety awareness/insight to disability: intact   -       Mood/Affect/Coping skills/emotional control: Appropriate to situation    Physical Exam:  Sensation:    -       Intact  Dominant hand:    -       ambidextrous  Upper Extremity Range of Motion:     -       Right Upper Extremity: shoulder flexion 90*, elbow 90* to full extension only, wrist extension neutral & full extension however no flexion, impaired finger mobility  -       Left Upper Extremity: shoulder flexion 90*, elbow flexion 90* to full flexion (no further extension), wrist flexion from neutral to full flexion no extension, impaired finger mobility   Strength: moderate BUE    AMPAC 6 Click ADL:  AMPAC Total Score: 17    Treatment & Education:  Provided education regarding role of OT, POC, & discharge recommendations with pt verbalizing understanding.  Pt had no further questions & when asked whether there were any concerns pt reported none.      Education:    Patient left up in chair with all lines intact, call button in reach, RN notified and white board updated.    GOALS:   Multidisciplinary Problems     Occupational Therapy Goals         Problem: Occupational Therapy Goal    Goal Priority Disciplines Outcome Interventions   Occupational Therapy Goal     OT, PT/OT Ongoing (interventions implemented as appropriate)    Description:  Goals to be met by: 6/19     Patient will increase functional independence with ADLs by performing:    UE Dressing with Modified [place].  LE Dressing with Modified [place] with AE.  Grooming while standing with Modified [place].  Toileting from toilet with Modified [place] for hygiene and clothing management.   Supine to sit with Supervision.  Stand pivot transfers with Modified [place] using RW.                      History:     History reviewed. No pertinent past medical history.    History reviewed. No pertinent surgical history.    Time Tracking:     OT Date of Treatment: 06/09/19  OT Start Time: 1259  OT Stop Time: 1331  OT Total Time (min): 32 min    Billable Minutes:Evaluation 22  Self Care/Home Management 10    HARRY Hewitt  6/9/2019

## 2019-06-10 PROBLEM — Z74.09 IMPAIRED MOBILITY AND ADLS: Status: ACTIVE | Noted: 2019-06-10

## 2019-06-10 PROBLEM — Z78.9 IMPAIRED MOBILITY AND ADLS: Status: ACTIVE | Noted: 2019-06-10

## 2019-06-10 LAB
ANION GAP SERPL CALC-SCNC: 11 MMOL/L (ref 8–16)
ASCENDING AORTA: 3.35 CM
BASOPHILS # BLD AUTO: 0.03 K/UL (ref 0–0.2)
BASOPHILS NFR BLD: 0.4 % (ref 0–1.9)
BSA FOR ECHO PROCEDURE: 2.09 M2
BUN SERPL-MCNC: 17 MG/DL (ref 6–20)
CALCIUM SERPL-MCNC: 9.2 MG/DL (ref 8.7–10.5)
CHLORIDE SERPL-SCNC: 109 MMOL/L (ref 95–110)
CO2 SERPL-SCNC: 20 MMOL/L (ref 23–29)
CREAT SERPL-MCNC: 0.7 MG/DL (ref 0.5–1.4)
CV ECHO LV RWT: 0.36 CM
DIFFERENTIAL METHOD: ABNORMAL
DOP CALC LVOT AREA: 3.53 CM2
DOP CALC LVOT DIAMETER: 2.12 CM
DOP CALC LVOT PEAK VEL: 1.05 M/S
DOP CALC LVOT STROKE VOLUME: 68.13 CM3
DOP CALCLVOT PEAK VEL VTI: 19.31 CM
E WAVE DECELERATION TIME: 194.57 MSEC
E/A RATIO: 0.94
E/E' RATIO: 8.57
ECHO LV POSTERIOR WALL: 0.77 CM (ref 0.6–1.1)
EOSINOPHIL # BLD AUTO: 0.3 K/UL (ref 0–0.5)
EOSINOPHIL NFR BLD: 3.8 % (ref 0–8)
ERYTHROCYTE [DISTWIDTH] IN BLOOD BY AUTOMATED COUNT: 14.5 % (ref 11.5–14.5)
EST. GFR  (AFRICAN AMERICAN): >60 ML/MIN/1.73 M^2
EST. GFR  (NON AFRICAN AMERICAN): >60 ML/MIN/1.73 M^2
FRACTIONAL SHORTENING: 35 % (ref 28–44)
GLUCOSE SERPL-MCNC: 83 MG/DL (ref 70–110)
HCT VFR BLD AUTO: 38.9 % (ref 40–54)
HGB BLD-MCNC: 12.5 G/DL (ref 14–18)
IMM GRANULOCYTES # BLD AUTO: 0.04 K/UL (ref 0–0.04)
IMM GRANULOCYTES NFR BLD AUTO: 0.6 % (ref 0–0.5)
INTERVENTRICULAR SEPTUM: 0.99 CM (ref 0.6–1.1)
IVRT: 0.12 MSEC
LA MAJOR: 6.17 CM
LA WIDTH: 3.46 CM
LEFT ATRIUM SIZE: 4.47 CM
LEFT INTERNAL DIMENSION IN SYSTOLE: 2.79 CM (ref 2.1–4)
LEFT VENTRICLE DIASTOLIC VOLUME INDEX: 41.29 ML/M2
LEFT VENTRICLE DIASTOLIC VOLUME: 83.55 ML
LEFT VENTRICLE MASS INDEX: 59.3 G/M2
LEFT VENTRICLE SYSTOLIC VOLUME INDEX: 14.5 ML/M2
LEFT VENTRICLE SYSTOLIC VOLUME: 29.27 ML
LEFT VENTRICULAR INTERNAL DIMENSION IN DIASTOLE: 4.31 CM (ref 3.5–6)
LEFT VENTRICULAR MASS: 120.06 G
LV LATERAL E/E' RATIO: 6.67
LV SEPTAL E/E' RATIO: 12
LYMPHOCYTES # BLD AUTO: 2 K/UL (ref 1–4.8)
LYMPHOCYTES NFR BLD: 29.3 % (ref 18–48)
MAGNESIUM SERPL-MCNC: 2 MG/DL (ref 1.6–2.6)
MCH RBC QN AUTO: 29.1 PG (ref 27–31)
MCHC RBC AUTO-ENTMCNC: 32.1 G/DL (ref 32–36)
MCV RBC AUTO: 91 FL (ref 82–98)
MONOCYTES # BLD AUTO: 0.6 K/UL (ref 0.3–1)
MONOCYTES NFR BLD: 8.7 % (ref 4–15)
MV PEAK A VEL: 0.64 M/S
MV PEAK E VEL: 0.6 M/S
NEUTROPHILS # BLD AUTO: 3.9 K/UL (ref 1.8–7.7)
NEUTROPHILS NFR BLD: 57.2 % (ref 38–73)
NRBC BLD-RTO: 0 /100 WBC
PHOSPHATE SERPL-MCNC: 3.8 MG/DL (ref 2.7–4.5)
PISA TR MAX VEL: 1.97 M/S
PLATELET # BLD AUTO: 264 K/UL (ref 150–350)
PMV BLD AUTO: 8.7 FL (ref 9.2–12.9)
POTASSIUM SERPL-SCNC: 4 MMOL/L (ref 3.5–5.1)
PULM VEIN S/D RATIO: 1.52
PV PEAK D VEL: 0.29 M/S
PV PEAK S VEL: 0.44 M/S
RA MAJOR: 5.27 CM
RA PRESSURE: 3 MMHG
RA WIDTH: 2.82 CM
RBC # BLD AUTO: 4.29 M/UL (ref 4.6–6.2)
RIGHT VENTRICULAR END-DIASTOLIC DIMENSION: 2.55 CM
RV TISSUE DOPPLER FREE WALL SYSTOLIC VELOCITY 1 (APICAL 4 CHAMBER VIEW): 10.99 M/S
SINUS: 3.89 CM
SODIUM SERPL-SCNC: 140 MMOL/L (ref 136–145)
STJ: 3.26 CM
TDI LATERAL: 0.09
TDI SEPTAL: 0.05
TDI: 0.07
TR MAX PG: 15.52 MMHG
TRICUSPID ANNULAR PLANE SYSTOLIC EXCURSION: 1.47 CM
TV REST PULMONARY ARTERY PRESSURE: 19 MMHG
WBC # BLD AUTO: 6.87 K/UL (ref 3.9–12.7)

## 2019-06-10 PROCEDURE — 25000003 PHARM REV CODE 250: Performed by: HOSPITALIST

## 2019-06-10 PROCEDURE — 83735 ASSAY OF MAGNESIUM: CPT

## 2019-06-10 PROCEDURE — 20600001 HC STEP DOWN PRIVATE ROOM

## 2019-06-10 PROCEDURE — 80048 BASIC METABOLIC PNL TOTAL CA: CPT

## 2019-06-10 PROCEDURE — 84100 ASSAY OF PHOSPHORUS: CPT

## 2019-06-10 PROCEDURE — 36415 COLL VENOUS BLD VENIPUNCTURE: CPT

## 2019-06-10 PROCEDURE — 99232 PR SUBSEQUENT HOSPITAL CARE,LEVL II: ICD-10-PCS | Mod: ,,, | Performed by: HOSPITALIST

## 2019-06-10 PROCEDURE — 99222 1ST HOSP IP/OBS MODERATE 55: CPT | Mod: ,,, | Performed by: NURSE PRACTITIONER

## 2019-06-10 PROCEDURE — 99232 SBSQ HOSP IP/OBS MODERATE 35: CPT | Mod: ,,, | Performed by: HOSPITALIST

## 2019-06-10 PROCEDURE — 63600175 PHARM REV CODE 636 W HCPCS: Performed by: HOSPITALIST

## 2019-06-10 PROCEDURE — 85025 COMPLETE CBC W/AUTO DIFF WBC: CPT

## 2019-06-10 PROCEDURE — 99222 PR INITIAL HOSPITAL CARE,LEVL II: ICD-10-PCS | Mod: ,,, | Performed by: NURSE PRACTITIONER

## 2019-06-10 RX ORDER — LIDOCAINE 50 MG/G
1 PATCH TOPICAL
Status: DISCONTINUED | OUTPATIENT
Start: 2019-06-11 | End: 2019-06-11 | Stop reason: HOSPADM

## 2019-06-10 RX ADMIN — ENOXAPARIN SODIUM 40 MG: 100 INJECTION SUBCUTANEOUS at 04:06

## 2019-06-10 RX ADMIN — ATORVASTATIN CALCIUM 20 MG: 20 TABLET, FILM COATED ORAL at 09:06

## 2019-06-10 RX ADMIN — VERAPAMIL HYDROCHLORIDE 180 MG: 180 TABLET, FILM COATED, EXTENDED RELEASE ORAL at 09:06

## 2019-06-10 RX ADMIN — LOSARTAN POTASSIUM 100 MG: 50 TABLET, FILM COATED ORAL at 09:06

## 2019-06-10 RX ADMIN — LIDOCAINE 1 PATCH: 50 PATCH TOPICAL at 12:06

## 2019-06-10 RX ADMIN — VITAMIN D, TAB 1000IU (100/BT) 1000 UNITS: 25 TAB at 09:06

## 2019-06-10 RX ADMIN — OXYCODONE HYDROCHLORIDE 10 MG: 10 TABLET ORAL at 05:06

## 2019-06-10 RX ADMIN — ASPIRIN 81 MG: 81 TABLET, COATED ORAL at 09:06

## 2019-06-10 NOTE — HOSPITAL COURSE
6/8/19:  Evaluated by PT.  Bed mobility Tiny.  Sit to stand SBA with RW.  Ambulated 30 ft SBA with RW.    6/9/19:  Evaluated by OT.  Bed mobility Tiny.  Sit to stand and functional mobility SBA with RW.  UBD, LBD, and toileting SBA.  6/10/19:  No PT or OT.

## 2019-06-10 NOTE — PLAN OF CARE
Referral sent to [facility] via [facility].  Pt would [facility] [place] [facility] [place].  Case is still under review.  Will follow.     06/10/19 3531   Post-Acute Status   Post-Acute Authorization Placement   Post-Acute Placement Status Referrals Sent

## 2019-06-10 NOTE — PLAN OF CARE
Problem: Adult Inpatient Plan of Care  Goal: Plan of Care Review  Pt remained free of injuries, falls, and trauma. VSS. Pain being managed w/ PRN medication and scheduled Lidocaine patch. R hip dressing changed tonight.   Planned to return back to rehab on Monday. Plan of care reviewed with pt. Pt verbalized understanding.  All questions and concerns addressed. No complaints at this time. Will continue to monitor.

## 2019-06-10 NOTE — PLAN OF CARE
CM met with pt. and completed assessment. Pt. was transferred from [facility] re[facility] with new onset of A-fib. S/P hip replacement 2 weeks ago. Pt.lives alone with family support. I anxious about completing his rehab. D/c plan; once medically stable pt. will return to [facility].    Payor: [employer] / Plan: [employer] / Product Type: Commercial /      No primary care provider on file.     No Pharmacies Listed        06/10/19 0959   Discharge Assessment   Assessment Type Discharge Planning Assessment   Confirmed/corrected address and phone number on facesheet? Yes   Assessment information obtained from? Patient   Expected Length of Stay (days) 2   Communicated expected length of stay with patient/caregiver yes   Prior to hospitilization cognitive status: Alert/Oriented   Prior to hospitalization functional status: Independent   Current cognitive status: Alert/Oriented   Current Functional Status: Needs Assistance   Facility Arrived From: [facility]   Lives With alone   Able to Return to Prior Arrangements yes   Is patient able to care for self after discharge? Yes   Who are your caregiver(s) and their phone number(s)? Mother Anahi Corrales [phone]   Patient's perception of discharge disposition rehab facility  (back to rehab)   Readmission Within the Last 30 Days other (see comments)  (s/p hip replacement)   Patient currently receives any other outside agency services? No   Equipment Currently Used at Home none  (has equipment he does not use)   Do you have any problems affording any of your prescribed medications? No   Is the patient taking medications as prescribed? yes   Does the patient have transportation home? Yes   Transportation Anticipated family or friend will provide   DME Needed Upon Discharge  none

## 2019-06-10 NOTE — PLAN OF CARE
Problem: Adult Inpatient Plan of Care  Goal: Plan of Care Review  Outcome: Revised  Pt free of falls/trauma/injuries.  Denies c/o SOB or CP. Discomfort managed with PO and topical anlgesics. R hip replacement on 5/27; incision remains covered with dressing.  Pt had ARIN done today; awaiting results.  Pt tolerating plan of care. Will continue to monitor.

## 2019-06-10 NOTE — SUBJECTIVE & OBJECTIVE
History reviewed. No pertinent past medical history.  History reviewed. No pertinent surgical history.  Review of patient's allergies indicates:   Allergen Reactions    Robaxin [methocarbamol]      Pt believes this incited A.fib    Tramadol      Other reaction(s): Dizziness       Scheduled Medications:    aspirin  81 mg Oral Daily    atorvastatin  20 mg Oral Daily    enoxaparin  40 mg Subcutaneous Daily    lidocaine  1 patch Transdermal Q24H    losartan  100 mg Oral Daily    verapamil  180 mg Oral Daily    vitamin D  1,000 Units Oral Daily       PRN Medications: acetaminophen, ondansetron, oxyCODONE, oxyCODONE, promethazine, sodium chloride 0.9%    Family History     None        Tobacco Use    Smoking status: Unknown If Ever Smoked    Smokeless tobacco: Never Used   Substance and Sexual Activity    Alcohol use: Not on file    Drug use: Not on file    Sexual activity: Not on file     Review of Systems   Constitutional: Positive for activity change. Negative for chills, fatigue and fever.   HENT: Negative for drooling, hearing loss, trouble swallowing and voice change.    Eyes: Negative for pain and visual disturbance.   Respiratory: Negative for cough, shortness of breath and wheezing.    Cardiovascular: Negative for chest pain and palpitations.   Gastrointestinal: Negative for abdominal pain, nausea and vomiting.   Genitourinary: Negative for difficulty urinating and flank pain.   Musculoskeletal: Positive for arthralgias, gait problem and myalgias.   Skin: Negative for pallor and rash.   Neurological: Negative for dizziness, weakness, numbness and headaches.   Psychiatric/Behavioral: Negative for agitation and hallucinations. The patient is not nervous/anxious.      Objective:     Vital Signs (Most Recent):  Temp: 98 °F (36.7 °C) (06/10/19 0745)  Pulse: 78 (06/10/19 1000)  Resp: 16 (06/10/19 0745)  BP: (!) 161/87 (06/10/19 0745)  SpO2: 95 % (06/10/19 0745)    Vital Signs (24h Range):  Temp:  [97.1 °F  (36.2 °C)-98.9 °F (37.2 °C)] 98 °F (36.7 °C)  Pulse:  [50-84] 78  Resp:  [16-18] 16  SpO2:  [91 %-95 %] 95 %  BP: (140-163)/(79-94) 161/87     Body mass index is 35.22 kg/m².    Physical Exam   Constitutional: He is oriented to person, place, and time. He appears well-developed and well-nourished. No distress.   HENT:   Head: Normocephalic and atraumatic.   Right Ear: External ear normal.   Left Ear: External ear normal.   Nose: Nose normal.   Eyes: Right eye exhibits no discharge. Left eye exhibits no discharge. No scleral icterus.   Neck: Normal range of motion.   Cardiovascular: Normal rate and intact distal pulses.   Pulmonary/Chest: Effort normal. No respiratory distress. He has no wheezes.   Abdominal: Soft. He exhibits no distension. There is no tenderness.   Musculoskeletal: Normal range of motion. He exhibits no edema or tenderness.   BUE deformity and decreased ROM  RLE decrease strength and ROM, pain limited   Neurological: He is alert and oriented to person, place, and time. He exhibits normal muscle tone.   Skin: Skin is warm and dry. No rash noted.   Psychiatric: He has a normal mood and affect. His behavior is normal. Thought content normal.   Vitals reviewed.    NEUROLOGICAL EXAMINATION:     MENTAL STATUS   Oriented to person, place, and time.       Diagnostic Results:   Labs: Reviewed  X-Ray: Reviewed  CT: Reviewed

## 2019-06-10 NOTE — PROGRESS NOTES
Progress Note  Hospital Medicine    Primary Team: [facility] HOSP MED C  Admit Date: 6/7/2019   Length of Stay:  LOS: 3 days   SUBJECTIVE:   Reason for Admission:  Atrial fibrillation with RVR    HPI:  Junior Beatty is a 58 y.o. man with a history of HTN, HLD, Vitamin-D deficiency, Arthrogryposis Multiplex Congenita, and Recent R Hip Replacement (currently at [facility]) who presents to [facility] complaining of sudden onset palpitations afternoon.     Patient was recently admitted to [facility] for planned right hip replacement.  Procedure went well no complications, and he was transition to inpatient rehab.  At [facility], he had been doing well and tolerated his 1st 2 sessions today with no issues.  However just prior to his 3rd session he had acute onset palpitations and lightheadedness.  Vitals were taken at [facility] and he was found to be hypotensive and tachycardic; he was transferred to [facility] ED for further evaluation.  During these episodes of palpitations, he denies chest pain, shortness of breath, nausea, vomiting, and diaphoresis.  He denies recent worsening PEREZ, orthopnea, PND, and lower extremity edema. He denies recent illnesses or sick contacts, fevers, chills, and night sweats.  He denies recent hot or cold intolerance, excessive swelling, dysphagia, odynophagia, and globus sensation.  He has never had episodes such as this before.  He has no family history of atrial fibrillation or early CAD.  He reports that his pain has been recently well controlled with Tylenol and p.r.n. oxycodone.  He does note that he was started on Robaxin for multi modality pain control yesterday evening.  Of note, patient has a history of Arthrogryposis Multiplex Congenita, and has had several orthopedic surgeries to improve mobility in his joint.     In the ED, EKG showed AFib with RVR with initial heart rate of 140 and blood pressure as low as 87/58.  He was bolused 2 L normal saline with spontaneous conversion to normal sinus rhythm, confirmed on  repeat 12 lead EKG.  He did not receive any rate or rhythm control agents.  Currently, he denies palpitations, chest pain, weakness, and lightheadedness - he reports that he feels at his baseline.  Labs notable for hemoglobin:  13.0, normal PT/INR, normal electrolytes and creatinine, largely normal LFTs aside from alk-phos:  145, troponin negative, lactate normal, UA with 2+ protein, TSH normal:  1.26.  D-dimer was elevated at 6.93, but CTA Chest showed no acute pulmonary embolism.    Interval history:    No acute events overnight.  Pt still feels well but is frustrated with delay in ECHO and in becoming weaker while awaiting transfer back to rehab.    Review of Systems:  Constitutional: no fever or chills  Respiratory: no cough or shortness of breath  Cardiovascular: no chest pain or palpitations  Gastrointestinal: no nausea or vomiting, no abdominal pain or change in bowel habits  Musculoskeletal: no arthralgias or myalgias     OBJECTIVE:     Temp:  [97.1 °F (36.2 °C)-99.1 °F (37.3 °C)]   Pulse:  [50-99]   Resp:  [16-18]   BP: (122-163)/(79-97)   SpO2:  [91 %-95 %]  Body mass index is 35.11 kg/m².  Intake/Outake:  This Shift:  I/O this shift:  In: 720 [P.O.:720]  Out: 1425 [Urine:1425]    Net I/O past 24h:     Intake/Output Summary (Last 24 hours) at 6/10/2019 1813  Last data filed at 6/10/2019 1700  Gross per 24 hour   Intake 960 ml   Output 1925 ml   Net -965 ml             Physical Exam:  Gen- well-developed, well-nourished, NAD  CVS- S1 and S2 present, RRR, no murmurs  Resp- CTA b/l, no work of breathing  Abd- BS+, soft, NT, ND  Ext- no clubbing or cyanosis, RLE >LLE    Laboratory:  CBC/Anemia Labs: Coags:    Recent Labs   Lab 06/08/19  0454 06/09/19  0351 06/10/19  0349   WBC 5.61 6.03 6.87   HGB 11.1* 12.0* 12.5*   HCT 34.2* 38.3* 38.9*    235 264   MCV 88 90 91   RDW 14.4 14.3 14.5    Recent Labs   Lab 06/07/19  1526   INR 0.9        Chemistries:   Recent Labs   Lab 06/07/19  1525 06/08/19  0453  06/09/19  0351 06/10/19  0349    143 141 140   K 3.8 3.6 3.9 4.0    113* 110 109   CO2 18* 22* 23 20*   BUN 19 14 12 17   CREATININE 0.8 0.6 0.6 0.7   CALCIUM 9.1 8.8 9.0 9.2   PROT 6.5  --   --   --    BILITOT 0.4  --   --   --    ALKPHOS 143*  --   --   --    ALT 33  --   --   --    AST 30  --   --   --    MG 2.1 1.9 2.0 2.0   PHOS  --  3.4 3.3 3.8        Cardiac Enzymes: Ejection Fractions:    Recent Labs     06/07/19  2229 06/08/19  0454   TROPONINI 0.125* 0.102*    No results found for: EF      Medications:  Scheduled Meds:   aspirin  81 mg Oral Daily    atorvastatin  20 mg Oral Daily    enoxaparin  40 mg Subcutaneous Daily    lidocaine  1 patch Transdermal Q24H    losartan  100 mg Oral Daily    verapamil  180 mg Oral Daily    vitamin D  1,000 Units Oral Daily                             Continuous Infusions:  PRN Meds:.acetaminophen, ondansetron, oxyCODONE, oxyCODONE, promethazine, sodium chloride 0.9%     ASSESSMENT/PLAN:     Active Hospital Problems    Diagnosis  POA    *Atrial fibrillation with RVR [I48.91]  Yes    Impaired mobility and ADLs [Z74.09]  Yes    Hypotension [I95.9]  Yes      Resolved Hospital Problems   No resolved problems to display.     Newly Diagnosed Atrial Fibrillation with RVR  · Admit to [facility] for further evaluation and management.  Based on current available data, CHADSVASc: 1 (point for HTN) and HASBLED: 2.  In the ED, EKG showed AFib with RVR with initial heart rate of 140 and blood pressure as low as 87/58.  He was bolused 2 L normal saline with spontaneous conversion to normal sinus rhythm, confirmed on repeat 12 lead EKG.   · Continues telemetry while inpatient  · Will hold on anticoagulation for now given CHADSVASc: 1 and spontaneous conversion to NSR with IV fluid alone  · Will hold on rate and rhythm control agents for now as patient spontaneously converted to NSR  · TTE reviewed, largely normal   · Replete potassium greater than 4.0 and magnesium greater  than 2.0     HTN  · Resumed home losartan 100 mg daily and verapamil 180 mg daily   · Goal BP less than 130/80     HLD  · Continue aspirin 81 mg daily and atorvastatin 20 mg daily     Vitamin-D deficiency  · Continue vitamin-D 1000 units daily      Arthrogryposis Multiplex Congenita  Recent R Hip Replacement  · PT/OT consulted.  PMR consulted.  · Pain control with Tylenol 650 mg q.4 hours for mild pain, oxycodone 5 mg p.o. q.6 p.r.n. moderate pain and oxycodone 10 mg p.o. q.6 p.r.n. severe breakthrough pain  · Continue lidocaine 5% patch daily  · Holding Robaxin which was recently started at [facility]   · Fall precautions  · Contacted Orthopedics, bringing hip abductor to bedside    DVT ppx- Lovenox  CODE Status- FULL    Dispo- return to rehab pending insurance approval    Cecilia Reed MD  Hospital Medicine Staff

## 2019-06-10 NOTE — PROGRESS NOTES
Pt escorted to echo lab via stretcher for ARIN.   Pre-procedure orders implemented as ordered.  Pt showing no S/S of distress; AAOx4.  Pt transported with telemetry.  Awaiting return.    1400  Pt from echo via stretcher with escort.  Pt in no distress, resting comfortably in bed.  Pt verbalizes understanding of plan of care.  Bed locked, in lowest position, siderails up x2.  Call bell in reach.  Pt instructed to call for assistance.  Will continue to monitor.

## 2019-06-10 NOTE — HPI
Junior Beatty is a 58-year-old female with PMHx of HTN, HLD, vitamin D deficiency, and Arthrogryposis Multiplex Congenita requiring several orthopedic surgical interventions with recent admission for R LEAH with subsequent discharge to [facility] on 5/29/19.  Patient presented to [facility] from rehab on 6/7/19 for acute onset palpitations and lightheadedness with associated hypotension and tachycardia.  On arrival, found to be hypotensive with EKG showing a-fib with RVR.  CTA chest without evidence of PE.  Treated with IVF with spontaneous conversion to NSR.      Functional History: Patient lives in [place], [facility], in a single story home with 8 steps to enter and loft on 2nd floor.  Prior to recent admission, he was (I) with ADLs and mobility.  DME: RW, SC, 3-in-1 commode, shower chair, grab bars, walk-in shower.

## 2019-06-10 NOTE — ASSESSMENT & PLAN NOTE
-  EKG showed a-fib with RVR--> Treated with IVF with spontaneous conversion to NSR  -  TTE pending  -  Management per primary team

## 2019-06-10 NOTE — CONSULTS
[facility]  Physical Medicine & Rehab  Consult Note    Patient Name: Junior Beatty  MRN: [id number]  Admission Date: 6/7/2019  Hospital Length of Stay: 3 days  Attending Physician: Cecilia Reed MD     Inpatient consult to Physical Medicine & Rehabilitation  Consult performed by: Jelena Chacon NP  Consult requested by:  Cecilia Reed MD    Collaborating Physician: Evita Smith MD  Reason for Consult:  Rehab evaluation     Consults  Subjective:     Principal Problem: Atrial fibrillation with RVR    HPI: Junior Beatty is a 58-year-old female with PMHx of HTN, HLD, vitamin D deficiency, and  Arthrogryposis Multiplex Congenita requiring several orthopedic surgical interventions with recent admission for R LEAH with subsequent discharge to [facility] on 5/29/19.  Patient presented to [facility] from rehab on 6/7/19 for acute onset palpitations and lightheadedness with associated hypotension and tachycardia.  On arrival, found to be hypotensive with EKG showing a-fib with RVR.  CTA chest without evidence of PE.  Treated with IVF with spontaneous conversion to NSR.      Functional History: Patient lives in [place], [facility], in a single story home with 8 steps to enter and loft on 2nd floor.  Prior to recent admission, he was (I) with ADLs and mobility.  DME: RW, SC, 3-in-1 commode, shower chair, grab bars, walk-in shower.    Hospital Course:   6/8/19:  Evaluated by PT.  Bed mobility Tiny.  Sit to stand SBA with RW.  Ambulated 30 ft SBA with RW.    6/9/19:  Evaluated by OT.  Bed mobility Tiny.  Sit to stand and functional mobility SBA with RW.  UBD, LBD, and toileting SBA.    History reviewed. No pertinent past medical history.  History reviewed. No pertinent surgical history.  Review of patient's allergies indicates:   Allergen Reactions    Robaxin [methocarbamol]      Pt believes this incited A.fib    Tramadol      Other reaction(s): Dizziness       Scheduled Medications:    aspirin  81 mg Oral  Daily    atorvastatin  20 mg Oral Daily    enoxaparin  40 mg Subcutaneous Daily    lidocaine  1 patch Transdermal Q24H    losartan  100 mg Oral Daily    verapamil  180 mg Oral Daily    vitamin D  1,000 Units Oral Daily       PRN Medications: acetaminophen, ondansetron, oxyCODONE, oxyCODONE, promethazine, sodium chloride 0.9%    Family History     None        Tobacco Use    Smoking status: Unknown If Ever Smoked    Smokeless tobacco: Never Used   Substance and Sexual Activity    Alcohol use: Not on file    Drug use: Not on file    Sexual activity: Not on file     Review of Systems   Constitutional: Positive for activity change. Negative for chills, fatigue and fever.   HENT: Negative for drooling, hearing loss, trouble swallowing and voice change.    Eyes: Negative for pain and visual disturbance.   Respiratory: Negative for cough, shortness of breath and wheezing.    Cardiovascular: Negative for chest pain and palpitations.   Gastrointestinal: Negative for abdominal pain, nausea and vomiting.   Genitourinary: Negative for difficulty urinating and flank pain.   Musculoskeletal: Positive for arthralgias, gait problem and myalgias.   Skin: Negative for pallor and rash.   Neurological: Negative for dizziness, weakness, numbness and headaches.   Psychiatric/Behavioral: Negative for agitation and hallucinations. The patient is not nervous/anxious.      Objective:     Vital Signs (Most Recent):  Temp: 98 °F (36.7 °C) (06/10/19 0745)  Pulse: 78 (06/10/19 1000)  Resp: 16 (06/10/19 0745)  BP: (!) 161/87 (06/10/19 0745)  SpO2: 95 % (06/10/19 0745)    Vital Signs (24h Range):  Temp:  [97.1 °F (36.2 °C)-98.9 °F (37.2 °C)] 98 °F (36.7 °C)  Pulse:  [50-84] 78  Resp:  [16-18] 16  SpO2:  [91 %-95 %] 95 %  BP: (140-163)/(79-94) 161/87     Body mass index is 35.22 kg/m².    Physical Exam   Constitutional: He is oriented to person, place, and time. He appears well-developed and well-nourished. No distress.   HENT:   Head:  Normocephalic and atraumatic.   Right Ear: External ear normal.   Left Ear: External ear normal.   Nose: Nose normal.   Eyes: Right eye exhibits no discharge. Left eye exhibits no discharge. No scleral icterus.   Neck: Normal range of motion.   Cardiovascular: Normal rate and intact distal pulses.   Pulmonary/Chest: Effort normal. No respiratory distress. He has no wheezes.   Abdominal: Soft. He exhibits no distension. There is no tenderness.   Musculoskeletal: Normal range of motion. He exhibits no edema or tenderness.   BUE deformity and decreased ROM  RLE decrease strength and ROM, pain limited   Neurological: He is alert and oriented to person, place, and time. He exhibits normal muscle tone.   Skin: Skin is warm and dry. No rash noted.   Psychiatric: He has a normal mood and affect. His behavior is normal. Thought content normal.   Vitals reviewed.    Diagnostic Results:   Labs: Reviewed  X-Ray: Reviewed  CT: Reviewed    Assessment/Plan:     * Atrial fibrillation with RVR  -  EKG showed a-fib with RVR--> Treated with IVF with spontaneous conversion to NSR  -  TTE pending  -  Management per primary team    Impaired mobility and ADLs  -  S/p R LEAH with subsequent discharge to [facility] on 5/29/19  -  Transferred from  Rehab  Recommendations  -  Encourage mobility, OOB in chair, and early ambulation as appropriate  -  PT/OT evaluate and treat  -  Pain management  -  DVT prophylaxis  -  Monitor for and prevent skin breakdown and pressure ulcers  · Early mobility, repositioning/weight shifting every 20-30 minutes when sitting, turn patient every 2 hours, proper mattress/overlay and chair cushioning, pressure relief/heel protector boots    Hypotension  -  Hypotensive in ED- treated with IVF  -  Management per primary team    Transferred from [facility].  Echo pending.  Continue therapy.  Will follow and discuss with rehab team for final post-acute recommendation.     Thank you for your consult.     Jelena ZAPATA  HALI Buckner  Department of Physical Medicine & Rehab  [facility]

## 2019-06-10 NOTE — ASSESSMENT & PLAN NOTE
-  S/p R LEAH with subsequent discharge to [facility] on 5/29/19  -  Transferred from [facility] Re[facility]  Recommendations  -  Encourage mobility, OOB in chair, and early ambulation as appropriate  -  PT/OT evaluate and treat  -  Pain management  -  DVT prophylaxis  -  Monitor for and prevent skin breakdown and pressure ulcers  · Early mobility, repositioning/weight shifting every 20-30 minutes when sitting, turn patient every 2 hours, proper mattress/overlay and chair cushioning, pressure relief/heel protector boots

## 2019-06-10 NOTE — CONSULTS
Inpatient consult to Physical Medicine Rehab  Consult performed by: HALI Cervantes  Consult ordered by: Cecilia Reed MD  Reason for consult: rehab evaluation       Consult received.  Reviewed patient history and current admission.  Rehab team following.  Full consult to follow.    LUIS CARLOS Troy, BRIANP-C  Physical Medicine & Rehabilitation   06/10/2019  Spectralink: [id number]

## 2019-06-11 VITALS
BODY MASS INDEX: 35.16 KG/M2 | OXYGEN SATURATION: 91 % | RESPIRATION RATE: 20 BRPM | DIASTOLIC BLOOD PRESSURE: 94 MMHG | WEIGHT: 211 LBS | HEIGHT: 65 IN | HEART RATE: 82 BPM | SYSTOLIC BLOOD PRESSURE: 140 MMHG | TEMPERATURE: 98 F

## 2019-06-11 LAB
ANION GAP SERPL CALC-SCNC: 12 MMOL/L (ref 8–16)
ANISOCYTOSIS BLD QL SMEAR: SLIGHT
BASOPHILS # BLD AUTO: 0.02 K/UL (ref 0–0.2)
BASOPHILS NFR BLD: 0.3 % (ref 0–1.9)
BUN SERPL-MCNC: 15 MG/DL (ref 6–20)
CALCIUM SERPL-MCNC: 9.5 MG/DL (ref 8.7–10.5)
CHLORIDE SERPL-SCNC: 105 MMOL/L (ref 95–110)
CO2 SERPL-SCNC: 21 MMOL/L (ref 23–29)
CREAT SERPL-MCNC: 0.7 MG/DL (ref 0.5–1.4)
DIFFERENTIAL METHOD: ABNORMAL
EOSINOPHIL # BLD AUTO: 0.3 K/UL (ref 0–0.5)
EOSINOPHIL NFR BLD: 3.3 % (ref 0–8)
ERYTHROCYTE [DISTWIDTH] IN BLOOD BY AUTOMATED COUNT: 14.6 % (ref 11.5–14.5)
EST. GFR  (AFRICAN AMERICAN): >60 ML/MIN/1.73 M^2
EST. GFR  (NON AFRICAN AMERICAN): >60 ML/MIN/1.73 M^2
GLUCOSE SERPL-MCNC: 86 MG/DL (ref 70–110)
HCT VFR BLD AUTO: 41.9 % (ref 40–54)
HGB BLD-MCNC: 13.5 G/DL (ref 14–18)
IMM GRANULOCYTES # BLD AUTO: 0.04 K/UL (ref 0–0.04)
IMM GRANULOCYTES NFR BLD AUTO: 0.5 % (ref 0–0.5)
LYMPHOCYTES # BLD AUTO: 1.5 K/UL (ref 1–4.8)
LYMPHOCYTES NFR BLD: 19.4 % (ref 18–48)
MAGNESIUM SERPL-MCNC: 2.1 MG/DL (ref 1.6–2.6)
MCH RBC QN AUTO: 28.5 PG (ref 27–31)
MCHC RBC AUTO-ENTMCNC: 32.2 G/DL (ref 32–36)
MCV RBC AUTO: 89 FL (ref 82–98)
MONOCYTES # BLD AUTO: 0.7 K/UL (ref 0.3–1)
MONOCYTES NFR BLD: 8.6 % (ref 4–15)
NEUTROPHILS # BLD AUTO: 5.4 K/UL (ref 1.8–7.7)
NEUTROPHILS NFR BLD: 67.9 % (ref 38–73)
NRBC BLD-RTO: 0 /100 WBC
PHOSPHATE SERPL-MCNC: 3.4 MG/DL (ref 2.7–4.5)
PLATELET # BLD AUTO: 273 K/UL (ref 150–350)
PLATELET BLD QL SMEAR: ABNORMAL
PMV BLD AUTO: 9 FL (ref 9.2–12.9)
POLYCHROMASIA BLD QL SMEAR: ABNORMAL
POTASSIUM SERPL-SCNC: 3.7 MMOL/L (ref 3.5–5.1)
RBC # BLD AUTO: 4.73 M/UL (ref 4.6–6.2)
SODIUM SERPL-SCNC: 138 MMOL/L (ref 136–145)
WBC # BLD AUTO: 7.95 K/UL (ref 3.9–12.7)

## 2019-06-11 PROCEDURE — 83735 ASSAY OF MAGNESIUM: CPT

## 2019-06-11 PROCEDURE — 36415 COLL VENOUS BLD VENIPUNCTURE: CPT

## 2019-06-11 PROCEDURE — 84100 ASSAY OF PHOSPHORUS: CPT

## 2019-06-11 PROCEDURE — 25000003 PHARM REV CODE 250: Performed by: HOSPITALIST

## 2019-06-11 PROCEDURE — 99239 HOSP IP/OBS DSCHRG MGMT >30: CPT | Mod: ,,, | Performed by: HOSPITALIST

## 2019-06-11 PROCEDURE — 99239 PR HOSPITAL DISCHARGE DAY,>30 MIN: ICD-10-PCS | Mod: ,,, | Performed by: HOSPITALIST

## 2019-06-11 PROCEDURE — 80048 BASIC METABOLIC PNL TOTAL CA: CPT

## 2019-06-11 PROCEDURE — 99232 PR SUBSEQUENT HOSPITAL CARE,LEVL II: ICD-10-PCS | Mod: ,,, | Performed by: NURSE PRACTITIONER

## 2019-06-11 PROCEDURE — 97530 THERAPEUTIC ACTIVITIES: CPT

## 2019-06-11 PROCEDURE — 85025 COMPLETE CBC W/AUTO DIFF WBC: CPT

## 2019-06-11 PROCEDURE — 99232 SBSQ HOSP IP/OBS MODERATE 35: CPT | Mod: ,,, | Performed by: NURSE PRACTITIONER

## 2019-06-11 RX ORDER — ENOXAPARIN SODIUM 100 MG/ML
40 INJECTION SUBCUTANEOUS DAILY
Start: 2019-06-11 | End: 2019-06-27

## 2019-06-11 RX ORDER — LOSARTAN POTASSIUM 100 MG/1
100 TABLET ORAL DAILY
Qty: 90 TABLET | Refills: 3
Start: 2019-06-12 | End: 2023-05-09 | Stop reason: SDUPTHER

## 2019-06-11 RX ORDER — CHOLECALCIFEROL (VITAMIN D3) 25 MCG
1000 TABLET ORAL DAILY
Start: 2019-06-12 | End: 2019-06-27

## 2019-06-11 RX ORDER — ACETAMINOPHEN 325 MG/1
650 TABLET ORAL EVERY 4 HOURS PRN
Refills: 0
Start: 2019-06-11 | End: 2019-06-27

## 2019-06-11 RX ORDER — VERAPAMIL HYDROCHLORIDE 180 MG/1
180 TABLET, FILM COATED, EXTENDED RELEASE ORAL DAILY
Qty: 30 TABLET | Refills: 11
Start: 2019-06-12 | End: 2023-05-09 | Stop reason: SDUPTHER

## 2019-06-11 RX ORDER — ASPIRIN 81 MG/1
81 TABLET ORAL DAILY
Refills: 0
Start: 2019-06-12 | End: 2019-06-27

## 2019-06-11 RX ORDER — PROMETHAZINE HYDROCHLORIDE 25 MG/1
25 TABLET ORAL EVERY 6 HOURS PRN
Start: 2019-06-11 | End: 2019-06-27

## 2019-06-11 RX ORDER — OXYCODONE HYDROCHLORIDE 5 MG/1
5 TABLET ORAL EVERY 6 HOURS PRN
Refills: 0
Start: 2019-06-11 | End: 2019-06-27

## 2019-06-11 RX ORDER — POTASSIUM CHLORIDE 20 MEQ/1
40 TABLET, EXTENDED RELEASE ORAL ONCE
Status: COMPLETED | OUTPATIENT
Start: 2019-06-11 | End: 2019-06-11

## 2019-06-11 RX ORDER — ATORVASTATIN CALCIUM 20 MG/1
20 TABLET, FILM COATED ORAL DAILY
Qty: 90 TABLET | Refills: 3
Start: 2019-06-12 | End: 2023-05-09 | Stop reason: SDUPTHER

## 2019-06-11 RX ORDER — LIDOCAINE 50 MG/G
1 PATCH TOPICAL DAILY
Refills: 0
Start: 2019-06-11 | End: 2019-06-27

## 2019-06-11 RX ADMIN — OXYCODONE HYDROCHLORIDE 10 MG: 10 TABLET ORAL at 03:06

## 2019-06-11 RX ADMIN — ASPIRIN 81 MG: 81 TABLET, COATED ORAL at 08:06

## 2019-06-11 RX ADMIN — VERAPAMIL HYDROCHLORIDE 180 MG: 180 TABLET, FILM COATED, EXTENDED RELEASE ORAL at 08:06

## 2019-06-11 RX ADMIN — LIDOCAINE 1 PATCH: 50 PATCH TOPICAL at 08:06

## 2019-06-11 RX ADMIN — LOSARTAN POTASSIUM 100 MG: 50 TABLET, FILM COATED ORAL at 08:06

## 2019-06-11 RX ADMIN — ATORVASTATIN CALCIUM 20 MG: 20 TABLET, FILM COATED ORAL at 08:06

## 2019-06-11 RX ADMIN — VITAMIN D, TAB 1000IU (100/BT) 1000 UNITS: 25 TAB at 08:06

## 2019-06-11 RX ADMIN — POTASSIUM CHLORIDE 40 MEQ: 1500 TABLET, EXTENDED RELEASE ORAL at 08:06

## 2019-06-11 NOTE — DISCHARGE SUMMARY
Discharge Summary  Hospital Medicine    Attending Provider on Discharge: Sincere Willingham     Discharging Team: [facility] HOSP MED C     Date of Admission:  6/7/2019         Date of Discharge:  6/11/2019      Diagnoses:     Principal Problem(s):   Atrial fibrillation with RVR     Secondary Problems:  Active Hospital Problems    Diagnosis    *Atrial fibrillation with RVR    Impaired mobility and ADLs    Hypotension        Hospital Course:      HPI:  Junior Beatty is a 58 y.o. man with a history of HTN, HLD, Vitamin-D deficiency, Arthrogryposis Multiplex Congenita, and Recent R Hip Replacement (currently at [facility]) who presents to [facility] complaining of sudden onset palpitations afternoon. Patient was recently admitted to [facility] for planned right hip replacement. Procedure went well no complications, and he was transition to inpatient rehab.  At [facility], he had been doing well and tolerated his 1st 2 sessions today with no issues.  However just prior to his 3rd session he had acute onset palpitations and lightheadedness.  Vitals were taken at [facility] and he was found to be hypotensive and tachycardic; he was transferred to [facility] ED for further evaluation.  During these episodes of palpitations, he denies chest pain, shortness of breath, nausea, vomiting, and diaphoresis.  He denies recent worsening PEREZ, orthopnea, PND, and lower extremity edema. He denies recent illnesses or sick contacts, fevers, chills, and night sweats.  He denies recent hot or cold intolerance, excessive swelling, dysphagia, odynophagia, and globus sensation.  He has never had episodes such as this before.  He has no family history of atrial fibrillation or early CAD.  He reports that his pain has been recently well controlled with Tylenol and p.r.n. oxycodone.  He does note that he was started on Robaxin for multi modality pain control yesterday evening.  Of note, patient has a history of Arthrogryposis Multiplex Congenita, and has had several orthopedic  surgeries to improve mobility in his joint.     In the ED, EKG showed AFib with RVR with initial heart rate of 140 and blood pressure as low as 87/58.  He was bolused 2 L normal saline with spontaneous conversion to normal sinus rhythm, confirmed on repeat 12 lead EKG. He did not receive any rate or rhythm control agents.  Currently, he denies palpitations, chest pain, weakness, and lightheadedness - he reports that he feels at his baseline. Labs notable for hemoglobin:  13.0, normal PT/INR, normal electrolytes and creatinine, largely normal LFTs aside from alk-phos:  145, troponin negative, lactate normal, UA with 2+ protein, TSH normal:  1.26.  D-dimer was elevated at 6.93, but CTA Chest showed no acute pulmonary embolism.  ECHO is completed and is normal.  Given low [id number] score the patient was not recommended for anticoagulation given risk outweighs benefits. The patient was deemed stable for transfer back to [facility] on 6/11. Please see below for further details:    Newly Diagnosed Atrial Fibrillation with RVR  · Admitted to [facility] for further evaluation and management.  Based on current available data, CHADSVASc: 1 (point for HTN) and HASBLED: 2.  In the ED, EKG showed AFib with RVR with initial heart rate of 140 and blood pressure as low as 87/58.  He was bolused 2 L normal saline with spontaneous conversion to normal sinus rhythm, confirmed on repeat 12 lead EKG.   · Continues telemetry while inpatient  · Will hold on anticoagulation for now given CHADSVASc: 1 and spontaneous conversion to NSR with IV fluid alone  · Will hold on increased rate and rhythm control agents for now as patient spontaneously converted to NSR  · TTE reviewed, largely normal   · Replete potassium greater than 4.0 and magnesium greater than 2.0     HTN  · Resumed home losartan 100 mg daily and verapamil 180 mg daily   · Goal BP less than 130/80     HLD  · Continue aspirin 81 mg daily and atorvastatin 20 mg daily     Vitamin-D  deficiency  · Continue vitamin-D 1000 units daily      Arthrogryposis Multiplex Congenita  Recent R Hip Replacement  · PT/OT consulted.  PMR consulted.  · Pain control with Tylenol 650 mg q.4 hours for mild pain, oxycodone 5 mg p.o. q.6 p.r.n. moderate pain and oxycodone 10 mg p.o. q.6 p.r.n. severe breakthrough pain  · Continue lidocaine 5% patch daily  · Holding Robaxin which was recently started at [facility]   · Fall precautions  · Contacted Orthopedics,  hip abductor at bedside    Significant Diagnostic Studies:   Labs:   Recent Labs     06/11/19  0508   WBC 7.95   RBC 4.73   HGB 13.5*   HCT 41.9      MCV 89   MCH 28.5   MCHC 32.2   GRAN 67.9  5.4   LYMPH 19.4  1.5   MONO 8.6  0.7   EOS 0.3      Recent Labs     06/11/19  0508   GLU 86      K 3.7      CO2 21*   BUN 15   CREATININE 0.7   CALCIUM 9.5   ANIONGAP 12   MG 2.1   PHOS 3.4      Cardiac Studies: TTE    Significant Treatments/Procedures:   IVF    Consults while in Hospital:   Rehabilitation Medicine    Discharge Medications:      Current Discharge Medication List      START taking these medications    Details   acetaminophen (TYLENOL) 325 MG tablet Take 2 tablets (650 mg total) by mouth every 4 (four) hours as needed.  Refills: 0      aspirin (ECOTRIN) 81 MG EC tablet Take 1 tablet (81 mg total) by mouth once daily.  Refills: 0      atorvastatin (LIPITOR) 20 MG tablet Take 1 tablet (20 mg total) by mouth once daily.  Qty: 90 tablet, Refills: 3      enoxaparin (LOVENOX) 40 mg/0.4 mL Syrg Inject 0.4 mLs (40 mg total) into the skin once daily.      lidocaine (LIDODERM) 5 % Place 1 patch onto the skin once daily. Remove & Discard patch within 12 hours or as directed by MD  Refills: 0      losartan (COZAAR) 100 MG tablet Take 1 tablet (100 mg total) by mouth once daily.  Qty: 90 tablet, Refills: 3      oxyCODONE (ROXICODONE) 5 MG immediate release tablet Take 1 tablet (5 mg total) by mouth every 6 (six) hours as needed.  Refills: 0       promethazine (PHENERGAN) 25 MG tablet Take 1 tablet (25 mg total) by mouth every 6 (six) hours as needed.      verapamil (CALAN-SR) 180 MG CR tablet Take 1 tablet (180 mg total) by mouth once daily.  Qty: 30 tablet, Refills: 11      vitamin D (VITAMIN D3) 1000 units Tab Take 1 tablet (1,000 Units total) by mouth once daily.            Discharge Diet:cardiac diet with Normal Fluid intake of 1500 - 2000 mL per day    Activity: ambulate in house with assistance    Discharged Condition: Stable    Discharge Disposition: Rehab Facility    Follow-up:   Ambulatory referral to cardiology/EP made.    Studies/Results pending on discharge:   None    Sincere Willingham MD  [facility] Medicine

## 2019-06-11 NOTE — PLAN OF CARE
Pt has been accepted to [facility] and can be transferred today once facility transfer orders are completed.  sw will arrange transportation through the patient flow center.     06/11/19 1156   Post-Acute Status   Post-Acute Authorization Placement   Post-Acute Placement Status Referrals Sent

## 2019-06-11 NOTE — ASSESSMENT & PLAN NOTE
-  EKG showed a-fib with RVR--> Treated with IVF with spontaneous conversion to NSR  -  Per primary team--> Holding AC and rate/rhythm control agents 2/2 spontaneous conversion to NSR with fluids alone

## 2019-06-11 NOTE — PT/OT/SLP PROGRESS
Physical Therapy Treatment    Patient Name:  Junior Beatty   MRN:  [id number]    Recommendations:     Discharge Recommendations:  rehabilitation facility   Discharge Equipment Recommendations: (TBD at next level of care)   Barriers to discharge:     Assessment:     Junior Beatty is a 58 y.o. male admitted with a medical diagnosis of Atrial fibrillation with RVR.  He presents with the following impairments/functional limitations:  weakness, impaired endurance, impaired functional mobilty, gait instability, impaired balance Patient reports eager to return to rehab and declines gait training due to stiffness and pain. Agreeable to education and exercise..    Rehab Prognosis: Good; patient would benefit from acute skilled PT services to address these deficits and reach maximum level of function.    Recent Surgery: * No surgery found *      Plan:     During this hospitalization, patient to be seen 3 x/week to address the identified rehab impairments via gait training, therapeutic activities, therapeutic exercises, neuromuscular re-education and progress toward the following goals:    · Plan of Care Expires:  07/07/19    Subjective     Chief Complaint: pain, stiffness since has not been moving around  Patient/Family Comments/goals: Pt reports eager to return to rehab today, expecting to d/c soon.   Pain/Comfort:  · Pain Rating 1: 8/10  · Location - Side 1: Right  · Location - Orientation 1: generalized  · Location 1: hip  · Pain Addressed 1: Pre-medicate for activity, Distraction(nurse provided meds; )  · Pain Rating Post-Intervention 1: 8/10      Objective:     Communicated with nurse prior to session.  Patient found HOB elevated with telemetry upon PT entry to room.     General Precautions: Standard, fall   Orthopedic Precautions:RLE posterior precautions   Braces:       Functional Mobility:  · Patient refused functional mobilty      AM-PAC 6 CLICK MOBILITY  Turning over in bed (including adjusting bedclothes, sheets  and blankets)?: 3  Sitting down on and standing up from a chair with arms (e.g., wheelchair, bedside commode, etc.): 3  Moving from lying on back to sitting on the side of the bed?: 3  Moving to and from a bed to a chair (including a wheelchair)?: 3  Need to walk in hospital room?: 3  Climbing 3-5 steps with a railing?: 2  Basic Mobility Total Score: 17       Therapeutic Activities and Exercises:   Patient educated on posterior LEAH precautions. Patient can name 3/3 without cues. Patient performs AP, QS x20 reps and reports performing frequently t/o the day.    Patient left HOB elevated with call button in reach..    GOALS:   Multidisciplinary Problems     Physical Therapy Goals     Not on file          Multidisciplinary Problems (Resolved)        Problem: Physical Therapy Goal    Goal Priority Disciplines Outcome Goal Variances Interventions   Physical Therapy Goal   (Resolved)     PT, PT/OT Outcome(s) achieved     Description:  Goals to be met by: 2019    Patient will increase functional independence with mobility by performin. Gait  x 100 feet with Supervision using Rolling Walker. - not met  2. Ascend/descend 8 stair with right Handrails CGA  - not met                      Time Tracking:     PT Received On: 19  PT Start Time: 1508     PT Stop Time: 1520  PT Total Time (min): 12 min     Billable Minutes: Therapeutic Activity 8    Treatment Type: Treatment  PT/PTA: PT           Oma Perez, PT  2019

## 2019-06-11 NOTE — PLAN OF CARE
Pt. will transfer back to [facility] to complete therapy for S/P hip replacement. Transportation to be facilitated by Adeline JONES.        06/11/19 8446   Final Note   Assessment Type Final Discharge Note   Anticipated Discharge Disposition Rehab   Right Care Referral Info   Post Acute Recommendation SNF / Sub-Acute Rehab   Facility Name [facility]

## 2019-06-11 NOTE — PROGRESS NOTES
Progress Note  Hospital Medicine    Primary Team: [facility]  Admit Date: 6/7/2019   Length of Stay:  LOS: 4 days   SUBJECTIVE:   Reason for Admission:  Atrial fibrillation with RVR    HPI:  Junior Beatyt is a 58 y.o. man with a history of HTN, HLD, Vitamin-D deficiency, Arthrogryposis Multiplex Congenita, and Recent R Hip Replacement (currently at [facility]) who presents to [facility] complaining of sudden onset palpitations afternoon. Patient was recently admitted to [facility] for planned right hip replacement. Procedure went well no complications, and he was transition to inpatient rehab.  At [facility], he had been doing well and tolerated his 1st 2 sessions today with no issues.  However just prior to his 3rd session he had acute onset palpitations and lightheadedness.  Vitals were taken at [facility] and he was found to be hypotensive and tachycardic; he was transferred to [facility] ED for further evaluation.  During these episodes of palpitations, he denies chest pain, shortness of breath, nausea, vomiting, and diaphoresis.  He denies recent worsening PEREZ, orthopnea, PND, and lower extremity edema. He denies recent illnesses or sick contacts, fevers, chills, and night sweats.  He denies recent hot or cold intolerance, excessive swelling, dysphagia, odynophagia, and globus sensation.  He has never had episodes such as this before.  He has no family history of atrial fibrillation or early CAD.  He reports that his pain has been recently well controlled with Tylenol and p.r.n. oxycodone.  He does note that he was started on Robaxin for multi modality pain control yesterday evening.  Of note, patient has a history of Arthrogryposis Multiplex Congenita, and has had several orthopedic surgeries to improve mobility in his joint.     In the ED, EKG showed AFib with RVR with initial heart rate of 140 and blood pressure as low as 87/58.  He was bolused 2 L normal saline with spontaneous conversion to normal sinus rhythm, confirmed on  repeat 12 lead EKG. He did not receive any rate or rhythm control agents.  Currently, he denies palpitations, chest pain, weakness, and lightheadedness - he reports that he feels at his baseline. Labs notable for hemoglobin:  13.0, normal PT/INR, normal electrolytes and creatinine, largely normal LFTs aside from alk-phos:  145, troponin negative, lactate normal, UA with 2+ protein, TSH normal:  1.26.  D-dimer was elevated at 6.93, but CTA Chest showed no acute pulmonary embolism.  ECHO is completed and is normal.    Interval history:    No acute events overnight.  Pt is pleased with plans for transfer back to [facility].  He offers no complaints.    Review of Systems:  Constitutional: no fever or chills  Respiratory: no cough or shortness of breath  Cardiovascular: no chest pain or palpitations  Gastrointestinal: no nausea or vomiting, no abdominal pain or change in bowel habits  Musculoskeletal: no arthralgias or myalgias     OBJECTIVE:     Temp:  [98.3 °F (36.8 °C)-99.1 °F (37.3 °C)]   Pulse:  [50-99]   Resp:  [17-18]   BP: (122-154)/(79-97)   SpO2:  [92 %-96 %]  Body mass index is 35.11 kg/m².  Intake/Outake:  This Shift:  No intake/output data recorded.    Net I/O past 24h:     Intake/Output Summary (Last 24 hours) at 6/11/2019 0825  Last data filed at 6/11/2019 0600  Gross per 24 hour   Intake 1440 ml   Output 2125 ml   Net -685 ml             Physical Exam:  Gen- well-developed, well-nourished, NAD  CVS- S1 and S2 present, RRR, no murmurs  Resp- CTA b/l, no work of breathing  Abd- BS+, soft, NT, ND  Ext- no clubbing or cyanosis, RLE >LLE    Laboratory:  CBC/Anemia Labs: Coags:    Recent Labs   Lab 06/09/19  0351 06/10/19  0349 06/11/19  0508   WBC 6.03 6.87 7.95   HGB 12.0* 12.5* 13.5*   HCT 38.3* 38.9* 41.9    264 273   MCV 90 91 89   RDW 14.3 14.5 14.6*    Recent Labs   Lab 06/07/19  1526   INR 0.9        Chemistries:   Recent Labs   Lab 06/07/19  1525  06/09/19  0351 06/10/19  0349 06/11/19  0508       < > 141 140 138   K 3.8   < > 3.9 4.0 3.7      < > 110 109 105   CO2 18*   < > 23 20* 21*   BUN 19   < > 12 17 15   CREATININE 0.8   < > 0.6 0.7 0.7   CALCIUM 9.1   < > 9.0 9.2 9.5   PROT 6.5  --   --   --   --    BILITOT 0.4  --   --   --   --    ALKPHOS 143*  --   --   --   --    ALT 33  --   --   --   --    AST 30  --   --   --   --    MG 2.1   < > 2.0 2.0 2.1   PHOS  --    < > 3.3 3.8 3.4    < > = values in this interval not displayed.        Cardiac Enzymes: Ejection Fractions:    No results for input(s): CPK, CPKMB, MB, TROPONINI in the last 72 hours. No results found for: EF      Medications:  Scheduled Meds:   aspirin  81 mg Oral Daily    atorvastatin  20 mg Oral Daily    enoxaparin  40 mg Subcutaneous Daily    lidocaine  1 patch Transdermal Q24H    losartan  100 mg Oral Daily    potassium chloride SA  40 mEq Oral Once    verapamil  180 mg Oral Daily    vitamin D  1,000 Units Oral Daily                             Continuous Infusions:  PRN Meds:.acetaminophen, ondansetron, oxyCODONE, oxyCODONE, promethazine, sodium chloride 0.9%     ASSESSMENT/PLAN:     Active Hospital Problems    Diagnosis  POA    *Atrial fibrillation with RVR [I48.91]  Yes    Impaired mobility and ADLs [Z74.09]  Yes    Hypotension [I95.9]  Yes      Resolved Hospital Problems   No resolved problems to display.     Newly Diagnosed Atrial Fibrillation with RVR  · Admitted to [facility] for further evaluation and management.  Based on current available data, CHADSVASc: 1 (point for HTN) and HASBLED: 2.  In the ED, EKG showed AFib with RVR with initial heart rate of 140 and blood pressure as low as 87/58.  He was bolused 2 L normal saline with spontaneous conversion to normal sinus rhythm, confirmed on repeat 12 lead EKG.   · Continues telemetry while inpatient  · Will hold on anticoagulation for now given CHADSVASc: 1 and spontaneous conversion to NSR with IV fluid alone  · Will hold on rate and rhythm control agents for now as  patient spontaneously converted to NSR  · TTE reviewed, largely normal   · Replete potassium greater than 4.0 and magnesium greater than 2.0     HTN  · Resumed home losartan 100 mg daily and verapamil 180 mg daily   · Goal BP less than 130/80     HLD  · Continue aspirin 81 mg daily and atorvastatin 20 mg daily     Vitamin-D deficiency  · Continue vitamin-D 1000 units daily      Arthrogryposis Multiplex Congenita  Recent R Hip Replacement  · PT/OT consulted.  PMR consulted.  · Pain control with Tylenol 650 mg q.4 hours for mild pain, oxycodone 5 mg p.o. q.6 p.r.n. moderate pain and oxycodone 10 mg p.o. q.6 p.r.n. severe breakthrough pain  · Continue lidocaine 5% patch daily  · Holding Robaxin which was recently started at [facility]   · Fall precautions  · Contacted Orthopedics, bringing hip abductor to bedside    DVT ppx- Lovenox  CODE Status- FULL    Dispo- discharge today to [facility]; 35 minutes    Sincere Willingham MD  Hospital Medicine Staff

## 2019-06-11 NOTE — PLAN OF CARE
Problem: Adult Inpatient Plan of Care  Goal: Plan of Care Review  Outcome: Ongoing (interventions implemented as appropriate)  Pt remained free of falls, trauma, injury. VSS.  Skin CDI. Pt ambulated with one person assist with walker. Pt to be sent back to rehab pending insurance approval. Reviewed plan of care with pt; answered all questions. Pt tolerating plan of care.

## 2019-06-11 NOTE — PROGRESS NOTES
[facility]  Physical Medicine & Rehab  Progress Note    Patient Name: Junior eBatty  MRN: [id number]  Admission Date: 6/7/2019  Length of Stay: 4 days  Attending Physician: Sincere Willingham MD    Subjective:     Principal Problem:Atrial fibrillation with RVR    Hospital Course:   6/8/19:  Evaluated by PT.  Bed mobility Tiny.  Sit to stand SBA with RW.  Ambulated 30 ft SBA with RW.    6/9/19:  Evaluated by OT.  Bed mobility Tiny.  Sit to stand and functional mobility SBA with RW.  UBD, LBD, and toileting SBA.  6/10/19:  No PT or OT.      Interval History 6/11/2019:  Patient is seen for follow-up rehab evaluation and recommendations: No therapy yesterday.  Awaiting insurance approval to return to rehab.      HPI, Past Medical, Family, and Social History remains the same as documented in the initial encounter.    Scheduled Medications:    aspirin  81 mg Oral Daily    atorvastatin  20 mg Oral Daily    enoxaparin  40 mg Subcutaneous Daily    lidocaine  1 patch Transdermal Q24H    losartan  100 mg Oral Daily    verapamil  180 mg Oral Daily    vitamin D  1,000 Units Oral Daily     PRN Medications: acetaminophen, ondansetron, oxyCODONE, oxyCODONE, promethazine, sodium chloride 0.9%    Review of Systems   Constitutional: Positive for activity change. Negative for chills, fatigue and fever.   HENT: Negative for drooling, hearing loss, trouble swallowing and voice change.    Eyes: Negative for pain and visual disturbance.   Respiratory: Negative for cough, shortness of breath and wheezing.    Cardiovascular: Negative for chest pain and palpitations.   Gastrointestinal: Negative for abdominal pain, nausea and vomiting.   Genitourinary: Negative for difficulty urinating and flank pain.   Musculoskeletal: Positive for arthralgias, gait problem and myalgias.   Skin: Negative for pallor and rash.   Neurological: Negative for dizziness, weakness, numbness and headaches.   Psychiatric/Behavioral: Negative  for agitation and hallucinations. The patient is not nervous/anxious.      Objective:     Vital Signs (Most Recent):  Temp: 98.3 °F (36.8 °C) (06/11/19 1107)  Pulse: 82 (06/11/19 1127)  Resp: 20 (06/11/19 1107)  BP: (!) 140/94 (06/11/19 1107)  SpO2: (!) 91 % (06/11/19 1107)    Vital Signs (24h Range):  Temp:  [98.3 °F (36.8 °C)-99.1 °F (37.3 °C)] 98.3 °F (36.8 °C)  Pulse:  [50-99] 82  Resp:  [16-20] 20  SpO2:  [91 %-96 %] 91 %  BP: (122-154)/(79-97) 140/94     Physical Exam   Constitutional: He is oriented to person, place, and time. He appears well-developed and well-nourished. No distress.   HENT:   Head: Normocephalic and atraumatic.   Right Ear: External ear normal.   Left Ear: External ear normal.   Nose: Nose normal.   Eyes: Right eye exhibits no discharge. Left eye exhibits no discharge. No scleral icterus.   Neck: Normal range of motion.   Cardiovascular: Normal rate and intact distal pulses.   Pulmonary/Chest: Effort normal. No respiratory distress. He has no wheezes.   Abdominal: Soft. He exhibits no distension. There is no tenderness.   Musculoskeletal: Normal range of motion. He exhibits no edema or tenderness.   BUE deformity and decreased ROM   Neurological: He is alert and oriented to person, place, and time. He exhibits normal muscle tone.   Skin: Skin is warm and dry. No rash noted.   Psychiatric: He has a normal mood and affect. His behavior is normal. Thought content normal.   Vitals reviewed.    Diagnostic Results:   Labs: Reviewed  X-Ray: Reviewed  CT: Reviewed    Assessment/Plan:      * Atrial fibrillation with RVR  -  EKG showed a-fib with RVR--> Treated with IVF with spontaneous conversion to NSR  -  Per primary team--> Holding AC and rate/rhythm control agents 2/2 spontaneous conversion to NSR with fluids alone    Hypotension  -  Hypotensive in ED- treated with IVF  -  Management per primary team    Impaired mobility and ADLs  -  S/p R LEAH with subsequent discharge to [facility] on  5/29/19  -  [facility]  Recommendations  -  Encourage mobility, OOB in chair, and early ambulation as appropriate  -  PT/OT evaluate and treat  -  Pain management  -  DVT prophylaxis  -  Monitor for and prevent skin breakdown and pressure ulcers  · Early mobility, repositioning/weight shifting every 20-30 minutes when sitting, turn patient every 2 hours, proper mattress/overlay and chair cushioning, pressure relief/heel protector boots    Recommend return to Inpatient Rehab.  Insurance pending.     HALI Garcia  Department of Physical Medicine & Rehab   [facility]

## 2019-06-11 NOTE — NURSING
Patient is ready for discharge. Patient stable alert and oriented. IVs removed. No complaints of pain. Discussed discharge plan. Reviewed medications and side effects, appointments, and answered questions with patient and family. Pt was discharged back to Previous rehab facility. Transport picked patient up via wheelchair and transported them to the rehab facility.

## 2019-06-11 NOTE — SUBJECTIVE & OBJECTIVE
Interval History 6/11/2019:  Patient is seen for follow-up rehab evaluation and recommendations: No therapy yesterday.  Awaiting insurance approval to return to rehab.      HPI, Past Medical, Family, and Social History remains the same as documented in the initial encounter.    Scheduled Medications:    aspirin  81 mg Oral Daily    atorvastatin  20 mg Oral Daily    enoxaparin  40 mg Subcutaneous Daily    lidocaine  1 patch Transdermal Q24H    losartan  100 mg Oral Daily    verapamil  180 mg Oral Daily    vitamin D  1,000 Units Oral Daily     PRN Medications: acetaminophen, ondansetron, oxyCODONE, oxyCODONE, promethazine, sodium chloride 0.9%    Review of Systems   Constitutional: Positive for activity change. Negative for chills, fatigue and fever.   HENT: Negative for drooling, hearing loss, trouble swallowing and voice change.    Eyes: Negative for pain and visual disturbance.   Respiratory: Negative for cough, shortness of breath and wheezing.    Cardiovascular: Negative for chest pain and palpitations.   Gastrointestinal: Negative for abdominal pain, nausea and vomiting.   Genitourinary: Negative for difficulty urinating and flank pain.   Musculoskeletal: Positive for arthralgias, gait problem and myalgias.   Skin: Negative for pallor and rash.   Neurological: Negative for dizziness, weakness, numbness and headaches.   Psychiatric/Behavioral: Negative for agitation and hallucinations. The patient is not nervous/anxious.      Objective:     Vital Signs (Most Recent):  Temp: 98.3 °F (36.8 °C) (06/11/19 1107)  Pulse: 82 (06/11/19 1127)  Resp: 20 (06/11/19 1107)  BP: (!) 140/94 (06/11/19 1107)  SpO2: (!) 91 % (06/11/19 1107)    Vital Signs (24h Range):  Temp:  [98.3 °F (36.8 °C)-99.1 °F (37.3 °C)] 98.3 °F (36.8 °C)  Pulse:  [50-99] 82  Resp:  [16-20] 20  SpO2:  [91 %-96 %] 91 %  BP: (122-154)/(79-97) 140/94     Physical Exam   Constitutional: He is oriented to person, place, and time. He appears  well-developed and well-nourished. No distress.   HENT:   Head: Normocephalic and atraumatic.   Right Ear: External ear normal.   Left Ear: External ear normal.   Nose: Nose normal.   Eyes: Right eye exhibits no discharge. Left eye exhibits no discharge. No scleral icterus.   Neck: Normal range of motion.   Cardiovascular: Normal rate and intact distal pulses.   Pulmonary/Chest: Effort normal. No respiratory distress. He has no wheezes.   Abdominal: Soft. He exhibits no distension. There is no tenderness.   Musculoskeletal: Normal range of motion. He exhibits no edema or tenderness.   BUE deformity and decreased ROM   Neurological: He is alert and oriented to person, place, and time. He exhibits normal muscle tone.   Skin: Skin is warm and dry. No rash noted.   Psychiatric: He has a normal mood and affect. His behavior is normal. Thought content normal.   Vitals reviewed.    Diagnostic Results:   Labs: Reviewed  X-Ray: Reviewed  CT: Reviewed    NEUROLOGICAL EXAMINATION:     MENTAL STATUS   Oriented to person, place, and time.

## 2019-06-11 NOTE — PLAN OF CARE
[facility]    FACILITY TRANSFER ORDERS      Patient Name: Junior Beatty  YOB: 1960    Encounter Date: 06/11/2019    Admit to: [facility]    Vital Signs:  Routine    Diagnoses:   Active Hospital Problems    Diagnosis  POA    *Atrial fibrillation with RVR [I48.91]  Yes    Impaired mobility and ADLs [Z74.09]  Yes    Hypotension [I95.9]  Yes      Resolved Hospital Problems   No resolved problems to display.       Allergies:  Review of patient's allergies indicates:   Allergen Reactions    Robaxin [methocarbamol]      Pt believes this incited A.fib    Tramadol      Other reaction(s): Dizziness       Diet: cardiac diet    Activities: Up with assistance    Nursing: Fall precautions    Labs: CBC and BMP Every Monday/Thursday or per facility protocol     CONSULTS:    Physical Therapy to evaluate and treat.  and Occupational Therapy to evaluate and treat.    MISCELLANEOUS CARE:  Routine Skin for Bedridden Patients: Apply moisture barrier cream to all skin folds and wet areas in perineal area daily and after baths and all bowel movements.    Medications: Review discharge medications with patient and family and provide education.      Current Discharge Medication List      START taking these medications    Details   acetaminophen (TYLENOL) 325 MG tablet Take 2 tablets (650 mg total) by mouth every 4 (four) hours as needed.  Refills: 0      aspirin (ECOTRIN) 81 MG EC tablet Take 1 tablet (81 mg total) by mouth once daily.  Refills: 0      atorvastatin (LIPITOR) 20 MG tablet Take 1 tablet (20 mg total) by mouth once daily.  Qty: 90 tablet, Refills: 3      enoxaparin (LOVENOX) 40 mg/0.4 mL Syrg Inject 0.4 mLs (40 mg total) into the skin once daily.      lidocaine (LIDODERM) 5 % Place 1 patch onto the skin once daily. Remove & Discard patch within 12 hours or as directed by MD  Refills: 0      losartan (COZAAR) 100 MG tablet Take 1 tablet (100 mg total) by mouth once daily.  Qty: 90 tablet,  Refills: 3      oxyCODONE (ROXICODONE) 5 MG immediate release tablet Take 1 tablet (5 mg total) by mouth every 6 (six) hours as needed.  Refills: 0      promethazine (PHENERGAN) 25 MG tablet Take 1 tablet (25 mg total) by mouth every 6 (six) hours as needed.      verapamil (CALAN-SR) 180 MG CR tablet Take 1 tablet (180 mg total) by mouth once daily.  Qty: 30 tablet, Refills: 11      vitamin D (VITAMIN D3) 1000 units Tab Take 1 tablet (1,000 Units total) by mouth once daily.                  _________________________________  Sincere Willingham MD  06/11/2019

## 2019-06-12 NOTE — PHYSICIAN QUERY
PT Name: Junior Beatty  MR #: [id number]    Physician Query Form - Atrial Fibrillation Specificity     CDS/: Jermaine Mensah Jr, RN               Contact information:[email or web address]     This form is a permanent document in the medical record.     Query Date: June 12, 2019    By submitting this query, we are merely seeking further clarification of documentation. Please utilize your independent clinical judgment when addressing the question(s) below.    The medical record contains the following:   Indicators     Supporting Clinical Findings Location in Medical Record   x Atrial Fibrillation Newly Diagnosed Atrial Fibrillation with RVR 6/7 H&P   x EKG results In the ED, EKG showed AFib with RVR with initial heart rate of 140     Normal sinus rhythm 6/7 H&P      6/8 EKG Report    Medication     x Treatment Will hold on rate and rhythm control agents for now as patient spontaneously converted to NSR 6/11 Hospital Medicine Progress Note    Other         Provider, please further specify the Atrial Fibrillation diagnosis.    [ x ] Paroxysmal   [  ] Other (please specify):   [  ] Clinically Undetermined       Please document in your progress notes daily for the duration of treatment until resolved, and include in your discharge summary.

## 2019-06-17 NOTE — PROGRESS NOTES
Physical Therapy Discharge Summary    Name: Junior Beatty  MRN: [id number]   Principal Problem: Atrial fibrillation with RVR     Patient Discharged from acute Physical Therapy on 2019.  Please refer to prior PT noted date on 2019 for functional status.     Assessment:     Patient appropriate for care in another setting.    Objective:     GOALS:   Multidisciplinary Problems     Physical Therapy Goals     Not on file          Multidisciplinary Problems (Resolved)        Problem: Physical Therapy Goal    Goal Priority Disciplines Outcome Goal Variances Interventions   Physical Therapy Goal   (Resolved)     PT, PT/OT Outcome(s) achieved     Description:  Goals to be met by: 2019    Patient will increase functional independence with mobility by performin. Gait  x 100 feet with Supervision using Rolling Walker. - not met  2. Ascend/descend 8 stair with right Handrails CGA  - not met                      Reasons for Discontinuation of Therapy Services  Transfer to alternate level of care.      Plan:     Patient Discharged to: Inpatient Rehab.    Oma Perez, PT  2019

## 2019-06-21 PROCEDURE — G0180 MD CERTIFICATION HHA PATIENT: HCPCS | Mod: ,,, | Performed by: PHYSICAL MEDICINE & REHABILITATION

## 2019-06-21 PROCEDURE — G0180 PR HOME HEALTH MD CERTIFICATION: ICD-10-PCS | Mod: ,,, | Performed by: PHYSICAL MEDICINE & REHABILITATION

## 2019-06-27 ENCOUNTER — TELEPHONE (OUTPATIENT)
Dept: ELECTROPHYSIOLOGY | Facility: CLINIC | Age: 59
End: 2019-06-27

## 2019-06-27 PROBLEM — Q74.3 ARTHROGRYPOSIS MULTIPLEX CONGENITA: Status: ACTIVE | Noted: 2019-06-27

## 2019-06-27 PROBLEM — M19.90 OSTEOARTHRITIS: Status: ACTIVE | Noted: 2019-06-27

## 2019-06-27 PROBLEM — E55.9 VITAMIN D DEFICIENCY: Status: ACTIVE | Noted: 2019-06-27

## 2019-06-27 PROBLEM — Z96.641 PRESENCE OF RIGHT ARTIFICIAL HIP JOINT: Status: ACTIVE | Noted: 2019-05-29

## 2019-06-27 PROBLEM — I10 HYPERTENSION: Status: ACTIVE | Noted: 2019-06-27

## 2019-06-27 PROBLEM — E78.5 HYPERLIPIDEMIA: Status: ACTIVE | Noted: 2019-06-27

## 2019-06-27 PROBLEM — I95.9 HYPOTENSION: Status: RESOLVED | Noted: 2019-06-07 | Resolved: 2019-06-27

## 2019-06-27 RX ORDER — NAPROXEN SODIUM 220 MG/1
81 TABLET, FILM COATED ORAL DAILY
COMMUNITY
Start: 2019-06-25

## 2019-06-27 RX ORDER — CHOLECALCIFEROL (VITAMIN D3) 25 MCG
1000 TABLET ORAL DAILY
COMMUNITY
Start: 2019-06-21

## 2019-06-27 NOTE — TELEPHONE ENCOUNTER
----- Message from Tiera Newell RN sent at 6/27/2019 12:34 PM CDT -----  Regarding: FW: referral  Komal, can you please schedule patient for new patient apt with [facility]. New onset AF. Thanks! Tiera  ----- Message -----  From: Katelin Leon  Sent: 6/27/2019   9:17 AM  To: [facility] Clinical Support Staff  Subject: referral                                         Referral for EP. Thanks, Katelin

## 2019-06-27 NOTE — TELEPHONE ENCOUNTER
"Called pt & adv I was calling to schedule new pt apt for AFIB; pt responded "what about it?", & stated he doesn't need an appointment scheduled bc of it.  Per pt, believes he went into AFIB due to a reaction to a medication & hasn't had AFIB since, & does not want an apt scheduled.  I told pt I understood & apologized for bothering him.  "

## 2019-07-03 ENCOUNTER — EXTERNAL HOME HEALTH (OUTPATIENT)
Dept: HOME HEALTH SERVICES | Facility: HOSPITAL | Age: 59
End: 2019-07-03
Payer: COMMERCIAL

## 2019-07-22 RX ORDER — CELECOXIB 200 MG/1
CAPSULE ORAL
Qty: 60 CAPSULE | Refills: 0 | OUTPATIENT
Start: 2019-07-22

## 2019-12-24 NOTE — PT/OT/SLP DISCHARGE
Occupational Therapy Discharge Summary    Junior Beatty  MRN: [id number]   Principal Problem: Atrial fibrillation with RVR      Patient Discharged from acute Occupational Therapy on 1224/19.  Please refer to prior OT note dated 6/9/19 for functional status.    Assessment:      Patient has not met goals.    Objective:     GOALS:   Multidisciplinary Problems     Occupational Therapy Goals     Not on file          Multidisciplinary Problems (Resolved)        Problem: Occupational Therapy Goal    Goal Priority Disciplines Outcome Interventions   Occupational Therapy Goal   (Resolved)     OT, PT/OT Outcome(s) achieved    Description:  Goals to be met by: 6/19     Patient will increase functional independence with ADLs by performing:    UE Dressing with Modified [place].  LE Dressing with Modified [place] with AE.  Grooming while standing with Modified [place].  Toileting from toilet with Modified [place] for hygiene and clothing management.   Supine to sit with Supervision.  Stand pivot transfers with Modified [place] using RW.                      Reasons for Discontinuation of Therapy Services  Transfer to alternate level of care.      Plan:     Patient Discharged to: Inpatient Rehab    HARRY Hewitt  12/24/2019

## 2020-10-20 ENCOUNTER — HOSPITAL ENCOUNTER (OUTPATIENT)
Dept: PREADMISSION TESTING | Facility: OTHER | Age: 60
Discharge: HOME | End: 2020-10-20
Attending: ORTHOPAEDIC SURGERY
Payer: COMMERCIAL

## 2020-10-20 ENCOUNTER — ANESTHESIA EVENT (OUTPATIENT)
Dept: SURGERY | Facility: OTHER | Age: 60
End: 2020-10-20
Payer: COMMERCIAL

## 2020-10-20 VITALS
HEART RATE: 79 BPM | HEIGHT: 66 IN | WEIGHT: 215 LBS | TEMPERATURE: 96 F | SYSTOLIC BLOOD PRESSURE: 139 MMHG | DIASTOLIC BLOOD PRESSURE: 85 MMHG | BODY MASS INDEX: 34.55 KG/M2 | OXYGEN SATURATION: 96 %

## 2020-10-20 DIAGNOSIS — Z01.818 PRE-OP TESTING: ICD-10-CM

## 2020-10-20 PROCEDURE — U0003 INFECTIOUS AGENT DETECTION BY NUCLEIC ACID (DNA OR RNA); SEVERE ACUTE RESPIRATORY SYNDROME CORONAVIRUS 2 (SARS-COV-2) (CORONAVIRUS DISEASE [COVID-19]), AMPLIFIED PROBE TECHNIQUE, MAKING USE OF HIGH THROUGHPUT TECHNOLOGIES AS DESCRIBED BY CMS-2020-01-R: HCPCS

## 2020-10-20 RX ORDER — SODIUM CHLORIDE, SODIUM LACTATE, POTASSIUM CHLORIDE, CALCIUM CHLORIDE 600; 310; 30; 20 MG/100ML; MG/100ML; MG/100ML; MG/100ML
INJECTION, SOLUTION INTRAVENOUS CONTINUOUS
Status: CANCELLED | OUTPATIENT
Start: 2020-10-20

## 2020-10-20 RX ORDER — TRANDOLAPRIL 2 MG/1
2 TABLET ORAL DAILY
COMMUNITY
End: 2023-05-09 | Stop reason: SDUPTHER

## 2020-10-20 RX ORDER — GARLIC 1000 MG
CAPSULE ORAL
COMMUNITY

## 2020-10-20 RX ORDER — IBUPROFEN 100 MG/5ML
1000 SUSPENSION, ORAL (FINAL DOSE FORM) ORAL DAILY
COMMUNITY

## 2020-10-20 RX ORDER — VITAMIN B COMPLEX
1 CAPSULE ORAL DAILY
COMMUNITY

## 2020-10-20 RX ORDER — ACETAMINOPHEN 500 MG
1000 TABLET ORAL
Status: CANCELLED | OUTPATIENT
Start: 2020-10-20 | End: 2020-10-20

## 2020-10-20 RX ORDER — PREGABALIN 50 MG/1
50 CAPSULE ORAL
Status: CANCELLED | OUTPATIENT
Start: 2020-10-20 | End: 2020-10-20

## 2020-10-20 RX ORDER — CALCIUM CARBONATE/VITAMIN D3 600MG-5MCG
1 TABLET ORAL ONCE
COMMUNITY

## 2020-10-20 RX ORDER — LIDOCAINE HYDROCHLORIDE 10 MG/ML
0.5 INJECTION, SOLUTION EPIDURAL; INFILTRATION; INTRACAUDAL; PERINEURAL ONCE
Status: CANCELLED | OUTPATIENT
Start: 2020-10-20 | End: 2020-10-20

## 2020-10-20 NOTE — DISCHARGE INSTRUCTIONS
Information to Prepare you for your Surgery    PRE-ADMIT TESTING -  [phone]    [address]  [facility]          Your surgery has been scheduled at [facility]. We are pleased to have the opportunity to serve you. For Further Information please call [phone].    On the day of surgery please report to the Information Desk on the 1st floor.    · CONTACT YOUR PHYSICIAN'S OFFICE THE DAY PRIOR TO YOUR SURGERY TO OBTAIN YOUR ARRIVAL TIME.     · The evening before surgery do not eat anything after 9 p.m. ( this includes hard candy, chewing gum and mints).  You may only have GATORADE, POWERADE AND WATER  from 9 p.m. until you leave your home.   DO NOT DRINK ANY LIQUIDS ON THE WAY TO THE HOSPITAL.      SPECIAL MEDICATION INSTRUCTIONS: TAKE medications checked off by the Anesthesiologist on your Medication List.    Angiogram Patients: Take medications as instructed by your physician, including aspirin.     Surgery Patients:    If you take ASPIRIN - Your PHYSICIAN/SURGEON will need to inform you IF/OR when you need to stop taking aspirin prior to your surgery.     Do Not take any medications containing IBUPROFEN.  Do Not Wear any make-up or dark nail polish   (especially eye make-up) to surgery. If you come to surgery with makeup on you will be required to remove the makeup or nail polish.    Do not shave your surgical area at least 5 days prior to your surgery. The surgical prep will be performed at the hospital according to Infection Control regulations.    Leave all valuables at home.   Do Not wear any jewelry or watches, including any metal in body piercings. Jewelry must be removed prior to coming to the hospital.  There is a possibility that rings that are unable to be removed may be cut off if they are on the surgical extremity.    Contact Lens must be removed before surgery. Either do not wear the contact lens or bring a case and solution for  storage.  Please bring a container for eyeglasses or dentures as required.  Bring any paperwork your physician has provided, such as consent forms,  history and physicals, doctor's orders, etc.   Bring comfortable clothes that are loose fitting to wear upon discharge. Take into consideration the type of surgery being performed.  Maintain your diet as advised per your physician the day prior to surgery.      Adequate rest the night before surgery is advised.   Park in the Parking lot behind the hospital or in the [place] [facility] across the street from the parking lot. Parking is complimentary.  If you will be discharged the same day as your procedure, please arrange for a responsible adult to drive you home or to accompany you if traveling by taxi.   YOU WILL NOT BE PERMITTED TO DRIVE OR TO LEAVE THE HOSPITAL ALONE AFTER SURGERY.   If you are being discharged the same day, it is strongly recommended that you arrange for someone to remain with you for the first 24 hrs following your surgery.    The Surgeon will speak to your family/visitor after your surgery regarding the outcome of your surgery and post op care.  The Surgeon may speak to you after your surgery, but there is a possibility you may not remember the details.  Please check with your family members regarding the conversation with the Surgeon.    We strongly recommend whoever is bringing you home be present for discharge instructions.  This will ensure a thorough understanding for your post op home care.    ALL CHILDREN MUST ALWAYS BE ACCOMPANIED BY AN ADULT.    Visitors-Refer to current Visitor policy handouts.    Thank you for your cooperation.  The Staff of [facility].                Bathing Instructions with Hibiclens     Shower the evening before and morning of your procedure with Hibiclens:   Wash your face with water and your regular face wash/soap   Apply Hibiclens directly on your skin or on a wet washcloth and wash  gently. When showering: Move away from the shower stream when applying Hibiclens to avoid rinsing off too soon.   Rinse thoroughly with warm water   Do not dilute Hibiclens         Dry off as usual, do not use any deodorant, powder, body lotions, perfume, after shave or cologne.

## 2020-10-20 NOTE — ANESTHESIA PREPROCEDURE EVALUATION
10/20/2020  Junior Beatty is a 60 y.o., male.    Anesthesia Evaluation    I have reviewed the Patient Summary Reports.    I have reviewed the Nursing Notes. I have reviewed the NPO Status.   I have reviewed the Medications.     Review of Systems  Anesthesia Hx:  PONV History of prior surgery of interest to airway management or planning: Previous anesthesia: Spinal   May 2019 Dr Marci Reveles converted to GA  with spinal.  Procedure performed at an [facility]. Denies Family Hx of Anesthesia complications.  Personal Hx of Anesthesia complications, Post-Operative Nausea/Vomiting, with every anesthetic, treatment not known   Social:  Non-Smoker    Hematology/Oncology:  Hematology Normal   Oncology Normal     EENT/Dental:EENT/Dental Normal   Cardiovascular:   Hypertension, well controlled ECG has been reviewed. NSR w non sp st t wave changes   Pulmonary:  Pulmonary Normal    Renal/:  Renal/ Normal     Hepatic/GI:  Hepatic/GI Normal    Musculoskeletal:   Arthritis  Congenital bone disorder   Neurological:  Neurology Normal    Endocrine:  Endocrine Normal    Dermatological:  Skin Normal    Psych:  Psychiatric Normal           Physical Exam  General:  Obesity    Airway/Jaw/Neck:  Airway Findings: Mouth Opening: Normal Tongue: Normal  General Airway Assessment: Adult  Mallampati: II      Dental:  Dental Findings: Molar caps        Mental Status:  Mental Status Findings:  Cooperative, Alert and Oriented         Anesthesia Plan  Type of Anesthesia, risks & benefits discussed:  Anesthesia Type:  general  Patient's Preference:   Intra-op Monitoring Plan: standard ASA monitors  Intra-op Monitoring Plan Comments:   Post Op Pain Control Plan: per primary service following discharge from PACU  Post Op Pain Control Plan Comments:   Induction:   IV  Beta Blocker:         Informed Consent: Patient understands risks  and agrees with Anesthesia plan.  Questions answered. Anesthesia consent signed with patient.  ASA Score: 2     Day of Surgery Review of History & Physical:    H&P update referred to the surgeon.     Anesthesia Plan Notes: Very diff IV stick!!Post op N and V,no labs needed        Ready For Surgery From Anesthesia Perspective.

## 2020-10-21 LAB — SARS-COV-2 RNA RESP QL NAA+PROBE: NOT DETECTED

## 2020-10-23 ENCOUNTER — HOSPITAL ENCOUNTER (OUTPATIENT)
Facility: OTHER | Age: 60
Discharge: HOME | End: 2020-10-23
Attending: ORTHOPAEDIC SURGERY | Admitting: ORTHOPAEDIC SURGERY
Payer: COMMERCIAL

## 2020-10-23 ENCOUNTER — ANESTHESIA (OUTPATIENT)
Dept: SURGERY | Facility: OTHER | Age: 60
End: 2020-10-23
Payer: COMMERCIAL

## 2020-10-23 VITALS
HEIGHT: 66 IN | HEART RATE: 67 BPM | DIASTOLIC BLOOD PRESSURE: 73 MMHG | RESPIRATION RATE: 18 BRPM | OXYGEN SATURATION: 94 % | BODY MASS INDEX: 34.55 KG/M2 | TEMPERATURE: 99 F | WEIGHT: 215 LBS | SYSTOLIC BLOOD PRESSURE: 134 MMHG

## 2020-10-23 DIAGNOSIS — Z01.818 PRE-OP TESTING: ICD-10-CM

## 2020-10-23 DIAGNOSIS — S44.02XA INJURY OF LEFT ULNAR NERVE AT UPPER ARM LEVEL, INITIAL ENCOUNTER: Primary | ICD-10-CM

## 2020-10-23 PROCEDURE — 25000242 PHARM REV CODE 250 ALT 637 W/ HCPCS: Performed by: ANESTHESIOLOGY

## 2020-10-23 PROCEDURE — 25000003 PHARM REV CODE 250: Performed by: ANESTHESIOLOGY

## 2020-10-23 PROCEDURE — 37000008 HC ANESTHESIA 1ST 15 MINUTES: Performed by: ORTHOPAEDIC SURGERY

## 2020-10-23 PROCEDURE — 36000707: Performed by: ORTHOPAEDIC SURGERY

## 2020-10-23 PROCEDURE — 37000009 HC ANESTHESIA EA ADD 15 MINS: Performed by: ORTHOPAEDIC SURGERY

## 2020-10-23 PROCEDURE — 63600175 PHARM REV CODE 636 W HCPCS: Performed by: ANESTHESIOLOGY

## 2020-10-23 PROCEDURE — 71000033 HC RECOVERY, INTIAL HOUR: Performed by: ORTHOPAEDIC SURGERY

## 2020-10-23 PROCEDURE — 71000015 HC POSTOP RECOV 1ST HR: Performed by: ORTHOPAEDIC SURGERY

## 2020-10-23 PROCEDURE — 63600175 PHARM REV CODE 636 W HCPCS: Performed by: NURSE ANESTHETIST, CERTIFIED REGISTERED

## 2020-10-23 PROCEDURE — 25000003 PHARM REV CODE 250: Performed by: ORTHOPAEDIC SURGERY

## 2020-10-23 PROCEDURE — 63600175 PHARM REV CODE 636 W HCPCS: Performed by: ORTHOPAEDIC SURGERY

## 2020-10-23 PROCEDURE — 36000706: Performed by: ORTHOPAEDIC SURGERY

## 2020-10-23 PROCEDURE — 71000039 HC RECOVERY, EACH ADD'L HOUR: Performed by: ORTHOPAEDIC SURGERY

## 2020-10-23 PROCEDURE — 25000003 PHARM REV CODE 250: Performed by: NURSE ANESTHETIST, CERTIFIED REGISTERED

## 2020-10-23 PROCEDURE — 71000016 HC POSTOP RECOV ADDL HR: Performed by: ORTHOPAEDIC SURGERY

## 2020-10-23 RX ORDER — CEFAZOLIN SODIUM 2 G/50ML
2 SOLUTION INTRAVENOUS
Status: COMPLETED | OUTPATIENT
Start: 2020-10-23 | End: 2020-10-23

## 2020-10-23 RX ORDER — EPHEDRINE SULFATE 50 MG/ML
INJECTION, SOLUTION INTRAVENOUS
Status: DISCONTINUED | OUTPATIENT
Start: 2020-10-23 | End: 2020-10-23

## 2020-10-23 RX ORDER — PROPOFOL 10 MG/ML
VIAL (ML) INTRAVENOUS
Status: DISCONTINUED | OUTPATIENT
Start: 2020-10-23 | End: 2020-10-23

## 2020-10-23 RX ORDER — SODIUM CHLORIDE 0.9 % (FLUSH) 0.9 %
3 SYRINGE (ML) INJECTION
Status: DISCONTINUED | OUTPATIENT
Start: 2020-10-23 | End: 2020-10-23 | Stop reason: HOSPADM

## 2020-10-23 RX ORDER — HYDROCODONE BITARTRATE AND ACETAMINOPHEN 5; 325 MG/1; MG/1
1 TABLET ORAL EVERY 4 HOURS PRN
Qty: 20 TABLET | Refills: 0 | Status: SHIPPED | OUTPATIENT
Start: 2020-10-23 | End: 2022-12-13

## 2020-10-23 RX ORDER — ALBUTEROL SULFATE 2.5 MG/.5ML
2.5 SOLUTION RESPIRATORY (INHALATION) EVERY 4 HOURS PRN
Status: DISCONTINUED | OUTPATIENT
Start: 2020-10-23 | End: 2020-10-23 | Stop reason: HOSPADM

## 2020-10-23 RX ORDER — OXYCODONE HYDROCHLORIDE 5 MG/1
5 TABLET ORAL
Status: DISCONTINUED | OUTPATIENT
Start: 2020-10-23 | End: 2020-10-23 | Stop reason: HOSPADM

## 2020-10-23 RX ORDER — BUPIVACAINE HYDROCHLORIDE AND EPINEPHRINE 2.5; 5 MG/ML; UG/ML
INJECTION, SOLUTION EPIDURAL; INFILTRATION; INTRACAUDAL; PERINEURAL
Status: DISCONTINUED | OUTPATIENT
Start: 2020-10-23 | End: 2020-10-23 | Stop reason: HOSPADM

## 2020-10-23 RX ORDER — LIDOCAINE HYDROCHLORIDE 20 MG/ML
INJECTION INTRAVENOUS
Status: DISCONTINUED | OUTPATIENT
Start: 2020-10-23 | End: 2020-10-23

## 2020-10-23 RX ORDER — FENTANYL CITRATE 50 UG/ML
INJECTION, SOLUTION INTRAMUSCULAR; INTRAVENOUS
Status: DISCONTINUED | OUTPATIENT
Start: 2020-10-23 | End: 2020-10-23

## 2020-10-23 RX ORDER — HYDROCODONE BITARTRATE AND ACETAMINOPHEN 5; 325 MG/1; MG/1
1 TABLET ORAL EVERY 4 HOURS PRN
Status: DISCONTINUED | OUTPATIENT
Start: 2020-10-23 | End: 2020-10-23 | Stop reason: HOSPADM

## 2020-10-23 RX ORDER — ONDANSETRON 2 MG/ML
4 INJECTION INTRAMUSCULAR; INTRAVENOUS DAILY PRN
Status: DISCONTINUED | OUTPATIENT
Start: 2020-10-23 | End: 2020-10-23 | Stop reason: HOSPADM

## 2020-10-23 RX ORDER — LIDOCAINE HYDROCHLORIDE 10 MG/ML
0.5 INJECTION, SOLUTION EPIDURAL; INFILTRATION; INTRACAUDAL; PERINEURAL ONCE
Status: DISCONTINUED | OUTPATIENT
Start: 2020-10-23 | End: 2020-10-23 | Stop reason: HOSPADM

## 2020-10-23 RX ORDER — PREGABALIN 50 MG/1
50 CAPSULE ORAL
Status: COMPLETED | OUTPATIENT
Start: 2020-10-23 | End: 2020-10-23

## 2020-10-23 RX ORDER — HYDROMORPHONE HYDROCHLORIDE 2 MG/ML
0.4 INJECTION, SOLUTION INTRAMUSCULAR; INTRAVENOUS; SUBCUTANEOUS EVERY 5 MIN PRN
Status: DISCONTINUED | OUTPATIENT
Start: 2020-10-23 | End: 2020-10-23 | Stop reason: HOSPADM

## 2020-10-23 RX ORDER — MIDAZOLAM HYDROCHLORIDE 1 MG/ML
INJECTION INTRAMUSCULAR; INTRAVENOUS
Status: DISCONTINUED | OUTPATIENT
Start: 2020-10-23 | End: 2020-10-23

## 2020-10-23 RX ORDER — SODIUM CHLORIDE, SODIUM LACTATE, POTASSIUM CHLORIDE, CALCIUM CHLORIDE 600; 310; 30; 20 MG/100ML; MG/100ML; MG/100ML; MG/100ML
INJECTION, SOLUTION INTRAVENOUS CONTINUOUS
Status: DISCONTINUED | OUTPATIENT
Start: 2020-10-23 | End: 2020-10-23 | Stop reason: HOSPADM

## 2020-10-23 RX ORDER — ALBUTEROL SULFATE 2.5 MG/.5ML
SOLUTION RESPIRATORY (INHALATION)
Status: DISCONTINUED
Start: 2020-10-23 | End: 2020-10-23 | Stop reason: HOSPADM

## 2020-10-23 RX ORDER — ACETAMINOPHEN 500 MG
1000 TABLET ORAL
Status: COMPLETED | OUTPATIENT
Start: 2020-10-23 | End: 2020-10-23

## 2020-10-23 RX ORDER — ONDANSETRON 2 MG/ML
INJECTION INTRAMUSCULAR; INTRAVENOUS
Status: DISCONTINUED | OUTPATIENT
Start: 2020-10-23 | End: 2020-10-23

## 2020-10-23 RX ORDER — MEPERIDINE HYDROCHLORIDE 25 MG/ML
12.5 INJECTION INTRAMUSCULAR; INTRAVENOUS; SUBCUTANEOUS ONCE AS NEEDED
Status: DISCONTINUED | OUTPATIENT
Start: 2020-10-23 | End: 2020-10-23 | Stop reason: HOSPADM

## 2020-10-23 RX ADMIN — HYDROMORPHONE HYDROCHLORIDE 0.4 MG: 2 INJECTION INTRAMUSCULAR; INTRAVENOUS; SUBCUTANEOUS at 10:10

## 2020-10-23 RX ADMIN — EPHEDRINE SULFATE 10 MG: 50 INJECTION INTRAVENOUS at 09:10

## 2020-10-23 RX ADMIN — LIDOCAINE HYDROCHLORIDE 50 MG: 20 INJECTION, SOLUTION INTRAVENOUS at 09:10

## 2020-10-23 RX ADMIN — CEFAZOLIN SODIUM 2 G: 2 SOLUTION INTRAVENOUS at 09:10

## 2020-10-23 RX ADMIN — FENTANYL CITRATE 100 MCG: 50 INJECTION, SOLUTION INTRAMUSCULAR; INTRAVENOUS at 09:10

## 2020-10-23 RX ADMIN — ONDANSETRON HYDROCHLORIDE 4 MG: 2 INJECTION INTRAMUSCULAR; INTRAVENOUS at 10:10

## 2020-10-23 RX ADMIN — PREGABALIN 50 MG: 50 CAPSULE ORAL at 08:10

## 2020-10-23 RX ADMIN — ACETAMINOPHEN 1000 MG: 500 TABLET, FILM COATED ORAL at 08:10

## 2020-10-23 RX ADMIN — MIDAZOLAM HYDROCHLORIDE 2 MG: 1 INJECTION, SOLUTION INTRAMUSCULAR; INTRAVENOUS at 09:10

## 2020-10-23 RX ADMIN — FENTANYL CITRATE 50 MCG: 50 INJECTION, SOLUTION INTRAMUSCULAR; INTRAVENOUS at 10:10

## 2020-10-23 RX ADMIN — PROPOFOL 200 MG: 10 INJECTION, EMULSION INTRAVENOUS at 09:10

## 2020-10-23 RX ADMIN — EPHEDRINE SULFATE 10 MG: 50 INJECTION INTRAVENOUS at 10:10

## 2020-10-23 RX ADMIN — OXYCODONE 5 MG: 5 TABLET ORAL at 11:10

## 2020-10-23 RX ADMIN — SODIUM CHLORIDE, SODIUM LACTATE, POTASSIUM CHLORIDE, AND CALCIUM CHLORIDE: 600; 310; 30; 20 INJECTION, SOLUTION INTRAVENOUS at 08:10

## 2020-10-23 RX ADMIN — ALBUTEROL SULFATE 2.5 MG: 2.5 SOLUTION RESPIRATORY (INHALATION) at 11:10

## 2020-10-23 RX ADMIN — CARBOXYMETHYLCELLULOSE SODIUM 2 DROP: 2.5 SOLUTION/ DROPS OPHTHALMIC at 09:10

## 2020-10-23 NOTE — TRANSFER OF CARE
"Anesthesia Transfer of Care Note    Patient: Junior Beatty    Procedure(s) Performed: Procedure(s) (LRB):  TRANSPOSITION, NERVE, ULNAR (Left)    Patient location: PACU    Anesthesia Type: general    Transport from OR: Transported from OR on 2-3 L/min O2 by NC with adequate spontaneous ventilation    Post pain: adequate analgesia    Post assessment: no apparent anesthetic complications and tolerated procedure well    Post vital signs: stable    Level of consciousness: awake, alert and oriented    Nausea/Vomiting: no nausea/vomiting    Complications: none    Transfer of care protocol was followed      Last vitals:   Visit Vitals  /82   Pulse 68   Temp 36.5 °C (97.7 °F) (Oral)   Resp 18   Ht 5' 5.5" (1.664 m)   Wt 97.5 kg (215 lb)   SpO2 99%   BMI 35.23 kg/m²     "

## 2020-10-23 NOTE — ANESTHESIA POSTPROCEDURE EVALUATION
Anesthesia Post Evaluation    Patient: Junior Beatty    Procedure(s) Performed: Procedure(s) (LRB):  TRANSPOSITION, NERVE, ULNAR (Left)    Final Anesthesia Type: general    Patient location during evaluation: PACU  Patient participation: Yes- Able to Participate  Level of consciousness: awake and alert  Post-procedure vital signs: reviewed and stable  Pain management: adequate  Airway patency: patent    PONV status at discharge: No PONV  Anesthetic complications: no      Cardiovascular status: blood pressure returned to baseline  Respiratory status: unassisted and room air  Hydration status: euvolemic  Follow-up not needed.          Vitals Value Taken Time   /61 10/23/20 1217   Temp 36.3 °C (97.4 °F) 10/23/20 1145   Pulse 73 10/23/20 1217   Resp 18 10/23/20 1217   SpO2 96 % 10/23/20 1217         Event Time   Out of Recovery 12:12:00         Pain/Jimmy Score: Pain Rating Prior to Med Admin: 6 (10/23/2020 11:08 AM)  Pain Rating Post Med Admin: 4 (10/23/2020 12:17 PM)  Jimmy Score: 10 (10/23/2020 12:17 PM)

## 2020-10-23 NOTE — OP NOTE
Surgery Date: 10/23/2020  Patient Name: Junior Beatty  CSN: [id number]  Surgeon(s) and Role:    Claude S. 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  Over the last 20 years he has  had pain and numbness mostly to the ring and small which has been unrelieved fingers with conservative measures.  Symptoms have been worse progressively over the last 2 years and he is now noting some worsening of his dexterity.  Electrical studies have demonstrated delay of the conduction velocity of the ulnar nerve at the cubital tunnel. After the potential benefits as well as potential risks and   complications of operative decompression and transposition of the ulnar nerve were reviewed with   the patient, she has elected to undergo the above procedure.     PROCEDURE IN DETAIL: After proper informed consent, the patient was transported   to the Operating Suite. The left hand was thoroughly prepped with alcohol,   Betadine and draped in the usual sterile fashion. Preoperative routine timeout   was taken, and the operative site was identified by the operative team. After   Esmarch exsanguination, a sterile padded upper arm tourniquet was then elevated to 250   mmHg. An incision was then made longitudinal along the posterior medial aspect of the left elbow.  Careful dissection was carried down through the subcutaneous tissue using bipolar cautery for hemostasis.  The ulnar nerve was identified distally between the heads of the flexor carpi ulnaris and the fascia was divided into the distal forearm.  The ulnar nerve was inspected and  fully decompressed from distal to proximal using 3.5 mm loupe magnification.  The nerve took a torturous turn near the medial epicondyle and had significant compression at this level.  Layton fascia was divided and the fascia was divided proximal to the medial epicondyle approximately 10 cm.  The medial intermuscular septum was divided and excised.  Hemostasis was confirmed using bipolar cautery.  The nerve was then mobilized along with the vasculature anterior and subcutaneous.  A fascial flap was then used and secured with 3 0 Vicryl interrupted suture to prevent any recurrence subluxation.  The nerve was inspected and found to have no points of compression distally or proximally and was laying nicely within the subcutaneous fat.  Tourniquet was deflated and hemostasis was confirmed. No masses or other pathology was noted.  The incision was then closed with Monocryl and subcuticular 4 0 Prolene suture and Dermabond skin adherent.  A sterile soft dressing was then applied. The patient was awakened in the operative suite and taken to the recovery area in stable condition. The procedure was tolerated very well.  Lap instrument and needle counts were correct.        COMPLICATIONS: None.      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        CONTINUE taking these medications    acetaminophen 325 MG tablet  Commonly known as: TYLENOL  Take 2 tablets (650 mg total) by mouth every 6 (six) hours as needed.     aspirin 81 MG Chew  Take 81 mg by mouth once daily.     atorvastatin 20 MG tablet  Commonly known as: LIPITOR  Take 1 tablet (20 mg total) by mouth once daily.     b complex vitamins capsule  Take 1 capsule by mouth once daily.     CALCIUM 600 + D(3) 600 mg(1,500mg) -200 unit Tab  Generic drug: calcium-vitamin D3  Take 1 tablet by mouth once.     FLAXSEED ORAL  Take by mouth.     garlic 1,000 mg Cap  Take by mouth.     losartan 100 MG tablet  Commonly known as: COZAAR  Take 1 tablet (100 mg total) by mouth once daily.     OMEGA 3-6-9 ORAL  Take 1,000 Units by mouth.     potassium 99 mg Tab  Take by mouth once.     trandolapriL 2 MG Tab  Commonly known as: MAVIK  Take 1 mg by mouth once daily.     verapamiL 180 MG CR tablet  Commonly known as: CALAN-SR  Take 1 tablet (180 mg total) by mouth once daily.     VITAMIN A ORAL  Take by mouth.     VITAMIN C 1000 MG tablet  Generic drug: ascorbic acid (vitamin C)  Take 1,000 mg by mouth once daily.     VITAMIN D3 1000 units Tab  Generic drug: vitamin D  Take 1,000 Units by mouth once daily. Takes 125 mcg     VITAMIN E ACETATE ORAL  Take by mouth. Takes 180mg          Discharge Procedure Orders   COVID-19 Routine Screening   Standing Status: Future Number of Occurrences: 1 Standing Exp. Date: 12/12/21   Order Comments: surg 10/23     Order Specific Question Answer Comments   Is the patient symptomatic? No    Is this needed for pre-procedure or pre-op testing? Yes    Diagnosis: Pre-op testing [202484]      Diet general     Call MD for:  temperature >100.4     Call MD for:  persistent nausea and vomiting     Call MD for:  severe uncontrolled pain     Call MD for:  difficulty breathing, headache or visual disturbances     Call MD for:  redness, tenderness, or signs of  infection (pain, swelling, redness, odor or green/yellow discharge around incision site)     Call MD for:  hives     Call MD for:  persistent dizziness or light-headedness     Call MD for:  extreme fatigue     Leave dressing on - Keep it clean, dry, and intact until clinic visit     Keep surgical extremity elevated     Lifting restrictions     No driving, operating heavy equipment or signing legal documents while taking pain medication     Follow-up Information     Claude S. Williams Iv, MD In 1 week.    Specialty: Orthopedic Surgery  Why: For wound re-check  Contact information:  [address]  [facility] [id number] [phone]

## 2020-10-23 NOTE — INTERVAL H&P NOTE
The patient has been examined and the H&P has been reviewed:    I concur with the findings and no changes have occurred since H&P was written.    Surgery risks, benefits and alternative options discussed and understood by patient/family.          Active Hospital Problems    Diagnosis  POA    Pre-op testing [Z01.818]  Not Applicable      Resolved Hospital Problems   No resolved problems to display.

## 2020-10-23 NOTE — ANESTHESIA PROCEDURE NOTES
Intubation  Performed by: Cinda Merchant CRNA  Authorized by: Usman Kelly MD     Intubation:     Induction:  Intravenous    Intubated:  Postinduction    Mask Ventilation:  Easy mask    Attempts:  1    Attempted By:  CRNA    Difficult Airway Encountered?: No      Complications:  None    Airway Device:  Supraglottic airway/LMA    Airway Device Size:  5.0    Style/Cuff Inflation:  Cuffed (inflated to minimal occlusive pressure)    Inflation Amount (mL):  10    Secured at:  The lips    Placement Verified By:  Capnometry    Complicating Factors:  None    Findings Post-Intubation:  BS equal bilateral and atraumatic/condition of teeth unchanged

## 2020-12-22 ENCOUNTER — OFFICE VISIT (OUTPATIENT)
Dept: URGENT CARE | Facility: CLINIC | Age: 60
End: 2020-12-22
Payer: COMMERCIAL

## 2020-12-22 ENCOUNTER — HOSPITAL ENCOUNTER (OUTPATIENT)
Facility: HOSPITAL | Age: 60
Discharge: HOME | End: 2020-12-24
Attending: EMERGENCY MEDICINE | Admitting: HOSPITALIST
Payer: COMMERCIAL

## 2020-12-22 VITALS
WEIGHT: 210 LBS | BODY MASS INDEX: 33.75 KG/M2 | OXYGEN SATURATION: 97 % | DIASTOLIC BLOOD PRESSURE: 99 MMHG | TEMPERATURE: 99 F | SYSTOLIC BLOOD PRESSURE: 153 MMHG | HEART RATE: 88 BPM | HEIGHT: 66 IN

## 2020-12-22 DIAGNOSIS — I48.91 ATRIAL FIBRILLATION WITH RVR: ICD-10-CM

## 2020-12-22 DIAGNOSIS — U07.1 PNEUMONIA DUE TO COVID-19 VIRUS: Primary | ICD-10-CM

## 2020-12-22 DIAGNOSIS — J06.9 UPPER RESPIRATORY TRACT INFECTION DUE TO COVID-19 VIRUS: ICD-10-CM

## 2020-12-22 DIAGNOSIS — J12.82 PNEUMONIA DUE TO COVID-19 VIRUS: Primary | ICD-10-CM

## 2020-12-22 DIAGNOSIS — R06.09 DYSPNEA ON EXERTION: ICD-10-CM

## 2020-12-22 DIAGNOSIS — Z03.89 RULED OUT FOR MYOCARDIAL INFARCTION: ICD-10-CM

## 2020-12-22 DIAGNOSIS — Z11.9 SCREENING EXAMINATION FOR UNSPECIFIED INFECTIOUS DISEASE: Primary | ICD-10-CM

## 2020-12-22 DIAGNOSIS — U07.1 UPPER RESPIRATORY TRACT INFECTION DUE TO COVID-19 VIRUS: ICD-10-CM

## 2020-12-22 DIAGNOSIS — I49.9 IRREGULAR HEART BEAT: ICD-10-CM

## 2020-12-22 PROBLEM — E78.5 HYPERLIPIDEMIA: Chronic | Status: ACTIVE | Noted: 2019-06-27

## 2020-12-22 PROBLEM — I48.0 PAROXYSMAL ATRIAL FIBRILLATION WITH RAPID VENTRICULAR RESPONSE: Chronic | Status: ACTIVE | Noted: 2019-06-07

## 2020-12-22 PROBLEM — Z96.641 PRESENCE OF RIGHT ARTIFICIAL HIP JOINT: Chronic | Status: ACTIVE | Noted: 2019-05-29

## 2020-12-22 PROBLEM — M19.90 OSTEOARTHRITIS: Chronic | Status: ACTIVE | Noted: 2019-06-27

## 2020-12-22 PROBLEM — Z01.818 PRE-OP TESTING: Status: RESOLVED | Noted: 2020-10-23 | Resolved: 2020-12-22

## 2020-12-22 PROBLEM — I10 ESSENTIAL HYPERTENSION: Chronic | Status: ACTIVE | Noted: 2019-06-27

## 2020-12-22 PROBLEM — Q74.3 ARTHROGRYPOSIS MULTIPLEX CONGENITA: Chronic | Status: ACTIVE | Noted: 2019-06-27

## 2020-12-22 LAB
ALBUMIN SERPL BCP-MCNC: 3.7 G/DL (ref 3.5–5.2)
ALP SERPL-CCNC: 129 U/L (ref 55–135)
ALT SERPL W/O P-5'-P-CCNC: 78 U/L (ref 10–44)
ANION GAP SERPL CALC-SCNC: 12 MMOL/L (ref 8–16)
APTT BLDCRRT: 29.1 SEC (ref 21–32)
AST SERPL-CCNC: 83 U/L (ref 10–40)
BASOPHILS # BLD AUTO: 0.02 K/UL (ref 0–0.2)
BASOPHILS NFR BLD: 0.3 % (ref 0–1.9)
BILIRUB SERPL-MCNC: 0.7 MG/DL (ref 0.1–1)
BNP SERPL-MCNC: 45 PG/ML (ref 0–99)
BUN SERPL-MCNC: 10 MG/DL (ref 6–20)
CALCIUM SERPL-MCNC: 8.7 MG/DL (ref 8.7–10.5)
CHLORIDE SERPL-SCNC: 107 MMOL/L (ref 95–110)
CK SERPL-CCNC: 606 U/L (ref 20–200)
CO2 SERPL-SCNC: 21 MMOL/L (ref 23–29)
CREAT SERPL-MCNC: 0.7 MG/DL (ref 0.5–1.4)
CRP SERPL-MCNC: 35.6 MG/L (ref 0–8.2)
CTP QC/QA: YES
D DIMER PPP IA.FEU-MCNC: 1 MG/L FEU
DIFFERENTIAL METHOD: ABNORMAL
EOSINOPHIL # BLD AUTO: 0 K/UL (ref 0–0.5)
EOSINOPHIL NFR BLD: 0.6 % (ref 0–8)
ERYTHROCYTE [DISTWIDTH] IN BLOOD BY AUTOMATED COUNT: 13.6 % (ref 11.5–14.5)
EST. GFR  (AFRICAN AMERICAN): >60 ML/MIN/1.73 M^2
EST. GFR  (NON AFRICAN AMERICAN): >60 ML/MIN/1.73 M^2
FERRITIN SERPL-MCNC: 2507 NG/ML (ref 20–300)
GLUCOSE SERPL-MCNC: 100 MG/DL (ref 70–110)
HCT VFR BLD AUTO: 48.8 % (ref 40–54)
HGB BLD-MCNC: 16.5 G/DL (ref 14–18)
IMM GRANULOCYTES # BLD AUTO: 0.02 K/UL (ref 0–0.04)
IMM GRANULOCYTES NFR BLD AUTO: 0.3 % (ref 0–0.5)
INR PPP: 0.9 (ref 0.8–1.2)
LACTATE SERPL-SCNC: 1.5 MMOL/L (ref 0.5–2.2)
LDH SERPL L TO P-CCNC: 465 U/L (ref 110–260)
LYMPHOCYTES # BLD AUTO: 1.7 K/UL (ref 1–4.8)
LYMPHOCYTES NFR BLD: 26.7 % (ref 18–48)
MCH RBC QN AUTO: 28.6 PG (ref 27–31)
MCHC RBC AUTO-ENTMCNC: 33.8 G/DL (ref 32–36)
MCV RBC AUTO: 85 FL (ref 82–98)
MONOCYTES # BLD AUTO: 0.5 K/UL (ref 0.3–1)
MONOCYTES NFR BLD: 7.5 % (ref 4–15)
NEUTROPHILS # BLD AUTO: 4.1 K/UL (ref 1.8–7.7)
NEUTROPHILS NFR BLD: 64.6 % (ref 38–73)
NRBC BLD-RTO: 0 /100 WBC
PLATELET # BLD AUTO: 214 K/UL (ref 150–350)
PMV BLD AUTO: 9.1 FL (ref 9.2–12.9)
POTASSIUM SERPL-SCNC: 3.6 MMOL/L (ref 3.5–5.1)
PROT SERPL-MCNC: 7.4 G/DL (ref 6–8.4)
PROTHROMBIN TIME: 10.3 SEC (ref 9–12.5)
RBC # BLD AUTO: 5.77 M/UL (ref 4.6–6.2)
SARS-COV-2 RDRP RESP QL NAA+PROBE: POSITIVE
SODIUM SERPL-SCNC: 140 MMOL/L (ref 136–145)
TROPONIN I SERPL DL<=0.01 NG/ML-MCNC: 0.02 NG/ML (ref 0–0.03)
TSH SERPL DL<=0.005 MIU/L-ACNC: 0.66 UIU/ML (ref 0.4–4)
WBC # BLD AUTO: 6.4 K/UL (ref 3.9–12.7)

## 2020-12-22 PROCEDURE — 99205 OFFICE O/P NEW HI 60 MIN: CPT | Mod: S$GLB,,, | Performed by: PHYSICIAN ASSISTANT

## 2020-12-22 PROCEDURE — 93005 ELECTROCARDIOGRAM TRACING: CPT | Mod: S$GLB,,, | Performed by: PHYSICIAN ASSISTANT

## 2020-12-22 PROCEDURE — 83880 ASSAY OF NATRIURETIC PEPTIDE: CPT

## 2020-12-22 PROCEDURE — 85379 FIBRIN DEGRADATION QUANT: CPT

## 2020-12-22 PROCEDURE — 71046 X-RAY EXAM CHEST 2 VIEWS: CPT | Mod: FY,S$GLB,, | Performed by: RADIOLOGY

## 2020-12-22 PROCEDURE — 96375 TX/PRO/DX INJ NEW DRUG ADDON: CPT

## 2020-12-22 PROCEDURE — 63600175 PHARM REV CODE 636 W HCPCS: Performed by: EMERGENCY MEDICINE

## 2020-12-22 PROCEDURE — 93005 EKG 12-LEAD: ICD-10-PCS | Mod: S$GLB,,, | Performed by: PHYSICIAN ASSISTANT

## 2020-12-22 PROCEDURE — 99219 PR INITIAL OBSERVATION CARE,LEVL II: CPT | Mod: ,,, | Performed by: HOSPITALIST

## 2020-12-22 PROCEDURE — 82728 ASSAY OF FERRITIN: CPT

## 2020-12-22 PROCEDURE — 86140 C-REACTIVE PROTEIN: CPT

## 2020-12-22 PROCEDURE — 80053 COMPREHEN METABOLIC PANEL: CPT

## 2020-12-22 PROCEDURE — 84443 ASSAY THYROID STIM HORMONE: CPT

## 2020-12-22 PROCEDURE — 96365 THER/PROPH/DIAG IV INF INIT: CPT

## 2020-12-22 PROCEDURE — 25000003 PHARM REV CODE 250: Performed by: EMERGENCY MEDICINE

## 2020-12-22 PROCEDURE — 99291 PR CRITICAL CARE, E/M 30-74 MINUTES: ICD-10-PCS | Mod: ,,, | Performed by: EMERGENCY MEDICINE

## 2020-12-22 PROCEDURE — 94761 N-INVAS EAR/PLS OXIMETRY MLT: CPT

## 2020-12-22 PROCEDURE — 71046 XR CHEST PA AND LATERAL: ICD-10-PCS | Mod: FY,S$GLB,, | Performed by: RADIOLOGY

## 2020-12-22 PROCEDURE — 85730 THROMBOPLASTIN TIME PARTIAL: CPT

## 2020-12-22 PROCEDURE — 83615 LACTATE (LD) (LDH) ENZYME: CPT

## 2020-12-22 PROCEDURE — U0002: ICD-10-PCS | Mod: QW,S$GLB,, | Performed by: PHYSICIAN ASSISTANT

## 2020-12-22 PROCEDURE — 99205 PR OFFICE/OUTPT VISIT, NEW, LEVL V, 60-74 MIN: ICD-10-PCS | Mod: S$GLB,,, | Performed by: PHYSICIAN ASSISTANT

## 2020-12-22 PROCEDURE — 96366 THER/PROPH/DIAG IV INF ADDON: CPT

## 2020-12-22 PROCEDURE — 85610 PROTHROMBIN TIME: CPT

## 2020-12-22 PROCEDURE — 63600175 PHARM REV CODE 636 W HCPCS: Performed by: HOSPITALIST

## 2020-12-22 PROCEDURE — 96372 THER/PROPH/DIAG INJ SC/IM: CPT

## 2020-12-22 PROCEDURE — 93010 ELECTROCARDIOGRAM REPORT: CPT | Mod: S$GLB,,, | Performed by: INTERNAL MEDICINE

## 2020-12-22 PROCEDURE — 99291 CRITICAL CARE FIRST HOUR: CPT | Mod: 25

## 2020-12-22 PROCEDURE — 82550 ASSAY OF CK (CPK): CPT

## 2020-12-22 PROCEDURE — 85025 COMPLETE CBC W/AUTO DIFF WBC: CPT

## 2020-12-22 PROCEDURE — 99291 CRITICAL CARE FIRST HOUR: CPT | Mod: ,,, | Performed by: EMERGENCY MEDICINE

## 2020-12-22 PROCEDURE — G0378 HOSPITAL OBSERVATION PER HR: HCPCS

## 2020-12-22 PROCEDURE — U0002 COVID-19 LAB TEST NON-CDC: HCPCS | Mod: QW,S$GLB,, | Performed by: PHYSICIAN ASSISTANT

## 2020-12-22 PROCEDURE — 83605 ASSAY OF LACTIC ACID: CPT

## 2020-12-22 PROCEDURE — 84484 ASSAY OF TROPONIN QUANT: CPT

## 2020-12-22 PROCEDURE — 93010 EKG 12-LEAD: ICD-10-PCS | Mod: S$GLB,,, | Performed by: INTERNAL MEDICINE

## 2020-12-22 PROCEDURE — 93010 EKG 12-LEAD: ICD-10-PCS | Mod: ,,, | Performed by: INTERNAL MEDICINE

## 2020-12-22 PROCEDURE — 93010 ELECTROCARDIOGRAM REPORT: CPT | Mod: ,,, | Performed by: INTERNAL MEDICINE

## 2020-12-22 PROCEDURE — 25000003 PHARM REV CODE 250: Performed by: HOSPITALIST

## 2020-12-22 PROCEDURE — 99219 PR INITIAL OBSERVATION CARE,LEVL II: ICD-10-PCS | Mod: ,,, | Performed by: HOSPITALIST

## 2020-12-22 PROCEDURE — 93005 ELECTROCARDIOGRAM TRACING: CPT

## 2020-12-22 RX ORDER — GUAIFENESIN/DEXTROMETHORPHAN 100-10MG/5
10 SYRUP ORAL EVERY 4 HOURS PRN
Status: DISCONTINUED | OUTPATIENT
Start: 2020-12-22 | End: 2020-12-24 | Stop reason: HOSPADM

## 2020-12-22 RX ORDER — OXYCODONE AND ACETAMINOPHEN 5; 325 MG/1; MG/1
1 TABLET ORAL EVERY 4 HOURS PRN
Status: DISCONTINUED | OUTPATIENT
Start: 2020-12-22 | End: 2020-12-24 | Stop reason: HOSPADM

## 2020-12-22 RX ORDER — CHOLECALCIFEROL (VITAMIN D3) 25 MCG
1000 TABLET ORAL DAILY
Status: DISCONTINUED | OUTPATIENT
Start: 2020-12-22 | End: 2020-12-22

## 2020-12-22 RX ORDER — ASPIRIN 325 MG
325 TABLET ORAL
Status: SHIPPED | OUTPATIENT
Start: 2020-12-22

## 2020-12-22 RX ORDER — ENOXAPARIN SODIUM 100 MG/ML
40 INJECTION SUBCUTANEOUS
Status: DISCONTINUED | OUTPATIENT
Start: 2020-12-22 | End: 2020-12-22

## 2020-12-22 RX ORDER — ASCORBIC ACID 500 MG
500 TABLET ORAL 2 TIMES DAILY
Status: DISCONTINUED | OUTPATIENT
Start: 2020-12-22 | End: 2020-12-24 | Stop reason: HOSPADM

## 2020-12-22 RX ORDER — CALCIUM CARBONATE 200(500)MG
500 TABLET,CHEWABLE ORAL DAILY PRN
Status: DISCONTINUED | OUTPATIENT
Start: 2020-12-22 | End: 2020-12-24 | Stop reason: HOSPADM

## 2020-12-22 RX ORDER — CEFTRIAXONE 1 G/1
1 INJECTION, POWDER, FOR SOLUTION INTRAMUSCULAR; INTRAVENOUS
Status: COMPLETED | OUTPATIENT
Start: 2020-12-22 | End: 2020-12-22

## 2020-12-22 RX ORDER — ONDANSETRON 8 MG/1
8 TABLET, ORALLY DISINTEGRATING ORAL EVERY 8 HOURS PRN
Status: DISCONTINUED | OUTPATIENT
Start: 2020-12-22 | End: 2020-12-24 | Stop reason: HOSPADM

## 2020-12-22 RX ORDER — HEPARIN SODIUM,PORCINE/D5W 25000/250
0-40 INTRAVENOUS SOLUTION INTRAVENOUS CONTINUOUS
Status: DISCONTINUED | OUTPATIENT
Start: 2020-12-22 | End: 2020-12-22

## 2020-12-22 RX ORDER — BENZONATATE 100 MG/1
100 CAPSULE ORAL 3 TIMES DAILY PRN
Status: DISCONTINUED | OUTPATIENT
Start: 2020-12-22 | End: 2020-12-24 | Stop reason: HOSPADM

## 2020-12-22 RX ORDER — DEXAMETHASONE SODIUM PHOSPHATE 4 MG/ML
6 INJECTION, SOLUTION INTRA-ARTICULAR; INTRALESIONAL; INTRAMUSCULAR; INTRAVENOUS; SOFT TISSUE
Status: COMPLETED | OUTPATIENT
Start: 2020-12-22 | End: 2020-12-22

## 2020-12-22 RX ORDER — CHOLECALCIFEROL (VITAMIN D3) 25 MCG
1000 TABLET ORAL DAILY
Status: DISCONTINUED | OUTPATIENT
Start: 2020-12-22 | End: 2020-12-24 | Stop reason: HOSPADM

## 2020-12-22 RX ORDER — ENOXAPARIN SODIUM 100 MG/ML
1 INJECTION SUBCUTANEOUS
Status: DISCONTINUED | OUTPATIENT
Start: 2020-12-22 | End: 2020-12-24 | Stop reason: HOSPADM

## 2020-12-22 RX ORDER — ACETAMINOPHEN 325 MG/1
650 TABLET ORAL EVERY 4 HOURS PRN
Status: DISCONTINUED | OUTPATIENT
Start: 2020-12-22 | End: 2020-12-24 | Stop reason: HOSPADM

## 2020-12-22 RX ORDER — TRANDOLAPRIL 1 MG/1
1 TABLET ORAL DAILY
Status: DISCONTINUED | OUTPATIENT
Start: 2020-12-23 | End: 2020-12-24 | Stop reason: HOSPADM

## 2020-12-22 RX ORDER — VERAPAMIL HYDROCHLORIDE 120 MG/1
120 TABLET, FILM COATED, EXTENDED RELEASE ORAL NIGHTLY
Status: DISCONTINUED | OUTPATIENT
Start: 2020-12-22 | End: 2020-12-22

## 2020-12-22 RX ORDER — LOPERAMIDE HYDROCHLORIDE 2 MG/1
2 CAPSULE ORAL 4 TIMES DAILY PRN
Status: DISCONTINUED | OUTPATIENT
Start: 2020-12-22 | End: 2020-12-24 | Stop reason: HOSPADM

## 2020-12-22 RX ORDER — VERAPAMIL HYDROCHLORIDE 180 MG/1
180 TABLET, FILM COATED, EXTENDED RELEASE ORAL NIGHTLY
Status: DISCONTINUED | OUTPATIENT
Start: 2020-12-22 | End: 2020-12-24 | Stop reason: HOSPADM

## 2020-12-22 RX ORDER — GLUCAGON 1 MG
1 KIT INJECTION
Status: DISCONTINUED | OUTPATIENT
Start: 2020-12-22 | End: 2020-12-24 | Stop reason: HOSPADM

## 2020-12-22 RX ORDER — IBUPROFEN 200 MG
24 TABLET ORAL
Status: DISCONTINUED | OUTPATIENT
Start: 2020-12-22 | End: 2020-12-24 | Stop reason: HOSPADM

## 2020-12-22 RX ORDER — ALBUTEROL SULFATE 90 UG/1
2 AEROSOL, METERED RESPIRATORY (INHALATION) EVERY 4 HOURS PRN
Status: DISCONTINUED | OUTPATIENT
Start: 2020-12-22 | End: 2020-12-24 | Stop reason: HOSPADM

## 2020-12-22 RX ORDER — IBUPROFEN 200 MG
16 TABLET ORAL
Status: DISCONTINUED | OUTPATIENT
Start: 2020-12-22 | End: 2020-12-24 | Stop reason: HOSPADM

## 2020-12-22 RX ORDER — NAPROXEN SODIUM 220 MG/1
81 TABLET, FILM COATED ORAL DAILY
Status: DISCONTINUED | OUTPATIENT
Start: 2020-12-22 | End: 2020-12-24 | Stop reason: HOSPADM

## 2020-12-22 RX ORDER — SODIUM CHLORIDE 0.9 % (FLUSH) 0.9 %
10 SYRINGE (ML) INJECTION
Status: DISCONTINUED | OUTPATIENT
Start: 2020-12-22 | End: 2020-12-24 | Stop reason: HOSPADM

## 2020-12-22 RX ORDER — MELOXICAM 7.5 MG/1
7.5 TABLET ORAL DAILY
COMMUNITY
End: 2023-05-09 | Stop reason: SDUPTHER

## 2020-12-22 RX ORDER — LOSARTAN POTASSIUM 25 MG/1
25 TABLET ORAL DAILY
Status: DISCONTINUED | OUTPATIENT
Start: 2020-12-22 | End: 2020-12-24 | Stop reason: HOSPADM

## 2020-12-22 RX ORDER — ACETAMINOPHEN 325 MG/1
650 TABLET ORAL EVERY 8 HOURS PRN
Status: DISCONTINUED | OUTPATIENT
Start: 2020-12-22 | End: 2020-12-24 | Stop reason: HOSPADM

## 2020-12-22 RX ORDER — MELOXICAM 7.5 MG/1
7.5 TABLET ORAL DAILY
Status: DISCONTINUED | OUTPATIENT
Start: 2020-12-23 | End: 2020-12-24 | Stop reason: HOSPADM

## 2020-12-22 RX ORDER — TALC
6 POWDER (GRAM) TOPICAL NIGHTLY PRN
Status: DISCONTINUED | OUTPATIENT
Start: 2020-12-22 | End: 2020-12-24 | Stop reason: HOSPADM

## 2020-12-22 RX ORDER — DILTIAZEM HYDROCHLORIDE 5 MG/ML
0.25 INJECTION INTRAVENOUS
Status: COMPLETED | OUTPATIENT
Start: 2020-12-22 | End: 2020-12-22

## 2020-12-22 RX ADMIN — GUAIFENESIN AND DEXTROMETHORPHAN 10 ML: 100; 10 SYRUP ORAL at 08:12

## 2020-12-22 RX ADMIN — VERAPAMIL HYDROCHLORIDE 180 MG: 180 TABLET, FILM COATED, EXTENDED RELEASE ORAL at 10:12

## 2020-12-22 RX ADMIN — REMDESIVIR 200 MG: 100 INJECTION, POWDER, LYOPHILIZED, FOR SOLUTION INTRAVENOUS at 08:12

## 2020-12-22 RX ADMIN — OXYCODONE HYDROCHLORIDE AND ACETAMINOPHEN 500 MG: 500 TABLET ORAL at 08:12

## 2020-12-22 RX ADMIN — AZITHROMYCIN MONOHYDRATE 500 MG: 500 INJECTION, POWDER, LYOPHILIZED, FOR SOLUTION INTRAVENOUS at 12:12

## 2020-12-22 RX ADMIN — DEXAMETHASONE SODIUM PHOSPHATE 6 MG: 4 INJECTION INTRA-ARTICULAR; INTRALESIONAL; INTRAMUSCULAR; INTRAVENOUS; SOFT TISSUE at 12:12

## 2020-12-22 RX ADMIN — ENOXAPARIN SODIUM 100 MG: 100 INJECTION SUBCUTANEOUS at 03:12

## 2020-12-22 RX ADMIN — SODIUM CHLORIDE 1000 ML: 0.9 INJECTION, SOLUTION INTRAVENOUS at 12:12

## 2020-12-22 RX ADMIN — DILTIAZEM HYDROCHLORIDE 24 MG: 5 INJECTION INTRAVENOUS at 12:12

## 2020-12-22 RX ADMIN — CEFTRIAXONE SODIUM 1 G: 1 INJECTION, POWDER, FOR SOLUTION INTRAMUSCULAR; INTRAVENOUS at 12:12

## 2020-12-22 NOTE — ED PROVIDER NOTES
Encounter Date: 12/22/2020       History     Chief Complaint   Patient presents with    Covid Positive     hx Afib    Cough     HPI   Mr. Beatty is a 60 y.o. male with arthrogryposis, hypertension, here today with cough and shortness of breath.  Reports he has had fatigue, fevers, cough and shortness of breath since 12/11/2020.  Today he went to urgent care where he was found to be in AFib with RVR.  He was diagnosed with COVID-19 at that time.  He was sent here for further evaluation.  Nothing seems to make symptoms better or worse. Denies chest pain, abdominal pain, numbness, tingling, weakness.    Review of patient's allergies indicates:   Allergen Reactions    Ultram [tramadol] Other (See Comments)     disoriented    Robaxin [methocarbamol]      Pt believes this incited A.fib    Tramadol      Other reaction(s): Dizziness     Past Medical History:   Diagnosis Date    Arthritis     Arthrogryposis     Hyperlipidemia     Hypertension      Past Surgical History:   Procedure Laterality Date    ANKLE SURGERY Left 1964    Klever procedure    APPLICATION OF SPICA CAST  1962-63    COLONOSCOPY  2014    ELBOW SURGERY Bilateral 1965    FRACTURE SURGERY Left 1966    HEMORRHOID SURGERY  2009    HIP ARTHROPLASTY Right 5/27/2019    Procedure: ARTHROPLASTY, HIP;  Surgeon: Willian Covarrubias MD;  Location: [facility] OR;  Service: Orthopedics;  Laterality: Right;  OFIRMEV    JOINT REPLACEMENT Left 1993    w/reconstruction 2002 bilateral hips    KNEE ARTHROSCOPY Right 1986    TONSILLECTOMY      ULNAR NERVE TRANSPOSITION Left 10/23/2020    Procedure: TRANSPOSITION, NERVE, ULNAR;  Surgeon: Claude S. Williams IV, MD;  Location: [facility] OR;  Service: Orthopedics;  Laterality: Left;    wrist release Bilateral 1965     History reviewed. No pertinent family history.  Social History     Tobacco Use    Smoking status: Never Smoker    Smokeless tobacco: Never Used   Substance Use Topics    Alcohol use: Yes     Comment: occasional     Drug use: Never     Review of Systems   Constitutional: Positive for fatigue and fever.   HENT: Negative for sore throat.    Respiratory: Positive for shortness of breath.    Cardiovascular: Negative for chest pain.   Gastrointestinal: Negative for nausea.   Genitourinary: Negative for dysuria.   Musculoskeletal: Negative for back pain.   Skin: Negative for rash.   Neurological: Negative for weakness.   Hematological: Does not bruise/bleed easily.       Physical Exam     Initial Vitals [12/22/20 1120]   BP Pulse Resp Temp SpO2   128/80 (!) 114 (!) 22 99 °F (37.2 °C) 98 %      MAP       --         Physical Exam    Nursing note and vitals reviewed.  Constitutional: He appears well-developed and well-nourished. He is not diaphoretic. No distress.   HENT:   Head: Normocephalic and atraumatic.   Right Ear: External ear normal.   Left Ear: External ear normal.   Neck: Neck supple.   Cardiovascular: Normal heart sounds and intact distal pulses. An irregularly irregular rhythm present.  Tachycardia present.    Pulmonary/Chest: No respiratory distress. He has no wheezes. He has no rhonchi. He has rales (Bibasilar).   Abdominal: Soft. He exhibits no distension. There is no abdominal tenderness. There is no rebound and no guarding.   Musculoskeletal: No edema.      Comments: Abnormal stature   Neurological: He is alert and oriented to person, place, and time. GCS score is 15. GCS eye subscore is 4. GCS verbal subscore is 5. GCS motor subscore is 6.   Abnormal gait.   Skin: Skin is warm. Capillary refill takes less than 2 seconds. No rash noted.   No cyanosis.  Multiple healed surgical scars present.   Psychiatric: He has a normal mood and affect.         ED Course   Procedures  Labs Reviewed   CBC W/ AUTO DIFFERENTIAL - Abnormal; Notable for the following components:       Result Value    MPV 9.1 (*)     All other components within normal limits    Narrative:     (if patient is on warfarin prior to heparin therapy)    COMPREHENSIVE METABOLIC PANEL - Abnormal; Notable for the following components:    CO2 21 (*)     AST 83 (*)     ALT 78 (*)     All other components within normal limits    Narrative:     (if patient is on warfarin prior to heparin therapy)   C-REACTIVE PROTEIN - Abnormal; Notable for the following components:    CRP 35.6 (*)     All other components within normal limits    Narrative:     (if patient is on warfarin prior to heparin therapy)   FERRITIN - Abnormal; Notable for the following components:    Ferritin 2,507 (*)     All other components within normal limits    Narrative:     (if patient is on warfarin prior to heparin therapy)   LACTATE DEHYDROGENASE - Abnormal; Notable for the following components:     (*)     All other components within normal limits    Narrative:     (if patient is on warfarin prior to heparin therapy)   CK - Abnormal; Notable for the following components:     (*)     All other components within normal limits    Narrative:     (if patient is on warfarin prior to heparin therapy)   D DIMER, QUANTITATIVE - Abnormal; Notable for the following components:    D-Dimer 1.00 (*)     All other components within normal limits    Narrative:     (if patient is on warfarin prior to heparin therapy)  ADD ON DDIMR [id number] PER DR. HANS LINO  12/22/2020  15:06   ADD ON BNP [id number] PER DR. HANS LINO  12/22/2020  15:07   add on tsh [id number] per dr. hans lino  12/22/2020  15:29    LACTIC ACID, PLASMA    Narrative:     (if patient is on warfarin prior to heparin therapy)   TROPONIN I    Narrative:     (if patient is on warfarin prior to heparin therapy)   APTT    Narrative:     (if patient is on warfarin prior to heparin therapy)   PROTIME-INR    Narrative:     (if patient is on warfarin prior to heparin therapy)   B-TYPE NATRIURETIC PEPTIDE   D DIMER, QUANTITATIVE   TSH   B-TYPE NATRIURETIC PEPTIDE    Narrative:     (if patient is on warfarin prior to heparin therapy)  ADD  ON DDIMR [id number] PER DR. HANS LINO  12/22/2020  15:06   ADD ON BNP [id number] PER DR. HANS LINO  12/22/2020  15:07   add on tsh [id number] per dr. hans lino  12/22/2020  15:29    TSH    Narrative:     (if patient is on warfarin prior to heparin therapy)  ADD ON DDIMR [id number] PER DR. HANS LINO  12/22/2020  15:06   ADD ON BNP [id number] PER DR. HANS LINO  12/22/2020  15:07   add on tsh [id number] per dr. hans lino  12/22/2020  15:29         ECG Results          EKG 12-lead (Final result)  Result time 12/22/20 16:01:34    Final result by Interface, Lab In [facility] (12/22/20 16:01:34)                 Narrative:    Test Reason : U07.1,J12.89,    Vent. Rate : 129 BPM     Atrial Rate : 156 BPM     P-R Int : 000 ms          QRS Dur : 100 ms      QT Int : 310 ms       P-R-T Axes : 000 -77 041 degrees     QTc Int : 454 ms    Atrial fibrillation with rapid ventricular response  Left axis deviation  Anteroseptal infarct (cited on or before 22-DEC-2020)  Abnormal ECG  When compared with ECG of 22-DEC-2020 10:24,  Criteria for anterior infarct now present  Confirmed by Bing Vilchis MD ([id number]) on 12/22/2020 4:01:22 PM    Referred By: [email or web address]   SELF           Confirmed By:Bing Vilchis MD                            Imaging Results    None          Medical Decision Making:   History:   Old Medical Records: I decided to obtain old medical records.  Old Records Summarized: other records.       <> Summary of Records: Urgent care today is COVID positive with AFib with RVR.  Independently Interpreted Test(s):   I have ordered and independently interpreted X-rays - see summary below.       <> Summary of X-Ray Reading(s): CXR with bilateral patchy infiltrates on my read.  I have ordered and independently interpreted EKG Reading(s) - see summary below       <> Summary of EKG Reading(s): Atrial fibrillation rate 120s to 150s. No STEMI.  Clinical Tests:   Lab Tests: Ordered and Reviewed  Radiological Study:  Reviewed  Medical Tests: Ordered and Reviewed  ED Management:  Tachycardic.  Afebrile.  Borderline hypoxic.  Here with AFib with RVR and acute COVID-19.  On ambulation, patient's O2 sats dropped to 93%.  EKG with AFib with RVR on my interpretation.  CXR from clinic with bilateral infiltrates on my read.  COVID labs obtained.  IV Decadron given.  Rocephin and azithromycin given as well for pneumonia coverage.  One dose of 0.25 mg/kg diltiazem given as patient is on verapamil at home.    Labs consistent with acute COVID-19 infection.  Heart rate seems well controlled on the 1 dose of diltiazem.  Heparin infusion started for AFib with RVR.  I suspect underlying COVID-19 infection as the cause of this episode of AFib as he states he had 1 time provoked in the past but is not currently anticoagulated.    Discussed with hospital medicine who admitted patient.      Other:   I have discussed this case with another health care provider.       <> Summary of the Discussion: Hospital medicine              Attending Attestation:         Attending Critical Care:   Critical Care Times:   ==============================================================  · Total Critical Care Time - exclusive of procedural time: 40 minutes.  ==============================================================  Critical care was necessary to treat or prevent imminent or life-threatening deterioration of the following conditions: cardiac arrhythmia.   The following critical care procedures were done by me (see procedure notes): pulse oximetry.   Critical care was time spent personally by me on the following activities: obtaining history from patient or relative, examination of patient, review of old charts, ordering lab, x-rays, and/or EKG, development of treatment plan with patient or relative, ordering and performing treatments and interventions, evaluation of patient's response to treatment, re-evaluation of patient's conition and interpretation of cardiac  measurements.   Critical Care Condition: potentially life-threatening                         Clinical Impression:     ICD-10-CM ICD-9-CM   1. Pneumonia due to COVID-19 virus  U07.1 480.8    J12.89    2. Atrial fibrillation with RVR  I48.91 427.31   3. Ruled out for myocardial infarction  Z03.89 V71.7                          ED Disposition Condition    Observation                             Tao Bar MD  12/23/20 1526

## 2020-12-22 NOTE — ED NOTES
Problem: Patient Care Overview  Goal: Plan of Care Review  Outcome: Ongoing (interventions implemented as appropriate)  Flowsheets (Taken 12/11/2019 0600)  Progress: no change  Plan of Care Reviewed With: patient  Outcome Summary: Pt denies pain during the night, reviewed pain management plan. Increased respiratory distress through the night requiring adjustment of high flow nasal cannula to 100%, deep suction x1, non-rebreather mask, lasix, and transition to BIPAP. PA at bedside to assess pt and discuss the option of utilizing BIPAP to decrease respiratory effort. Pt agreeable to try BIPAP and is alert and oriented at time of conversation. Will monitor.     Problem: Skin Injury Risk (Adult)  Goal: Identify Related Risk Factors and Signs and Symptoms  Outcome: Ongoing (interventions implemented as appropriate)  Flowsheets (Taken 12/11/2019 0813)  Related Risk Factors (Skin Injury Risk): advanced age; cognitive impairment; infection; mobility impaired     Problem: Skin Injury Risk (Adult)  Goal: Skin Health and Integrity  Outcome: Ongoing (interventions implemented as appropriate)  Flowsheets (Taken 12/11/2019 0813)  Skin Health and Integrity: making progress toward outcome     Problem: Pain, Chronic (Adult)  Goal: Identify Related Risk Factors and Signs and Symptoms  Outcome: Ongoing (interventions implemented as appropriate)  Flowsheets (Taken 12/11/2019 0600)  Related Risk Factors (Chronic Pain): disease process  Signs and Symptoms (Chronic Pain): other (see comments) (denies pain currently)         Ambulated pt on RA. Oxygen saturation 93% on RA.

## 2020-12-22 NOTE — H&P
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 Currenlty on 1 L NC. Notes fever precipitated atrial fibrillation    Review of Systems    Constitutional: Positive for fever, chills, fatigue, poor appetite   HENT: Positive for sore throat, negative for trouble swallowing.    Eyes: Negative for photophobia, visual disturbance.   Respiratory: Positive for cough, shortness of breath  Cardiovascular: Negative for chest pain, palpitations, leg swelling.   Gastrointestinal: + for diarrhea. Negative for abdominal pain, constipation, nausea, vomiting.   Endocrine: Negative for cold intolerance, heat intolerance.   Genitourinary: Negative for dysuria, frequency.   Musculoskeletal: Negative for arthralgias, myalgias.   Skin: Negative for rash  Neurological: Negative for dizziness, syncope, light-headedness.   Psychiatric/Behavioral: Negative for confusion, hallucinations, anxiety    Past Medical History:   Diagnosis Date    Arthritis     Arthrogryposis     Hyperlipidemia     Hypertension      Past Surgical History:   Procedure Laterality Date    ANKLE SURGERY Left 1964    Klever procedure    APPLICATION OF SPICA CAST  1962-63    COLONOSCOPY  2014    ELBOW SURGERY Bilateral 1965    FRACTURE SURGERY Left 1966    HEMORRHOID SURGERY  2009    HIP ARTHROPLASTY Right 5/27/2019    Procedure: ARTHROPLASTY, HIP;  Surgeon: Willian Covarrubias MD;   Location: [facility];  Service: Orthopedics;  Laterality: Right;  OFIRMEV    JOINT REPLACEMENT Left 1993    w/reconstruction 2002 bilateral hips    KNEE ARTHROSCOPY Right 1986    TONSILLECTOMY      ULNAR NERVE TRANSPOSITION Left 10/23/2020    Procedure: TRANSPOSITION, NERVE, ULNAR;  Surgeon: Claude S. Williams IV, MD;  Location: [facility];  Service: Orthopedics;  Laterality: Left;    wrist release Bilateral 1965     No family history on file.  Social History     Socioeconomic History    Marital status: Single     Spouse name: Not on file    Number of children: Not on file    Years of education: Not on file    Highest education level: Not on file   Occupational History    Not on file   Social Needs    Financial resource strain: Not on file    Food insecurity     Worry: Not on file     Inability: Not on file    Transportation needs     Medical: Not on file     Non-medical: Not on file   Tobacco Use    Smoking status: Never Smoker    Smokeless tobacco: Never Used   Substance and Sexual Activity    Alcohol use: Yes     Comment: occasional    Drug use: Never    Sexual activity: Not on file   Lifestyle    Physical activity     Days per week: Not on file     Minutes per session: Not on file    Stress: Not on file   Relationships    Social connections     Talks on phone: Not on file     Gets together: Not on file     Attends [employer] service: Not on file     Active member of club or organization: Not on file     Attends meetings of clubs or organizations: Not on file     Relationship status: Not on file   Other Topics Concern    Not on file   Social History Narrative    ** Merged History Encounter **            Medications  Current Facility-Administered Medications on File Prior to Encounter   Medication Dose Route Frequency Provider Last Rate Last Dose    aspirin tablet 325 mg  325 mg Oral 1 time in Clinic[facility]         Current Outpatient Medications on File Prior to Encounter    Medication Sig Dispense Refill    acetaminophen (TYLENOL) 325 MG tablet Take 2 tablets (650 mg total) by mouth every 6 (six) hours as needed.  0    ascorbic acid, vitamin C, (VITAMIN C) 1000 MG tablet Take 1,000 mg by mouth once daily.      aspirin 81 MG Chew Take 81 mg by mouth once daily.      atorvastatin (LIPITOR) 20 MG tablet Take 1 tablet (20 mg total) by mouth once daily. 90 tablet 3    b complex vitamins capsule Take 1 capsule by mouth once daily.      calcium-vitamin D tablet 600 mg-200 units Take 1 tablet by mouth once.      FLAXSEED ORAL Take by mouth.      garlic 1,000 mg Cap Take by mouth.      losartan (COZAAR) 100 MG tablet Take 1 tablet (100 mg total) by mouth once daily. 90 tablet 3    meloxicam (MOBIC) 7.5 MG tablet Take 7.5 mg by mouth once daily.      om 3/E/linol/ala/oleic/gla/lip (OMEGA 3-6-9 ORAL) Take 1,000 Units by mouth.      potassium 99 mg Tab Take by mouth once.      trandolapriL (MAVIK) 2 MG Tab Take 1 mg by mouth once daily.      verapamil (CALAN-SR) 180 MG CR tablet Take 1 tablet (180 mg total) by mouth once daily. 30 tablet 11    VITAMIN A ORAL Take by mouth.      vitamin D (VITAMIN D3) 1000 units Tab Take 1,000 Units by mouth once daily. Takes 125 mcg      VITAMIN E ACETATE ORAL Take by mouth. Takes 180mg      HYDROcodone-acetaminophen (NORCO) 5-325 mg per tablet Take 1 tablet by mouth every 4 (four) hours as needed for Pain. 20 tablet 0       Allergies  Ultram [tramadol], Robaxin [methocarbamol], and Tramadol    Physical Examination  Temp:  [99 °F (37.2 °C)]   Pulse:  []   Resp:  [22]   BP: (128-153)/(67-99)   SpO2:  [93 %-98 %]     Gen: NAD, conversant  Head: NC, AT  Eyes: PERRLA, EOMI  Throat: MMM, OP clear  CV: RRR, no M/R/G, no peripheral edema, no JVD  Resp: coarse bilateral breath sounds, no increased work of breathing on 1L  GI: Soft, NT, ND, +BS  Ext: MAEW, no c/c/e  Neuro: AAOx3, CN grossly intact, no focal neurologic deficits  Psychiatry: Normal  mood, normal affect    Laboratory:  Recent Labs   Lab 12/22/20  1218   WBC 6.40   LYMPH 26.7  1.7   HGB 16.5   HCT 48.8        Recent Labs   Lab 12/22/20  1218      K 3.6      CO2 21*   BUN 10   CREATININE 0.7      CALCIUM 8.7     Recent Labs   Lab 12/22/20  1218   ALKPHOS 129   ALT 78*   AST 83*   ALBUMIN 3.7   PROT 7.4   BILITOT 0.7   INR 0.9      Recent Labs     12/22/20  1218   DDIMER 1.00*   FERRITIN 2,507*   CRP 35.6*   *   TROPONINI 0.023   LACTATE 1.5       All labs within the last 24 hours were reviewed.     Microbiology:  Lab Results   Component Value Date    [id number] Positive (A) 12/22/2020       Microbiology Results (last 7 days)     ** No results found for the last 168 hours. **            Imaging      No results found for this or any previous visit.    XR CHEST PA AND LATERAL  Narrative: EXAMINATION:  XR CHEST PA AND LATERAL    CLINICAL HISTORY:  Cardiac arrhythmia, unspecified    FINDINGS:  Two views: Heart size is normal.  Lungs are clear and the bones showed DJD.  Impression: No acute process seen.    Electronically signed by: Michele Hernández MD  Date:    12/22/2020  Time:    10:15      All imaging within the last 24 hours was reviewed.       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       COVID-19 Virus Infection  Viral Pneumonia due to COVID-19  - COVID-19 testing: Collection Date:  10/20/2020 Collection Time:   7:37 AM   - Isolation: Airborne/Droplet. Surgical mask on patient. Notify Infection Control  - Diagnostics: Trend Q48hrs if stable, more frequently if patient decompensating - Management: Per [facility] COVID Treatment Protocol    - Telemetry & Continuous Pulse Oximetry    - Nutrition:        - Multivitamin PO daily       - Boost supplement       - Vitamin D 1000IU daily if deficient       - Ascorbic acid 500mg PO bid    - Supportive Care:       - acetaminophen 650mg PO Q6hr PRN fever/headache       - loperamide PRN viral diarrhea       - IVF if indicated, restrictive strategy preferred, no maintenance IV if able       - VTE PPx: enoxaparin or heparin SQ unless contraindicated    - Antibiotics: no currenlty indicated     - Remdesivir 12/22     - Dexamethasone 12/22     - Convalescent Plasma not indicated    Acute Hypoxemic Respiratory Failure    - Order RT consult via Respiratory Communication for COVID Protocols    - Incentive Spirometer Q4h, Flutter Valve Q4h    - Continuous Pulse Oximetry, goal SpO2 92-96%    - Supplemental O2 via LFNC, VentiMask, or HFNC (see Respiratory Support Oxygen Therapies)    - If wheezing, albuterol INH Q6h scheduled & PRN    - Proning Protocol if patient is a candidate (see  Proning Protocol)   - GCS >13, able to self-prone    - If deterioration, may warrant trial of NIPPV in neg pressure room or immediate ICU consult      Atrial fibrillation with RVR  - in setting of acute infection/fever  - received verapamil IV in ED and currently HR 88 -100  - otherwise hemodynamically stable  - discussed options for treatment with patient - agree for rate control overnight with verapamil and lovenox, echo when available. If no interval improvement will discuss cardioversion options with cardiology.   - Chadsvasc 1 not indicative of need for long term anticoagulation.        HTN  - patient on ace and arb - long term medication at home.     Advance Care Planning  Goals of  "care, counseling/discussion full  If patient transitions to Comfort-Focused Care, please place "Nurse Communication: End of Life Care, family members allowed to visit, including spouse/partner and adult children [please list names]. Please ask family to visit as a group and leave as a group.         VTE High Risk Prophylaxis:   VTE Risk Mitigation (From admission, onward)         Ordered     enoxaparin injection 100 mg  Every 12 hours (non-standard times)      12/22/20 1344     IP VTE HIGH RISK PATIENT  Once      12/22/20 1343     Place sequential compression device  Until discontinued      12/22/20 1343                Oralia Blair   "

## 2020-12-22 NOTE — PROGRESS NOTES
"Subjective:       Patient ID: Junior Beatty is a 60 y.o. male.    Vitals:  height is 5' 5.5" (1.664 m) and weight is 95.3 kg (210 lb). His temperature is 99 °F (37.2 °C). His blood pressure is 153/99 (abnormal) and his pulse is 88. His oxygen saturation is 97%.     Chief Complaint: Fatigue and COVID-19 Concerns    Fatigue  This is a new problem. Episode onset: 5 days ago. The problem occurs constantly. Associated symptoms include chills, coughing, fatigue, a fever and nausea. Pertinent negatives include no congestion, diaphoresis, myalgias, rash, sore throat or vomiting. Associated symptoms comments: 102.8. The symptoms are aggravated by exertion. He has tried nothing for the symptoms. The treatment provided no relief.   Pt w/ cough and fever not getting better . States today is his worse day. + Hx of HTN, HLD , PAF .     Constitution: Positive for chills, fatigue and fever. Negative for sweating.   HENT: Negative for ear pain, congestion, sinus pain, sinus pressure, sore throat and voice change.    Neck: Negative for painful lymph nodes.   Eyes: Negative for eye redness.   Respiratory: Positive for cough. Negative for chest tightness, sputum production, bloody sputum, COPD, shortness of breath, stridor, wheezing and asthma.         Bronchitis history   Gastrointestinal: Positive for nausea. Negative for vomiting.   Musculoskeletal: Negative for muscle ache.   Skin: Negative for rash.   Allergic/Immunologic: Negative for seasonal allergies and asthma.   Hematologic/Lymphatic: Negative for swollen lymph nodes.       Objective:      Physical Exam   Constitutional: He is oriented to person, place, and time. He appears well-developed. He is cooperative. He appears ill.      Comments:Pt coughing and appears that he does not feel well    HENT:   Head: Normocephalic and atraumatic.   Ears:   Right Ear: Hearing, tympanic membrane, external ear and ear canal normal.   Left Ear: Hearing, tympanic membrane, external ear and " ear canal normal.   Nose: Nose normal. No mucosal edema, rhinorrhea or nasal deformity. No epistaxis. Right sinus exhibits no maxillary sinus tenderness and no frontal sinus tenderness. Left sinus exhibits no maxillary sinus tenderness and no frontal sinus tenderness.   Mouth/Throat: Uvula is midline, oropharynx is clear and moist and mucous membranes are normal. Mucous membranes are moist. No trismus in the jaw. Normal dentition. No uvula swelling. No oropharyngeal exudate, posterior oropharyngeal edema or posterior oropharyngeal erythema. Oropharynx is clear.   Eyes: Pupils are equal, round, and reactive to light. Conjunctivae and lids are normal. No scleral icterus. extraocular movement intact  Neck: Trachea normal, full passive range of motion without pain and phonation normal. Neck supple. No neck rigidity. No edema and no erythema present.   Cardiovascular: Normal heart sounds and normal pulses. An irregular rhythm present. Exam reveals no gallop.   No murmur heard.     Comments: Irregular , Rate from 80s -upper 90s    Pulmonary/Chest: Effort normal and breath sounds normal. He has no decreased breath sounds. He has no wheezes.    Comments: Coughing     Abdominal: Soft. flat abdomen  Musculoskeletal: Normal range of motion.         General: No swelling or deformity.   Neurological: He is alert and oriented to person, place, and time. He exhibits normal muscle tone. Coordination normal.   Skin: Skin is warm, dry, intact, not diaphoretic and not pale. Psychiatric: His speech is normal and behavior is normal. Judgment and thought content normal.   Nursing note and vitals reviewed.    EKG : A. Fib w/ RVR and q waves in ant/ septal leads. > Pt w/ NO active CP. + PEREZ      Results for orders placed or performed in visit on 12/22/20   POCT COVID-19 Rapid Screening   Result Value Ref Range    POC Rapid COVID Positive (A) Negative     Acceptable Yes     Xr Chest Pa And Lateral    Result Date:  12/22/2020  EXAMINATION: XR CHEST PA AND LATERAL CLINICAL HISTORY: Cardiac arrhythmia, unspecified FINDINGS: Two views: Heart size is normal.  Lungs are clear and the bones showed DJD.     No acute process seen. Electronically signed by: Mihcele Hernández MD Date:    12/22/2020 Time:    10:15     Assessment:       1. Screening examination for unspecified infectious disease    2. Irregular heart beat    3. Atrial fibrillation with RVR        Plan:         Screening examination for unspecified infectious disease  -     POCT COVID-19 Rapid Screening    Irregular heart beat  -     XR CHEST PA AND LATERAL; Future; Expected date: 12/22/2020  -     IN OFFICE EKG 12-LEAD (to [place])  -     Refer to Emergency Dept.    Atrial fibrillation with RVR    Other orders  -     aspirin tablet 325 mg    TRANSFERRED TO [facility] ED . DISCUSSED PT W/ DR GEE WHO ACCEPTS TRANSFER

## 2020-12-22 NOTE — ED TRIAGE NOTES
Fever and cough x 5 days. +Covid and urgent care today. Tmax 102. Last took Tylenol at 1030 pm last night.

## 2020-12-23 LAB
ALBUMIN SERPL BCP-MCNC: 3.1 G/DL (ref 3.5–5.2)
ALP SERPL-CCNC: 109 U/L (ref 55–135)
ALT SERPL W/O P-5'-P-CCNC: 71 U/L (ref 10–44)
ANION GAP SERPL CALC-SCNC: 10 MMOL/L (ref 8–16)
AST SERPL-CCNC: 60 U/L (ref 10–40)
AV INDEX (PROSTH): 0.76
AV MEAN GRADIENT: 2 MMHG
AV PEAK GRADIENT: 3 MMHG
AV VALVE AREA: 2.67 CM2
AV VELOCITY RATIO: 0.8
BASOPHILS # BLD AUTO: 0.02 K/UL (ref 0–0.2)
BASOPHILS NFR BLD: 0.5 % (ref 0–1.9)
BILIRUB SERPL-MCNC: 0.3 MG/DL (ref 0.1–1)
BSA FOR ECHO PROCEDURE: 2.09 M2
BUN SERPL-MCNC: 19 MG/DL (ref 6–20)
CALCIUM SERPL-MCNC: 8.3 MG/DL (ref 8.7–10.5)
CHLORIDE SERPL-SCNC: 111 MMOL/L (ref 95–110)
CO2 SERPL-SCNC: 22 MMOL/L (ref 23–29)
CREAT SERPL-MCNC: 0.7 MG/DL (ref 0.5–1.4)
CV ECHO LV RWT: 0.42 CM
DIFFERENTIAL METHOD: NORMAL
DOP CALC AO PEAK VEL: 0.9 M/S
DOP CALC AO VTI: 19.81 CM
DOP CALC LVOT AREA: 3.5 CM2
DOP CALC LVOT DIAMETER: 2.12 CM
DOP CALC LVOT PEAK VEL: 0.72 M/S
DOP CALC LVOT STROKE VOLUME: 52.96 CM3
DOP CALCLVOT PEAK VEL VTI: 15.01 CM
E WAVE DECELERATION TIME: 212.27 MSEC
E/A RATIO: 1.36
E/E' RATIO: 10 M/S
ECHO LV POSTERIOR WALL: 0.9 CM (ref 0.6–1.1)
EOSINOPHIL # BLD AUTO: 0 K/UL (ref 0–0.5)
EOSINOPHIL NFR BLD: 0 % (ref 0–8)
ERYTHROCYTE [DISTWIDTH] IN BLOOD BY AUTOMATED COUNT: 13.4 % (ref 11.5–14.5)
EST. GFR  (AFRICAN AMERICAN): >60 ML/MIN/1.73 M^2
EST. GFR  (NON AFRICAN AMERICAN): >60 ML/MIN/1.73 M^2
FRACTIONAL SHORTENING: 40 % (ref 28–44)
GLUCOSE SERPL-MCNC: 105 MG/DL (ref 70–110)
HCT VFR BLD AUTO: 46.2 % (ref 40–54)
HGB BLD-MCNC: 15.1 G/DL (ref 14–18)
IMM GRANULOCYTES # BLD AUTO: 0.01 K/UL (ref 0–0.04)
IMM GRANULOCYTES NFR BLD AUTO: 0.3 % (ref 0–0.5)
INTERVENTRICULAR SEPTUM: 1.1 CM (ref 0.6–1.1)
LA MAJOR: 6.15 CM
LA MINOR: 5.77 CM
LA WIDTH: 3.64 CM
LEFT ATRIUM SIZE: 4.51 CM
LEFT ATRIUM VOLUME INDEX: 41.1 ML/M2
LEFT ATRIUM VOLUME: 83.08 CM3
LEFT INTERNAL DIMENSION IN SYSTOLE: 2.59 CM (ref 2.1–4)
LEFT VENTRICLE DIASTOLIC VOLUME INDEX: 41.91 ML/M2
LEFT VENTRICLE DIASTOLIC VOLUME: 84.65 ML
LEFT VENTRICLE MASS INDEX: 71 G/M2
LEFT VENTRICLE SYSTOLIC VOLUME INDEX: 12.1 ML/M2
LEFT VENTRICLE SYSTOLIC VOLUME: 24.46 ML
LEFT VENTRICULAR INTERNAL DIMENSION IN DIASTOLE: 4.33 CM (ref 3.5–6)
LEFT VENTRICULAR MASS: 144.08 G
LV LATERAL E/E' RATIO: 8.33 M/S
LV SEPTAL E/E' RATIO: 12.5 M/S
LYMPHOCYTES # BLD AUTO: 1.2 K/UL (ref 1–4.8)
LYMPHOCYTES NFR BLD: 30.3 % (ref 18–48)
MAGNESIUM SERPL-MCNC: 2 MG/DL (ref 1.6–2.6)
MCH RBC QN AUTO: 28.3 PG (ref 27–31)
MCHC RBC AUTO-ENTMCNC: 32.7 G/DL (ref 32–36)
MCV RBC AUTO: 87 FL (ref 82–98)
MONOCYTES # BLD AUTO: 0.3 K/UL (ref 0.3–1)
MONOCYTES NFR BLD: 7.6 % (ref 4–15)
MV PEAK A VEL: 0.55 M/S
MV PEAK E VEL: 0.75 M/S
NEUTROPHILS # BLD AUTO: 2.4 K/UL (ref 1.8–7.7)
NEUTROPHILS NFR BLD: 61.3 % (ref 38–73)
NRBC BLD-RTO: 0 /100 WBC
PHOSPHATE SERPL-MCNC: 1.8 MG/DL (ref 2.7–4.5)
PLATELET # BLD AUTO: 199 K/UL (ref 150–350)
PLATELET BLD QL SMEAR: NORMAL
PMV BLD AUTO: 9.6 FL (ref 9.2–12.9)
POTASSIUM SERPL-SCNC: 3.5 MMOL/L (ref 3.5–5.1)
PROT SERPL-MCNC: 6.4 G/DL (ref 6–8.4)
RA MAJOR: 5.41 CM
RA PRESSURE: 3 MMHG
RA WIDTH: 3.3 CM
RBC # BLD AUTO: 5.33 M/UL (ref 4.6–6.2)
RIGHT VENTRICULAR END-DIASTOLIC DIMENSION: 3.7 CM
SODIUM SERPL-SCNC: 143 MMOL/L (ref 136–145)
TDI LATERAL: 0.09 M/S
TDI SEPTAL: 0.06 M/S
TDI: 0.08 M/S
TRICUSPID ANNULAR PLANE SYSTOLIC EXCURSION: 2.09 CM
WBC # BLD AUTO: 3.96 K/UL (ref 3.9–12.7)

## 2020-12-23 PROCEDURE — 83735 ASSAY OF MAGNESIUM: CPT

## 2020-12-23 PROCEDURE — 27000221 HC OXYGEN, UP TO 24 HOURS

## 2020-12-23 PROCEDURE — 99225 PR SUBSEQUENT OBSERVATION CARE,LEVEL II: ICD-10-PCS | Mod: ,,, | Performed by: HOSPITALIST

## 2020-12-23 PROCEDURE — 25000003 PHARM REV CODE 250: Performed by: HOSPITALIST

## 2020-12-23 PROCEDURE — 99225 PR SUBSEQUENT OBSERVATION CARE,LEVEL II: CPT | Mod: ,,, | Performed by: HOSPITALIST

## 2020-12-23 PROCEDURE — 94761 N-INVAS EAR/PLS OXIMETRY MLT: CPT

## 2020-12-23 PROCEDURE — 93010 ELECTROCARDIOGRAM REPORT: CPT | Mod: ,,, | Performed by: INTERNAL MEDICINE

## 2020-12-23 PROCEDURE — 63600175 PHARM REV CODE 636 W HCPCS: Performed by: HOSPITALIST

## 2020-12-23 PROCEDURE — 93005 ELECTROCARDIOGRAM TRACING: CPT

## 2020-12-23 PROCEDURE — 36415 COLL VENOUS BLD VENIPUNCTURE: CPT

## 2020-12-23 PROCEDURE — 97116 GAIT TRAINING THERAPY: CPT

## 2020-12-23 PROCEDURE — 86140 C-REACTIVE PROTEIN: CPT

## 2020-12-23 PROCEDURE — 97530 THERAPEUTIC ACTIVITIES: CPT

## 2020-12-23 PROCEDURE — G0378 HOSPITAL OBSERVATION PER HR: HCPCS

## 2020-12-23 PROCEDURE — 96372 THER/PROPH/DIAG INJ SC/IM: CPT

## 2020-12-23 PROCEDURE — 96376 TX/PRO/DX INJ SAME DRUG ADON: CPT

## 2020-12-23 PROCEDURE — 97161 PT EVAL LOW COMPLEX 20 MIN: CPT

## 2020-12-23 PROCEDURE — 93010 EKG 12-LEAD: ICD-10-PCS | Mod: ,,, | Performed by: INTERNAL MEDICINE

## 2020-12-23 PROCEDURE — 84100 ASSAY OF PHOSPHORUS: CPT

## 2020-12-23 PROCEDURE — 80053 COMPREHEN METABOLIC PANEL: CPT

## 2020-12-23 PROCEDURE — 97165 OT EVAL LOW COMPLEX 30 MIN: CPT

## 2020-12-23 PROCEDURE — 85025 COMPLETE CBC W/AUTO DIFF WBC: CPT

## 2020-12-23 RX ADMIN — MELOXICAM 7.5 MG: 7.5 TABLET ORAL at 09:12

## 2020-12-23 RX ADMIN — THERA TABS 1 TABLET: TAB at 09:12

## 2020-12-23 RX ADMIN — OXYCODONE HYDROCHLORIDE AND ACETAMINOPHEN 500 MG: 500 TABLET ORAL at 09:12

## 2020-12-23 RX ADMIN — ASPIRIN 81 MG CHEWABLE TABLET 81 MG: 81 TABLET CHEWABLE at 09:12

## 2020-12-23 RX ADMIN — ENOXAPARIN SODIUM 100 MG: 100 INJECTION SUBCUTANEOUS at 02:12

## 2020-12-23 RX ADMIN — CHOLECALCIFEROL (VITAMIN D3) 25 MCG (1,000 UNIT) TABLET 1000 UNITS: TABLET at 09:12

## 2020-12-23 RX ADMIN — DEXAMETHASONE 6 MG: 4 TABLET ORAL at 09:12

## 2020-12-23 RX ADMIN — BENZONATATE 100 MG: 100 CAPSULE, LIQUID FILLED ORAL at 04:12

## 2020-12-23 RX ADMIN — LOSARTAN POTASSIUM 25 MG: 25 TABLET, FILM COATED ORAL at 09:12

## 2020-12-23 RX ADMIN — TRANDOLAPRIL 1 MG: 1 TABLET ORAL at 02:12

## 2020-12-23 RX ADMIN — BENZONATATE 100 MG: 100 CAPSULE, LIQUID FILLED ORAL at 09:12

## 2020-12-23 RX ADMIN — VERAPAMIL HYDROCHLORIDE 180 MG: 180 TABLET, FILM COATED, EXTENDED RELEASE ORAL at 09:12

## 2020-12-23 RX ADMIN — REMDESIVIR 100 MG: 100 INJECTION, POWDER, LYOPHILIZED, FOR SOLUTION INTRAVENOUS at 05:12

## 2020-12-23 NOTE — HPI
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 He lives in [place], [place].    He was seen at [facility] on 12/22/2020 for 5 days of fatigue, chills, cough, shortness of breath, fever, nausea, poor appetite, diarrhea. He was in atrial fibrillation with rapid ventricular response. His last episode of atrial fibrillation in 2019 resolved spontaneously and he takes verapamil for rate control. SARS-CoV-2 was positive. Chest X-ray showed clear lungs. He was sent to [facility] Emergency Department. Labs showed elevated inflammatory markers, normal BNP and troponin. He required nasal cannula at 1 liter/minute. He was admitted to Hospital Medicine Team [facility]O

## 2020-12-23 NOTE — PROGRESS NOTES
Hospital Medicine  Progress Note  [facility]      Patient Name: Junior Beatty  MRN:  [id number]  Hospital Medicine Team: [facility] HOS[facility] MED R Oralia Blair MD  Date of Admission:  12/22/2020     Length of Stay:  LOS: 0 days       Principal Problem:  Paroxysmal atrial fibrillation with rapid ventricular response      HPI:  59 yo male history of previous atrial fibrillation in 2019 self resolved maintaining on verapamil at home, mobility issues secondary to congenital anthrogryposis multiplex presenting with one week of fatigue and fever with atrial fibrillation with RVR and covid infection. Currenlty on 1 L NC. Notes fever precipitated atrial fibrillation    Interval History:       12/23: patient now return to sinus rhythm. On lovenox therapeutic in case recurrence but most likely can discontinue at discharge. Patient with some continued SOB. ON 2 L NC. Nausea much improved. No diarrhea    Review of Systems:  Constitutional: Positive for fever, chills, fatigue, poor appetite   HENT: Positive for sore throat, negative for trouble swallowing.    Eyes: Negative for photophobia, visual disturbance.   Respiratory: Positive for cough, shortness of breath  Cardiovascular: Negative for chest pain, palpitations, leg swelling.   Gastrointestinal: + for diarrhea improved. Negative for abdominal pain, constipation, nausea, vomiting.   Endocrine: Negative for cold intolerance, heat intolerance.   Genitourinary: Negative for dysuria, frequency.   Musculoskeletal: Negative for arthralgias, myalgias.   Skin: Negative for rash  Neurological: Negative for dizziness, syncope, light-headedness.   Psychiatric/Behavioral: Negative for confusion, hallucinations, anxiety     Inpatient Medications:    Current Facility-Administered Medications:     acetaminophen tablet 650 mg, 650 mg, Oral, Q4H PRN, Oralia Blair MD    acetaminophen tablet 650 mg, 650 mg, Oral, Q8H PRN, Oralia Blair MD    albuterol inhaler  2 puff, 2 puff, Inhalation, Q4H PRN **AND** MDI Q4H PRN, , , Q4H PRN, Oralia Blair MD    ascorbic acid (vitamin C) tablet 500 mg, 500 mg, Oral, BID, Oralia Blair MD, 500 mg at 12/23/20 0945    aspirin chewable tablet 81 mg, 81 mg, Oral, Daily, Oralia Blair MD, 81 mg at 12/23/20 0945    benzonatate capsule 100 mg, 100 mg, Oral, TID PRN, Oralia Blair MD, 100 mg at 12/23/20 0945    calcium carbonate 200 mg calcium (500 mg) chewable tablet 500 mg, 500 mg, Oral, Daily PRN, Oralia Blair MD    dexAMETHasone tablet 6 mg, 6 mg, Oral, Q12H, Oralia Blair MD, 6 mg at 12/23/20 0945    dextromethorphan-guaifenesin  mg/5 ml liquid 10 mL, 10 mL, Oral, Q4H PRN, Oralia Blair MD, 10 mL at 12/22/20 2056    dextrose 50% injection 12.5 g, 12.5 g, Intravenous, PRN, Oralia Blair MD    dextrose 50% injection 25 g, 25 g, Intravenous, PRN, Oralia Blair MD    enoxaparin injection 100 mg, 1 mg/kg, Subcutaneous, Q12H, Oralia Blair MD, 100 mg at 12/23/20 1448    glucagon (human recombinant) injection 1 mg, 1 mg, Intramuscular, PRN, Oralia Blair MD    glucose chewable tablet 16 g, 16 g, Oral, PRN, Oralia Blair MD    glucose chewable tablet 24 g, 24 g, Oral, PRN, Oralia Blair MD    loperamide capsule 2 mg, 2 mg, Oral, QID PRN, Oralia Blair MD    losartan tablet 25 mg, 25 mg, Oral, Daily, Oralia Blair MD, 25 mg at 12/23/20 0945    melatonin tablet 6 mg, 6 mg, Oral, Nightly PRN, Oralia Blair MD    meloxicam tablet 7.5 mg, 7.5 mg, Oral, Daily, Oralia Blair MD, 7.5 mg at 12/23/20 0947    multivitamin tablet, 1 tablet, Oral, Daily, Oralia Blair MD, 1 tablet at 12/23/20 0945    ondansetron disintegrating tablet 8 mg, 8 mg, Oral, Q8H PRN, Oralia Blair MD    oxyCODONE-acetaminophen 5-325 mg per tablet 1 tablet, 1 tablet, Oral, Q4H PRN, Oralia Blair MD    [COMPLETED] remdesivir 200 mg in sodium chloride 0.9% 250 mL infusion, 200 mg,  "Intravenous, Q24H, Last Rate: 250 mL/hr at 12/22/20 2056, 200 mg at 12/22/20 2056 **FOLLOWED BY** remdesivir 100 mg in sodium chloride 0.9% 250 mL infusion, 100 mg, Intravenous, Q24H, Oralia Blair MD    sodium chloride 0.9% flush 10 mL, 10 mL, Intravenous, PRN, Oralia Blair MD    trandolapriL tablet 1 mg, 1 mg, Oral, Daily, Oralia Blair MD, 1 mg at 12/23/20 1447    verapamiL CR tablet 180 mg, 180 mg, Oral, Nightly, Oralia Blair MD, 180 mg at 12/22/20 2215    vitamin D 1000 units tablet 1,000 Units, 1,000 Units, Oral, Daily, Oralia Blair MD, 1,000 Units at 12/23/20 0945      Physical Exam:      Intake/Output Summary (Last 24 hours) at 12/23/2020 1531  Last data filed at 12/23/2020 0400  Gross per 24 hour   Intake 200 ml   Output 500 ml   Net -300 ml     Wt Readings from Last 3 Encounters:   12/23/20 95.3 kg (210 lb)   12/22/20 95.3 kg (210 lb)   10/22/20 97.5 kg (215 lb)       /89   Pulse 61   Temp 97.8 °F (36.6 °C)   Resp (!) 24   Ht 5' 5" (1.651 m)   Wt 95.3 kg (210 lb)   SpO2 (!) 93%   BMI 34.95 kg/m²     GEN: NAD, conversant  Resp: coarse bilateral breath sounds, no wheezes or rales, normal work of breathing   CV: RRR, no m/r/g, no edema  GI: soft, NTND  Skin: no rash  Neuro: AAOx3, CN grossly intact, no focal neurologic deficits    Laboratory:  Lab Results   Component Value Date    [id number] Positive (A) 12/22/2020       Recent Labs   Lab 12/22/20  1218 12/23/20  0558   WBC 6.40 3.96   LYMPH 26.7  1.7 30.3  1.2   HGB 16.5 15.1   HCT 48.8 46.2    199     Recent Labs   Lab 12/22/20  1218 12/23/20  0558    143   K 3.6 3.5    111*   CO2 21* 22*   BUN 10 19   CREATININE 0.7 0.7    105   CALCIUM 8.7 8.3*   MG  --  2.0   PHOS  --  1.8*     Recent Labs   Lab 12/22/20  1218 12/23/20  0558   ALKPHOS 129 109   ALT 78* 71*   AST 83* 60*   ALBUMIN 3.7 3.1*   PROT 7.4 6.4   BILITOT 0.7 0.3   INR 0.9  --         Recent Labs     12/22/20 1218   DDIMER " 1.00*   FERRITIN 2,507*   CRP 35.6*   *   BNP 45   TROPONINI 0.023   *       All labs within the last 24 hours were reviewed.     Microbiology:  Microbiology Results (last 7 days)     ** No results found for the last 168 hours. **            Imaging  ECG Results          EKG 12-lead (Final result)  Result time 12/22/20 16:01:34    Final result by Interface, Lab In [facility] (12/22/20 16:01:34)                 Narrative:    Test Reason : U07.1,J12.89,    Vent. Rate : 129 BPM     Atrial Rate : 156 BPM     P-R Int : 000 ms          QRS Dur : 100 ms      QT Int : 310 ms       P-R-T Axes : 000 -77 041 degrees     QTc Int : 454 ms    Atrial fibrillation with rapid ventricular response  Left axis deviation  Anteroseptal infarct (cited on or before 22-DEC-2020)  Abnormal ECG  When compared with ECG of 22-DEC-2020 10:24,  Criteria for anterior infarct now present  Confirmed by Bing Vilchis MD ([id number]) on 12/22/2020 4:01:22 PM    Referred By: [email or web address]   SELF           Confirmed By:Bing Vilchis MD                              No results found for this or any previous visit.    XR CHEST PA AND LATERAL  Narrative: EXAMINATION:  XR CHEST PA AND LATERAL    CLINICAL HISTORY:  Cardiac arrhythmia, unspecified    FINDINGS:  Two views: Heart size is normal.  Lungs are clear and the bones showed DJD.  Impression: No acute process seen.    Electronically signed by: Michele Hernández MD  Date:    12/22/2020  Time:    10:15      All imaging within the last 24 hours was reviewed.     Assessment and Plan:    Active Hospital Problems    Diagnosis  POA    *Paroxysmal atrial fibrillation with rapid ventricular response [I48.0]  Yes     Chronic    Pneumonia due to COVID-19 virus [U07.1, J12.89]  Yes    Essential hypertension [I10]  Yes     Chronic    Hyperlipidemia [E78.5]  Yes     Chronic    Osteoarthritis [M19.90]  Yes     Chronic    Arthrogryposis multiplex congenita [Q74.3]  Not Applicable     Chronic     orthoglyconeogenesis  of hips, knees and elbows      Presence of right artificial hip joint [Z96.641]  Not Applicable     Chronic      Resolved Hospital Problems   No resolved problems to display.       COVID-19 Virus Infection  Viral Pneumonia due to COVID-19  - COVID-19 testing: Collection Date: 10/20/2020 Collection Time:   7:37 AM   - Isolation: Airborne/Droplet. Surgical mask on patient. Notify Infection Control  - Diagnostics: Trend Q48hrs if stable, more frequently if patient decompensating   - Management: Per [facility] COVID Treatment Protocol    - Telemetry & Continuous Pulse Oximetry    - Nutrition:        - Multivitamin PO daily       - Boost supplement       - Vitamin D 1000IU daily if deficient       - Ascorbic acid 500mg PO bid    - Supportive Care:       - acetaminophen 650mg PO Q6hr PRN fever/headache       - loperamide PRN viral diarrhea       - IVF if indicated, restrictive strategy preferred, no maintenance IV if able       - VTE PPx: enoxaparin or heparin SQ unless contraindicated    - Antibiotics: no currenlty indicated     - Remdesivir 12/22     - Dexamethasone 12/22     - Convalescent Plasma not indicated     Acute Hypoxemic Respiratory Failure    - Order RT consult via Respiratory Communication for COVID Protocols    - Incentive Spirometer Q4h, Flutter Valve Q4h    - Continuous Pulse Oximetry, goal SpO2 92-96%    - Supplemental O2 via LFNC, VentiMask, or HFNC (see Respiratory Support Oxygen Therapies)    - If wheezing, albuterol INH Q6h scheduled & PRN    - Proning Protocol if patient is a candidate (see  Proning Protocol)              - GCS >13, able to self-prone    - If deterioration, may warrant trial of NIPPV in neg pressure room or immediate ICU consult        Atrial fibrillation with RVR  - in setting of acute infection/fever  - received verapamil IV in ED and currently HR 88 -100  - otherwise hemodynamically stable  - discussed options for treatment with patient - agree for rate control overnight with  "verapamil and lovenox, echo when available. If no interval improvement will discuss cardioversion options with cardiology.   - Chadsvasc 1 not indicative of need for long term anticoagulation.          HTN  - patient on ace and arb - long term medication at home continued.      Advance Care Planning  Goals of care, counseling/discussion full  If patient transitions to Comfort-Focused Care, please place "Nurse Communication: End of Life Care, family members allowed to visit, including spouse/partner and adult children [please list names]. Please ask family to visit as a group and leave as a group.      VTE High Risk Prophylaxis:   VTE Risk Mitigation (From admission, onward)         Ordered     enoxaparin injection 100 mg  Every 12 hours (non-standard times)      12/22/20 1344     IP VTE HIGH RISK PATIENT  Once      12/22/20 1343     Place sequential compression device  Until discontinued      12/22/20 1343                  Subsequent Inpatient Hospital Care  Level 2 [id number] Total visit time was 25 minutes or greater with greater than 50% of time spent in counseling and coordination of care.     High Risk Conditions:  Patient has an abrupt change in neurologic status: Weakness        Oralia Blair       "

## 2020-12-23 NOTE — PLAN OF CARE
Problem: Physical Therapy Goal  Goal: Physical Therapy Goal  Description: PT evaluation completed   12/23/2020 1459 by Marion Ortiz, PT  Outcome: Met    PT evaluation complete. No goals established secondary to patient functional at their baseline with mobility and has no acute PT needs at this time. D/C from PT services.  Marion Ortiz, PT, DPT  12/23/2020

## 2020-12-23 NOTE — PT/OT/SLP EVAL
Physical Therapy Evaluation and Discharge Note  Co-Eval with OT    Patient Name:  Junior Beatty   MRN:  [id number]    Co-treatment performed due to patient's multiple deficits requiring two skilled therapists to appropriately and safely assess patient's strength and endurance while facilitating functional tasks in addition to accommodating for patient's activity tolerance. Co-tx completed with OT due to patients decreased activity tolerance in the presence of COVID-19.   Recommendations:     Discharge Recommendations:  home   Discharge Equipment Recommendations: none   Barriers to discharge: None    Assessment:     Junior Beatty is a 60 y.o. male admitted with a medical diagnosis of Paroxysmal atrial fibrillation with rapid ventricular response. .  At this time, patient is functioning at their prior level of function and does not require further acute PT services.     Patient on 1LO2 upon entry O2 96%. With mobility patient on RA - O2 as low as 93%. No reports of SOB.     Recent Surgery: * No surgery found *      Plan:     During this hospitalization, patient does not require further acute PT services.  Please re-consult if situation changes.      Subjective     Chief Complaint: none   Patient/Family Comments/goals: to get to the chair   Pain/Comfort:  · Pain Rating 1: 0/10  · Pain Rating Post-Intervention 1: 0/10    Patients cultural, spiritual, Baptism conflicts given the current situation: no    Living Environment:  Patient lives alone in [facility] and 8 [address] (handrails). He was (I) for ADLs with adaptive equipment as needed and (I) for mobility with occ use of walking stick. He is an active . Has a tub/shower for bathing. Will have (A) upon d/c.  Equipment used at home: walker, rolling, rollator, cane, straight, shower chair(walking stick, adaptive equipment for self-care).  DME owned (not currently used): none.  Upon discharge, patient will have assistance from friends and family.    Objective:      Communicated with RN prior to session.  Patient found HOB elevated with telemetry, peripheral IV, pulse ox (continuous), oxygen upon PT entry to room.    General Precautions: Standard, fall, airborne, droplet, contact   Orthopedic Precautions:N/A   Braces: N/A     Exams:  · Cognitive Exam:  Patient is oriented to Person, Place, Time and Situation  · Fine Motor Coordination:    · -       Intact  Rapid alternating ankle DF/PF  · Postural Exam:  Patient presented with the following abnormalities:    · -       Rounded shoulders  · -       Forward head  · Sensation:    · -       Intact  light/touch BLE  · RLE ROM: WFL  · RLE Strength: WFL  · LLE ROM: WFL except limited DF/PF due to ankle fusion   · LLE Strength: WFL    Functional Mobility:  · Bed Mobility:     · Rolling Right: contact guard assistance  · Scooting: contact guard assistance  · Supine to Sit: contact guard assistance  · Transfers:     · Sit to Stand:  stand by assistance with no AD  · Gait: 60ft with Sup and no AD  ·  no unsteadiness  or loss of balance, lateral postural sway    · Balance: sitting EOB - (I); static standing - Sup; dynamic standing - Sup     AM-PAC 6 CLICK MOBILITY  Total Score:24       Therapeutic Activities and Exercises:   Patient educated on role and goal of PT services within acute care setting   Patient - Sup for mobility (encourage mobility and ambulation)  Questions and concerns answered within PT scope     AM-PAC 6 CLICK MOBILITY  Total Score:24     Patient left up in chair with all lines intact, call button in reach and RN notified.    GOALS:   Multidisciplinary Problems     Physical Therapy Goals     Not on file          Multidisciplinary Problems (Resolved)        Problem: Physical Therapy Goal    Goal Priority Disciplines Outcome Goal Variances Interventions   Physical Therapy Goal   (Resolved)     PT, PT/OT Met     Description: PT evaluation completed                    History:     Past Medical History:   Diagnosis Date     Arthritis     Arthrogryposis     Hyperlipidemia     Hypertension        Past Surgical History:   Procedure Laterality Date    ANKLE SURGERY Left 1964    Klever procedure    APPLICATION OF SPICA CAST  1962-63    COLONOSCOPY  2014    ELBOW SURGERY Bilateral 1965    FRACTURE SURGERY Left 1966    HEMORRHOID SURGERY  2009    HIP ARTHROPLASTY Right 5/27/2019    Procedure: ARTHROPLASTY, HIP;  Surgeon: Willian Covarrubias MD;  Location: [facility];  Service: Orthopedics;  Laterality: Right;  OFIRMEV    JOINT REPLACEMENT Left 1993    w/reconstruction 2002 bilateral hips    KNEE ARTHROSCOPY Right 1986    TONSILLECTOMY      ULNAR NERVE TRANSPOSITION Left 10/23/2020    Procedure: TRANSPOSITION, NERVE, ULNAR;  Surgeon: Claude S. Williams IV, MD;  Location: [facility];  Service: Orthopedics;  Laterality: Left;    wrist release Bilateral 1965       Time Tracking:     PT Received On: 12/23/20  PT Start Time: 1421     PT Stop Time: 1442  PT Total Time (min): 21 min     Billable Minutes: Evaluation 10 and Gait Training 11      Marion Ortiz, PT  12/23/2020

## 2020-12-23 NOTE — PLAN OF CARE
12/22/2020 11:22 AM   Atrial fibrillation with RVR [I48.91]  Pneumonia due to COVID-19 virus [U07.1, J12.89]     Due to Covid precautions, CM contacted patient via his cell [phone] to complete discharge assessment.     Pt lives alone in a single story home with 8 steps.  Handrails.  Equipment Used at Home: straight cane, Rolling Walker, rollator, shower chair.    He requires AD to perfoms his ADL's, still driving. He reports he has a physical handicap with the use of his arms.    Patient reports he has resources available to meet his needs with shopping, errands. MD appts.    Not a dialysis patient and is not on COUMADIN.  Contact phone numbers provided.  All questions answered.    Will need 6 minute walk test at discharge to determine home oxygen needs.    When medically stable, anticipate discharge plan  - home, no needs.  PT/OT Recs:  Home no needs.    CM to follow for discharge planning needs.  KURTIS Siddiqui RN  Case Management  [phone]       Primary Doctor No DR. SARAHI GUZMAN [phone] - patient states he has an appointment scheduled between [place] and [facility].    Payor: [employer] / Plan: [employer] / Product Type: Commercial /        [id number] [place]  [address]  [address] [facility]  [place] [address]  Phone: [phone] Fax: [phone]       Extended Emergency Contact Information  Primary Emergency Contact: Anahi Corrales  Address: [address] [facility]  Home Phone: [phone]  Mobile Phone: [phone]  Relation: Mother  Secondary Emergency Contact: Sincere Beatty  Address: [address] [place] [address] [place]  Mobile Phone: [phone]  Relation: Brother        12/23/20 1737   Discharge Assessment   Assessment Type Discharge Planning Assessment   Confirmed/corrected address and phone number on facesheet? Yes   Assessment information obtained  from? Patient   Expected Length of Stay (days) 2   Communicated expected length of stay with patient/caregiver yes   Prior to hospitilization cognitive status: Alert/Oriented   Prior to hospitalization functional status: Independent;Assistive Equipment   Current cognitive status: Alert/Oriented   Current Functional Status: Independent;Assistive Equipment   Lives With alone   Able to Return to Prior Arrangements yes   Is patient able to care for self after discharge? Yes   Who are your caregiver(s) and their phone number(s)? Mother Anahi Corrales [phone]   Patient's perception of discharge disposition home or selfcare   Readmission Within the Last 30 Days no previous admission in last 30 days   Patient currently being followed by outpatient case management? No   Patient currently receives any other outside agency services? No   Equipment Currently Used at Home walker, rolling;rollator;cane, straight;shower chair   Do you have any problems affording any of your prescribed medications? TBD   Is the patient taking medications as prescribed? yes   Does the patient have transportation home? Yes   Transportation Anticipated family or friend will provide   Dialysis Name and Scheduled days NA   Does the patient receive services at the [facility]? No   Discharge Plan A Home with family;Home   Discharge Plan B Home;Home with family   DME Needed Upon Discharge  other (see comments)  (TBD)   Patient/Family in Agreement with Plan yes

## 2020-12-23 NOTE — PLAN OF CARE
Assistance need with urinal and other items occ. Due to history. Able to truns self from side-to-side.Able to use calll light approp.

## 2020-12-23 NOTE — PT/OT/SLP EVAL
Occupational Therapy   Evaluation and Discharge Note    Name: Junior Beatty  MRN: [id number]  Admitting Diagnosis:  Paroxysmal atrial fibrillation with rapid ventricular response      Recommendations:     Discharge Recommendations: home  Discharge Equipment Recommendations:  none  Barriers to discharge:  None    Assessment:     Junior Beatty is a 60 y.o. male with a medical diagnosis of Paroxysmal atrial fibrillation with rapid ventricular response. At this time, patient is functioning at their prior level of function and does not require further acute OT services.     Plan:     During this hospitalization, patient does not require further acute OT services.  Please re-consult if situation changes.    · Plan of Care Reviewed with: patient    Subjective     Chief Complaint: generalized fatigue   Patient/Family Comments/goals: to return home     Occupational Profile:  Living Environment: Pt lives alone in [facility] with 8 VALENCIA and B HR present. Pt has a walk-in shower with shower chair present. Pt reports no recent falls. Pt was driving PTA.   Previous level of function: PTA, pt is mod (I) for ADLs using AD for bathing, toileting nad LB dressing. PTA, pt is (I) <>mod (I) for functional mobility using walking stick as needed.   Roles and Routines: caretaker to self   Equipment Used at home:  walker, rolling, cane, straight, rollator, shower chair(adaptive DME for self-care task)  Assistance upon Discharge: pt reports he has family that will be able to assist pt if needed upon d/c.     Pain/Comfort:  · Pain Rating 1: 0/10  · Pain Rating Post-Intervention 1: 0/10    Patients cultural, spiritual, Mandaen conflicts given the current situation: no    Objective:     Communicated with: RN prior to session.  Patient found HOB elevated with telemetry, pulse ox (continuous), oxygen, peripheral IV upon OT entry to room. Pt agreeable to therapy session.     General Precautions: Standard, airborne, contact, droplet, fall    Orthopedic Precautions:N/A   Braces: N/A     Occupational Performance:    Bed Mobility:    · Patient completed Rolling/Turning to Right with modified independence  · Patient completed Scooting/Bridging with modified independence  · Patient completed Supine to Sit with supervision, with side rail and HOB elevated     Functional Mobility/Transfers:  · Patient completed Sit <> Stand Transfer with modified independence  with  no assistive device   · Patient completed Bed <> Chair Transfer using Step Transfer technique with modified independence with no assistive device  · Functional Mobility: Pt engaging in functional mobility to simulate household/community distances approx 60ft  with supervision and utilizing no AD in order to maximize functional activity tolerance and standing balance required for engagement in occupations of choice.   · Pt on room air  · Oxygen sats >90%   · No LOB     Activities of Daily Living:  · Not ADLs performed this date. Pt reports no concerns with ADLs.     Cognitive/Visual Perceptual:  Cognitive/Psychosocial Skills:     -       Oriented to: Person, Place, Time and Situation   -       Follows Commands/attention:Follows multistep  commands  -       Communication: clear/fluent  -       Memory: No Deficits noted  -       Safety awareness/insight to disability: intact   -       Mood/Affect/Coping skills/emotional control: Appropriate to situation  Visual/Perceptual:      -Intact      Physical Exam:  Balance:     Static sit: Supervision    Dynamic sit: supervision    Static standing: CGA <>SBA   Dynamic Standing: CGA    Postural examination/scapula alignment:    -       Rounded shoulders  -       Forward head  Skin integrity: Visible skin intact  Edema:  None noted  Sensation:    -       Intact  Dominant hand:    -       right   Upper Extremity Range of Motion:     -       Right Upper Extremity: WFL except limited elbow flexion and shoulder flexion   -       Left Upper Extremity:  WFL  except limited elbow flexion and shoulder flexion   Upper Extremity Strength:    -       Right Upper Extremity: WFL  -       Left Upper Extremity: WFL   Strength:    -       Right Upper Extremity: 3+/5  -       Left Upper Extremity: 3+/5     AMPAC 6 Click ADL:  AMPAC Total Score: 24    Treatment & Education:   Pt educated on role of OT, POC, and d/c recs    POC was dicussed with patient/caregiver, who was included in its development and is in agreement with the identified goals and treatment plan.    Time provided for therapeutic counseling and discussion of health disposition.    Educated on importance of EOB/OOB mobility, maintaining routine, sitting up in chair, and maximizing independence with ADLs during admission    Pt completed ADLs and functional mobility for treatment session as noted above    Pt/caregiver verbalized understanding and expressed no further concerns/questions.   Updated communication board with level of assist required (supervision  x 1 person assistance ) & educated RN/patient that pt is appropriate for transfers and mobility  with RN/PCT.     Co-evaluation/treatment performed due to patient's multiple deficits requiring two skilled therapists to appropriately and safely assess patient's strength and endurance while facilitating functional tasks in addition to accommodating for patient's activity tolerance.     Education:    Patient left up in chair with all lines intact, call button in reach and RN  notified    GOALS:   Multidisciplinary Problems     Occupational Therapy Goals     Not on file          Multidisciplinary Problems (Resolved)        Problem: Occupational Therapy Goal    Goal Priority Disciplines Outcome Interventions   Occupational Therapy Goal   (Resolved)     OT, PT/OT Met                    History:     Past Medical History:   Diagnosis Date    Arthritis     Arthrogryposis     Hyperlipidemia     Hypertension        Past Surgical History:   Procedure Laterality  Date    ANKLE SURGERY Left 1964    Klever procedure    APPLICATION OF SPICA CAST  1962-63    COLONOSCOPY  2014    ELBOW SURGERY Bilateral 1965    FRACTURE SURGERY Left 1966    HEMORRHOID SURGERY  2009    HIP ARTHROPLASTY Right 5/27/2019    Procedure: ARTHROPLASTY, HIP;  Surgeon: Willian Covarrubias MD;  Location: [facility];  Service: Orthopedics;  Laterality: Right;  OFIRMEV    JOINT REPLACEMENT Left 1993    w/reconstruction 2002 bilateral hips    KNEE ARTHROSCOPY Right 1986    TONSILLECTOMY      ULNAR NERVE TRANSPOSITION Left 10/23/2020    Procedure: TRANSPOSITION, NERVE, ULNAR;  Surgeon: Claude S. Williams IV, MD;  Location: [facility];  Service: Orthopedics;  Laterality: Left;    wrist release Bilateral 1965       Time Tracking:     OT Date of Treatment: 12/23/20  OT Start Time: 1421  OT Stop Time: 1439  OT Total Time (min): 18 min    Billable Minutes:Evaluation 10  Therapeutic Activity 8    Pati Kramer, OT  12/23/2020

## 2020-12-23 NOTE — PLAN OF CARE
Pt remained free from falls. Pt ambulated in room w/ assistance x1. Pt AAOx4.   Problem: Fall Injury Risk  Goal: Absence of Fall and Fall-Related Injury  Outcome: Ongoing, Progressing     Problem: Fall Injury Risk  Goal: Absence of Fall and Fall-Related Injury  Outcome: Ongoing, Progressing     Problem: Adult Inpatient Plan of Care  Goal: Plan of Care Review  Outcome: Ongoing, Progressing  Goal: Patient-Specific Goal (Individualization)  Outcome: Ongoing, Progressing  Goal: Absence of Hospital-Acquired Illness or Injury  Outcome: Ongoing, Progressing  Goal: Optimal Comfort and Wellbeing  Outcome: Ongoing, Progressing  Goal: Readiness for Transition of Care  Outcome: Ongoing, Progressing  Goal: Rounds/Family Conference  Outcome: Ongoing, Progressing     Problem: Skin Injury Risk Increased  Goal: Skin Health and Integrity  Outcome: Ongoing, Progressing

## 2020-12-23 NOTE — PLAN OF CARE
Problem: Occupational Therapy Goal  Goal: Occupational Therapy Goal  Outcome: Met     Intial eval completed   No goals established; pt at baseline with mobility and ADLs.   Pt d/c from acute OT 12/23/2020    Pati Kramer OTR/L  Pager: [phone]  12/23/2020

## 2020-12-23 NOTE — PLAN OF CARE
12/23/20 1737   Post-Acute Status   Post-Acute Authorization Other   Other Status No Post-Acute Service Needs   Discharge Delays None known at this time   Discharge Plan   Discharge Plan A Home with family;Home   Discharge Plan B Home;Home with family     CM to follow for discharge planning needs.  VEL SiddiquiN RN  Case Management  [phone]

## 2020-12-23 NOTE — HOSPITAL COURSE
He was put on ascorbic acid, vitamin D, dexamethasone, remdesivir. Physical and Occupational Therapy noted no functional limitations. Heart rate was controlled. He was prescribed 3 more days of dexamethasone, albuterol inhaler, and 10 tablets of benzonatate.

## 2020-12-24 VITALS
HEART RATE: 72 BPM | WEIGHT: 209.44 LBS | HEIGHT: 65 IN | SYSTOLIC BLOOD PRESSURE: 164 MMHG | RESPIRATION RATE: 20 BRPM | BODY MASS INDEX: 34.89 KG/M2 | DIASTOLIC BLOOD PRESSURE: 93 MMHG | TEMPERATURE: 99 F | OXYGEN SATURATION: 96 %

## 2020-12-24 PROBLEM — J06.9 UPPER RESPIRATORY TRACT INFECTION DUE TO COVID-19 VIRUS: Status: ACTIVE | Noted: 2020-12-22

## 2020-12-24 LAB
ALBUMIN SERPL BCP-MCNC: 3.1 G/DL (ref 3.5–5.2)
ALP SERPL-CCNC: 100 U/L (ref 55–135)
ALT SERPL W/O P-5'-P-CCNC: 79 U/L (ref 10–44)
ANION GAP SERPL CALC-SCNC: 10 MMOL/L (ref 8–16)
AST SERPL-CCNC: 57 U/L (ref 10–40)
BASOPHILS # BLD AUTO: 0.01 K/UL (ref 0–0.2)
BASOPHILS NFR BLD: 0.2 % (ref 0–1.9)
BILIRUB SERPL-MCNC: 0.2 MG/DL (ref 0.1–1)
BUN SERPL-MCNC: 18 MG/DL (ref 6–20)
CALCIUM SERPL-MCNC: 8.5 MG/DL (ref 8.7–10.5)
CHLORIDE SERPL-SCNC: 111 MMOL/L (ref 95–110)
CO2 SERPL-SCNC: 22 MMOL/L (ref 23–29)
CREAT SERPL-MCNC: 0.6 MG/DL (ref 0.5–1.4)
CRP SERPL-MCNC: 12.5 MG/L (ref 0–8.2)
D DIMER PPP IA.FEU-MCNC: <0.19 MG/L FEU
DIFFERENTIAL METHOD: ABNORMAL
EOSINOPHIL # BLD AUTO: 0 K/UL (ref 0–0.5)
EOSINOPHIL NFR BLD: 0 % (ref 0–8)
ERYTHROCYTE [DISTWIDTH] IN BLOOD BY AUTOMATED COUNT: 13.5 % (ref 11.5–14.5)
EST. GFR  (AFRICAN AMERICAN): >60 ML/MIN/1.73 M^2
EST. GFR  (NON AFRICAN AMERICAN): >60 ML/MIN/1.73 M^2
GLUCOSE SERPL-MCNC: 126 MG/DL (ref 70–110)
HCT VFR BLD AUTO: 45.1 % (ref 40–54)
HGB BLD-MCNC: 14.5 G/DL (ref 14–18)
IMM GRANULOCYTES # BLD AUTO: 0.04 K/UL (ref 0–0.04)
IMM GRANULOCYTES NFR BLD AUTO: 0.7 % (ref 0–0.5)
LYMPHOCYTES # BLD AUTO: 1 K/UL (ref 1–4.8)
LYMPHOCYTES NFR BLD: 16.3 % (ref 18–48)
MAGNESIUM SERPL-MCNC: 1.9 MG/DL (ref 1.6–2.6)
MCH RBC QN AUTO: 28.4 PG (ref 27–31)
MCHC RBC AUTO-ENTMCNC: 32.2 G/DL (ref 32–36)
MCV RBC AUTO: 88 FL (ref 82–98)
MONOCYTES # BLD AUTO: 0.2 K/UL (ref 0.3–1)
MONOCYTES NFR BLD: 2.5 % (ref 4–15)
NEUTROPHILS # BLD AUTO: 4.8 K/UL (ref 1.8–7.7)
NEUTROPHILS NFR BLD: 80.3 % (ref 38–73)
NRBC BLD-RTO: 0 /100 WBC
PHOSPHATE SERPL-MCNC: 2.3 MG/DL (ref 2.7–4.5)
PLATELET # BLD AUTO: 203 K/UL (ref 150–350)
PMV BLD AUTO: 9.3 FL (ref 9.2–12.9)
POTASSIUM SERPL-SCNC: 3.5 MMOL/L (ref 3.5–5.1)
PROT SERPL-MCNC: 6.5 G/DL (ref 6–8.4)
RBC # BLD AUTO: 5.1 M/UL (ref 4.6–6.2)
SODIUM SERPL-SCNC: 143 MMOL/L (ref 136–145)
WBC # BLD AUTO: 5.96 K/UL (ref 3.9–12.7)

## 2020-12-24 PROCEDURE — 36415 COLL VENOUS BLD VENIPUNCTURE: CPT

## 2020-12-24 PROCEDURE — 63600175 PHARM REV CODE 636 W HCPCS: Performed by: HOSPITALIST

## 2020-12-24 PROCEDURE — 25000003 PHARM REV CODE 250: Performed by: HOSPITALIST

## 2020-12-24 PROCEDURE — 85379 FIBRIN DEGRADATION QUANT: CPT

## 2020-12-24 PROCEDURE — 85025 COMPLETE CBC W/AUTO DIFF WBC: CPT

## 2020-12-24 PROCEDURE — 84100 ASSAY OF PHOSPHORUS: CPT

## 2020-12-24 PROCEDURE — 83735 ASSAY OF MAGNESIUM: CPT

## 2020-12-24 PROCEDURE — 99217 PR OBSERVATION CARE DISCHARGE: CPT | Mod: ,,, | Performed by: HOSPITALIST

## 2020-12-24 PROCEDURE — 99217 PR OBSERVATION CARE DISCHARGE: ICD-10-PCS | Mod: ,,, | Performed by: HOSPITALIST

## 2020-12-24 PROCEDURE — 80053 COMPREHEN METABOLIC PANEL: CPT

## 2020-12-24 PROCEDURE — G0378 HOSPITAL OBSERVATION PER HR: HCPCS

## 2020-12-24 RX ORDER — BENZONATATE 100 MG/1
100 CAPSULE ORAL 3 TIMES DAILY PRN
Qty: 10 CAPSULE | Refills: 0 | Status: SHIPPED | OUTPATIENT
Start: 2020-12-24 | End: 2020-12-29

## 2020-12-24 RX ORDER — ALBUTEROL SULFATE 90 UG/1
2 AEROSOL, METERED RESPIRATORY (INHALATION) EVERY 4 HOURS PRN
Qty: 18 G | Refills: 0 | Status: SHIPPED | OUTPATIENT
Start: 2020-12-24 | End: 2021-01-14

## 2020-12-24 RX ORDER — DEXAMETHASONE 6 MG/1
6 TABLET ORAL DAILY
Qty: 3 TABLET | Refills: 0 | Status: SHIPPED | OUTPATIENT
Start: 2020-12-24 | End: 2020-12-27

## 2020-12-24 RX ADMIN — MELOXICAM 7.5 MG: 7.5 TABLET ORAL at 08:12

## 2020-12-24 RX ADMIN — LOSARTAN POTASSIUM 25 MG: 25 TABLET, FILM COATED ORAL at 08:12

## 2020-12-24 RX ADMIN — CHOLECALCIFEROL (VITAMIN D3) 25 MCG (1,000 UNIT) TABLET 1000 UNITS: TABLET at 08:12

## 2020-12-24 RX ADMIN — ASPIRIN 81 MG CHEWABLE TABLET 81 MG: 81 TABLET CHEWABLE at 08:12

## 2020-12-24 RX ADMIN — THERA TABS 1 TABLET: TAB at 08:12

## 2020-12-24 RX ADMIN — DEXAMETHASONE 6 MG: 4 TABLET ORAL at 09:12

## 2020-12-24 RX ADMIN — TRANDOLAPRIL 1 MG: 1 TABLET ORAL at 09:12

## 2020-12-24 RX ADMIN — OXYCODONE HYDROCHLORIDE AND ACETAMINOPHEN 500 MG: 500 TABLET ORAL at 08:12

## 2020-12-24 NOTE — PLAN OF CARE
Patient discharged home - no needs.  6 minute walk test completed - does not require home oxygen.  CM scheduled post hospital follow up appointment.  ROBERT set up transportation.    CM to follow for discharge planning needs.  KURTIS Siddiqui RN  Case Management  [phone]        12/24/20 1213   Final Note   Assessment Type Final Discharge Note   Anticipated Discharge Disposition Home   Hospital Follow Up  Appt(s) scheduled? Yes   Discharge plans and expectations educations in teach back method with documentation complete? Yes  (provided by bedside nurse)   Post-Acute Status   Post-Acute Authorization Other   Other Status No Post-Acute Service Needs   Discharge Delays None known at this time

## 2020-12-24 NOTE — PLAN OF CARE
Problem: Adult Inpatient Plan of Care  Goal: Plan of Care Review  Outcome: Met     Problem: Fall Injury Risk  Goal: Absence of Fall and Fall-Related Injury  Outcome: Met     Problem: Adult Inpatient Plan of Care  Goal: Optimal Comfort and Wellbeing  Outcome: Met     Problem: Adult Inpatient Plan of Care  Goal: Readiness for Transition of Care  Outcome: Met

## 2020-12-24 NOTE — DISCHARGE SUMMARY
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 He lives in [place], [place].    He was seen at [facility] on 12/22/2020 for 5 days of fatigue, chills, cough, shortness of breath, fever, nausea, poor appetite, diarrhea. He was in atrial fibrillation with rapid ventricular response. His last episode of atrial fibrillation in 2019 resolved spontaneously and he takes verapamil for rate control. SARS-CoV-2 was positive. Chest X-ray showed clear lungs. He was sent to [facility]. Labs showed elevated inflammatory markers, normal BNP and troponin. He required nasal cannula at 1 liter/minute. He was admitted to Hospital Medicine Team R.       Hospital Course:   He was put on ascorbic acid, vitamin D, dexamethasone, remdesivir. Physical and Occupational Therapy noted no functional limitations. Heart rate was controlled. He was prescribed 3 more days of dexamethasone, albuterol inhaler, and 10 tablets of benzonatate.      Consults:   Consults (From admission, onward)        Status Ordering Provider     Inpatient consult to Social Work/Case Management  Once     Provider:  (Not yet assigned)    Acknowledged KHADAR LINO        Final Active Diagnoses:    Diagnosis Date Noted POA     PRINCIPAL PROBLEM:  Paroxysmal atrial fibrillation with rapid ventricular response [I48.0] 06/07/2019 Yes     Chronic    Upper respiratory tract infection due to COVID-19 virus [U07.1, J06.9] 12/22/2020 Yes    Essential hypertension [I10] 06/27/2019 Yes     Chronic    Hyperlipidemia [E78.5] 06/27/2019 Yes     Chronic    Osteoarthritis [M19.90] 06/27/2019 Yes     Chronic    Arthrogryposis multiplex congenita [Q74.3] 06/27/2019 Not Applicable     Chronic    Presence of right artificial hip joint [Z96.641] 05/29/2019 Not Applicable     Chronic      Problems Resolved During this Admission:       Discharged Condition: good    Disposition: Home or Self Care    Follow Up:  Follow-up Information     Tarik Flowers MD On 12/29/2020.    Specialty: General Practice  Why: Hospital Follow Up - patient has an exisitng apoointment Tuesday Dec 29 at 9am  Contact information:  [address] [facility]  [address]  [facility] [id number] [phone]                 Patient Instructions:      Diet Adult Regular     COVID-19 Surveillance Program     Order Specific Question Answer Comments   Does patient have a smartphone? Yes    Does patient have the [facility] on their smartphone? No    While in surveillance program, will patient be using home oxygen? No      Notify your health care provider if you experience any of the following:  difficulty breathing or increased cough     Activity as tolerated       Significant Diagnostic Studies:   Transthoracic echo (TTE) complete 12/23/20:  ·  Techncially challenging study. limited view of the valves.  · The left ventricle is normal in size with normal systolic function. The estimated ejection fraction is 65%  · Indeterminate left ventricular diastolic function.  · Normal right ventricular size with normal right ventricular systolic function.  · Mild left atrial enlargement.  Normal central venous pressure (3 mmHg).    Medications:  Reconciled Home Medications:      Medication List      START taking  these medications    benzonatate 100 MG capsule  Commonly known as: TESSALON  Take 1 capsule (100 mg total) by mouth 3 (three) times daily as needed for Cough.     dexAMETHasone 6 MG tablet  Commonly known as: DECADRON  Take 1 tablet (6 mg total) by mouth once daily. for 3 days     pulse oximeter device  Commonly known as: pulse oximeter  by Apply Externally route 2 (two) times a day. Use twice daily at 8 AM and 3 PM and [facility] the value in [facility] as directed.     VENTOLIN HFA 90 mcg/actuation inhaler  Generic drug: albuterol  Inhale 2 puffs into the lungs every 4 (four) hours as needed for Wheezing. Rescue        CONTINUE taking these medications    acetaminophen 325 MG tablet  Commonly known as: TYLENOL  Take 2 tablets (650 mg total) by mouth every 6 (six) hours as needed.     aspirin 81 MG Chew  Take 81 mg by mouth once daily.     atorvastatin 20 MG tablet  Commonly known as: LIPITOR  Take 1 tablet (20 mg total) by mouth once daily.     b complex vitamins capsule  Take 1 capsule by mouth once daily.     CALCIUM 600 + D(3) 600 mg(1,500mg) -200 unit Tab  Generic drug: calcium-vitamin D3  Take 1 tablet by mouth once.     FLAXSEED ORAL  Take by mouth.     garlic 1,000 mg Cap  Take by mouth.     HYDROcodone-acetaminophen 5-325 mg per tablet  Commonly known as: NORCO  Take 1 tablet by mouth every 4 (four) hours as needed for Pain.     losartan 100 MG tablet  Commonly known as: COZAAR  Take 1 tablet (100 mg total) by mouth once daily.     meloxicam 7.5 MG tablet  Commonly known as: MOBIC  Take 7.5 mg by mouth once daily.     OMEGA 3-6-9 ORAL  Take 1,000 Units by mouth.     potassium 99 mg Tab  Take by mouth once.     trandolapriL 2 MG Tab  Commonly known as: MAVIK  Take 1 mg by mouth once daily.     verapamiL 180 MG CR tablet  Commonly known as: CALAN-SR  Take 1 tablet (180 mg total) by mouth once daily.     VITAMIN A ORAL  Take by mouth.     VITAMIN C 1000 MG tablet  Generic drug: ascorbic acid (vitamin C)  Take 1,000  mg by mouth once daily.     VITAMIN D3 1000 units Tab  Generic drug: vitamin D  Take 1,000 Units by mouth once daily. Takes 125 mcg     VITAMIN E ACETATE ORAL  Take by mouth. Takes 180mg            Indwelling Lines/Drains at time of discharge: None    Time spent on the discharge of patient: 35 minutes  Patient was seen and examined on the date of discharge and determined to be suitable for discharge.         Soto Motta MD  Department of Hospital Medicine  [facility]

## 2020-12-24 NOTE — PLAN OF CARE
No SW needs identified at this time.     SW arranged stretcher transport via [facility]. Requested  time is 11:30.  Requested  time does not guarantee arrival time.  If transport does not arrive by 12:30 please contact assigned SW or on-call for assistance.       12/24/20 1041   Post-Acute Status   Post-Acute Authorization Other   Other Status No Post-Acute Service Needs   Discharge Delays None known at this time   Discharge Plan   Discharge Plan A Home with family     Magalys Kelly LMSW   - Case Management

## 2020-12-24 NOTE — NURSING
Home Oxygen Evaluation    Date Performed: 2020    1) Patient's Home O2 Sat on room air, while at rest: 96        If O2 sats on room air at rest are 88% or below, patient qualifies. No additional testing needed. Document N/A in steps 2 and 3. If 89% or above, complete steps 2.      2) Patient's O2 Sat on room air while exercisin        If O2 sats on room air while exercising remain 89% or above patient does not qualify, no further testing needed Document N/A in step 3. If O2 sats on room air while exercising are 88% or below, continue to step 3.      3) Patient's O2 Sat while exercising on O2: N/A         (Must show improvement from #2 for patients to qualify)    If O2 sats improve on oxygen, patient qualifies for portable oxygen. If not, the patient does not qualify.

## 2020-12-24 NOTE — NURSING
Pt left the floor via [facility] at this time. No distress noted. Belongings and discharge instructions in hand.

## 2020-12-25 ENCOUNTER — PATIENT MESSAGE (OUTPATIENT)
Dept: ADMINISTRATIVE | Facility: OTHER | Age: 60
End: 2020-12-25

## 2020-12-25 ENCOUNTER — NURSE TRIAGE (OUTPATIENT)
Dept: ADMINISTRATIVE | Facility: CLINIC | Age: 60
End: 2020-12-25

## 2020-12-25 ENCOUNTER — TELEPHONE (OUTPATIENT)
Dept: ADMINISTRATIVE | Facility: CLINIC | Age: 60
End: 2020-12-25

## 2020-12-25 NOTE — TELEPHONE ENCOUNTER
Called patient due to RN escalation in COVID Surveillance program. Pt escalated due to SOB    Patient location: [place]    Vitals: Sp02 : 93%. P:56. Temp: 97.3  Ambulatory Sp02 on the phone (if applicable): 94%      60 y.o. male with pertinent PMHx Afib HLD HTN on day 14 of Covid symptoms. Positive Covid screen 12/22/20. CXR on 12/22/20. Home oxygen: no. COVID-19 Hospitalization History: yes 12/22- 12/24    HPI:  59 yo male PMHX of above just released from hospital yesterday.  Weaned off oxygen in hospital only on ventolin inhaler maintained at 93-96 without O2 .  States just a bit Sob with movement as just released.  States was up making breakfast and washing dishes.      ROS: Denies worsening cough, light headedness, fever, chills, diaphoresis, chest pain, abdominal pain, emesis, diarrhea or further symptoms.     Assessment: Vitals appear as above. During phone call, patient appears alert and oriented. Able to speak in full sentences without difficulty. No audible wheezing heard during call.    Plan:  Continue monitoring advised to call if any worsening.  Reviewed with patient the reasons for seeking emergency care. Pt aware that if Sp02 <94% or if they have any worsening symptoms, they need to go to the emergency department. If they are having a medical emergency, they will call 911. Otherwise, patient will continue to submit data as scheduled. Reviewed importance of wearing mask if self or family members leave the house.     Advised next steps: Continue care at home

## 2020-12-25 NOTE — TELEPHONE ENCOUNTER
Pt called and he said that he was having a little SOB he said sometimes he has to stop talking to catch breath but doing much better. Pt rechecked vital and O2 sat went to 96 and [place] 73. He will be sent to [facility] for SOB but he sound good    Reason for Disposition   MILD difficulty breathing (e.g., minimal/no SOB at rest, SOB with walking, pulse <100)    Additional Information   Negative: SEVERE difficulty breathing (e.g., struggling for each breath, speaks in single words)   Negative: Difficult to awaken or acting confused (e.g., disoriented, slurred speech)   Negative: Bluish (or gray) lips or face now   Negative: Shock suspected (e.g., cold/pale/clammy skin, too weak to stand, low BP, rapid pulse)   Negative: Sounds like a life-threatening emergency to the triager   Negative: [1] COVID-19 exposure AND [2] no symptoms   Negative: [1] Lives with someone known to have influenza (flu test positive) AND [2] flu-like symptoms (e.g., cough, runny nose, sore throat, SOB; with or without fever)   Negative: [1] Adult with possible COVID-19 symptoms AND [2] triager concerned about severity of symptoms or other causes   Negative: Immunization reaction suspected (e.g., fever, headache, muscle aches occurring during days 1-3 days after immunization)   Negative: COVID-19 and breastfeeding, questions about   Negative: SEVERE or constant chest pain or pressure (Exception: mild central chest pain, present only when coughing)   Negative: MODERATE difficulty breathing (e.g., speaks in phrases, SOB even at rest, pulse 100-120)   Negative: [1] Headache AND [2] stiff neck (can't touch chin to chest)    Protocols used: CORONAVIRUS (COVID-19) DIAGNOSED OR [id number]

## 2020-12-26 ENCOUNTER — PATIENT MESSAGE (OUTPATIENT)
Dept: ADMINISTRATIVE | Facility: OTHER | Age: 60
End: 2020-12-26

## 2020-12-27 ENCOUNTER — PATIENT MESSAGE (OUTPATIENT)
Dept: ADMINISTRATIVE | Facility: OTHER | Age: 60
End: 2020-12-27

## 2020-12-28 ENCOUNTER — PATIENT MESSAGE (OUTPATIENT)
Dept: ADMINISTRATIVE | Facility: OTHER | Age: 60
End: 2020-12-28

## 2020-12-29 ENCOUNTER — PATIENT MESSAGE (OUTPATIENT)
Dept: ADMINISTRATIVE | Facility: OTHER | Age: 60
End: 2020-12-29

## 2020-12-30 ENCOUNTER — PATIENT MESSAGE (OUTPATIENT)
Dept: ADMINISTRATIVE | Facility: OTHER | Age: 60
End: 2020-12-30

## 2020-12-31 ENCOUNTER — PATIENT MESSAGE (OUTPATIENT)
Dept: ADMINISTRATIVE | Facility: OTHER | Age: 60
End: 2020-12-31

## 2021-01-01 ENCOUNTER — PATIENT MESSAGE (OUTPATIENT)
Dept: ADMINISTRATIVE | Facility: OTHER | Age: 61
End: 2021-01-01

## 2021-01-02 ENCOUNTER — PATIENT MESSAGE (OUTPATIENT)
Dept: ADMINISTRATIVE | Facility: OTHER | Age: 61
End: 2021-01-02

## 2021-01-03 ENCOUNTER — PATIENT MESSAGE (OUTPATIENT)
Dept: ADMINISTRATIVE | Facility: OTHER | Age: 61
End: 2021-01-03

## 2021-01-04 ENCOUNTER — PATIENT MESSAGE (OUTPATIENT)
Dept: ADMINISTRATIVE | Facility: OTHER | Age: 61
End: 2021-01-04

## 2021-01-04 ENCOUNTER — PATIENT MESSAGE (OUTPATIENT)
Dept: ADMINISTRATIVE | Facility: CLINIC | Age: 61
End: 2021-01-04

## 2021-01-05 ENCOUNTER — PATIENT MESSAGE (OUTPATIENT)
Dept: ADMINISTRATIVE | Facility: OTHER | Age: 61
End: 2021-01-05

## 2021-01-06 ENCOUNTER — PATIENT MESSAGE (OUTPATIENT)
Dept: ADMINISTRATIVE | Facility: OTHER | Age: 61
End: 2021-01-06

## 2021-01-07 ENCOUNTER — PATIENT MESSAGE (OUTPATIENT)
Dept: ADMINISTRATIVE | Facility: OTHER | Age: 61
End: 2021-01-07

## 2021-01-13 PROBLEM — E66.811 CLASS 1 OBESITY WITH SERIOUS COMORBIDITY IN ADULT: Status: ACTIVE | Noted: 2021-01-13

## 2021-01-13 PROBLEM — E66.9 CLASS 1 OBESITY WITH SERIOUS COMORBIDITY IN ADULT: Status: ACTIVE | Noted: 2021-01-13

## 2021-01-13 PROBLEM — R79.89 TROPONIN LEVEL ELEVATED: Status: ACTIVE | Noted: 2021-01-13

## 2021-01-14 ENCOUNTER — OFFICE VISIT (OUTPATIENT)
Dept: CARDIOLOGY | Facility: CLINIC | Age: 61
End: 2021-01-14
Payer: COMMERCIAL

## 2021-01-14 VITALS
HEART RATE: 104 BPM | BODY MASS INDEX: 36.55 KG/M2 | SYSTOLIC BLOOD PRESSURE: 140 MMHG | DIASTOLIC BLOOD PRESSURE: 80 MMHG | WEIGHT: 219.38 LBS | OXYGEN SATURATION: 95 % | HEIGHT: 65 IN

## 2021-01-14 DIAGNOSIS — E55.9 VITAMIN D DEFICIENCY: ICD-10-CM

## 2021-01-14 DIAGNOSIS — E78.00 PURE HYPERCHOLESTEROLEMIA: Chronic | ICD-10-CM

## 2021-01-14 DIAGNOSIS — Z13.6 ENCOUNTER FOR SCREENING FOR CARDIOVASCULAR DISORDERS: ICD-10-CM

## 2021-01-14 DIAGNOSIS — I48.0 PAROXYSMAL ATRIAL FIBRILLATION WITH RAPID VENTRICULAR RESPONSE: Primary | Chronic | ICD-10-CM

## 2021-01-14 DIAGNOSIS — U07.1 COVID-19: ICD-10-CM

## 2021-01-14 DIAGNOSIS — R73.01 IMPAIRED FASTING GLUCOSE: ICD-10-CM

## 2021-01-14 DIAGNOSIS — M15.3 POST-TRAUMATIC OSTEOARTHRITIS OF MULTIPLE JOINTS: Chronic | ICD-10-CM

## 2021-01-14 DIAGNOSIS — I10 ESSENTIAL HYPERTENSION: Chronic | ICD-10-CM

## 2021-01-14 DIAGNOSIS — R79.89 TROPONIN LEVEL ELEVATED: ICD-10-CM

## 2021-01-14 DIAGNOSIS — Z78.9 IMPAIRED MOBILITY AND ADLS: ICD-10-CM

## 2021-01-14 DIAGNOSIS — E66.01 CLASS 2 SEVERE OBESITY DUE TO EXCESS CALORIES WITH SERIOUS COMORBIDITY AND BODY MASS INDEX (BMI) OF 36.0 TO 36.9 IN ADULT: ICD-10-CM

## 2021-01-14 DIAGNOSIS — Z74.09 IMPAIRED MOBILITY AND ADLS: ICD-10-CM

## 2021-01-14 PROBLEM — E66.812 CLASS 2 SEVERE OBESITY DUE TO EXCESS CALORIES WITH SERIOUS COMORBIDITY AND BODY MASS INDEX (BMI) OF 36.0 TO 36.9 IN ADULT: Status: ACTIVE | Noted: 2021-01-14

## 2021-01-14 PROCEDURE — 99999 PR PBB SHADOW E&M-EST. PATIENT-LVL V: CPT | Mod: PBBFAC,,, | Performed by: INTERNAL MEDICINE

## 2021-01-14 PROCEDURE — 99999 PR PBB SHADOW E&M-EST. PATIENT-LVL V: ICD-10-PCS | Mod: PBBFAC,,, | Performed by: INTERNAL MEDICINE

## 2021-01-14 PROCEDURE — 99204 OFFICE O/P NEW MOD 45 MIN: CPT | Mod: S$GLB,,, | Performed by: INTERNAL MEDICINE

## 2021-01-14 PROCEDURE — 99204 PR OFFICE/OUTPT VISIT, NEW, LEVL IV, 45-59 MIN: ICD-10-PCS | Mod: S$GLB,,, | Performed by: INTERNAL MEDICINE

## 2021-01-14 RX ORDER — IBUPROFEN 600 MG/1
600 TABLET ORAL EVERY 4 HOURS PRN
COMMUNITY
End: 2022-06-07

## 2021-01-21 ENCOUNTER — HOSPITAL ENCOUNTER (OUTPATIENT)
Dept: RADIOLOGY | Facility: HOSPITAL | Age: 61
Discharge: HOME | End: 2021-01-21
Attending: INTERNAL MEDICINE
Payer: COMMERCIAL

## 2021-01-21 DIAGNOSIS — I10 ESSENTIAL HYPERTENSION: ICD-10-CM

## 2021-01-21 DIAGNOSIS — Z13.6 ENCOUNTER FOR SCREENING FOR CARDIOVASCULAR DISORDERS: ICD-10-CM

## 2021-01-21 DIAGNOSIS — E78.00 PURE HYPERCHOLESTEROLEMIA: ICD-10-CM

## 2021-01-21 DIAGNOSIS — R79.89 TROPONIN LEVEL ELEVATED: ICD-10-CM

## 2021-01-21 PROCEDURE — 75571 CT CALCIUM SCORING CARDIAC: ICD-10-PCS | Mod: 26,,, | Performed by: RADIOLOGY

## 2021-01-21 PROCEDURE — 75571 CT HRT W/O DYE W/CA TEST: CPT | Mod: 26,,, | Performed by: RADIOLOGY

## 2021-01-21 PROCEDURE — 75571 CT HRT W/O DYE W/CA TEST: CPT | Mod: TC

## 2021-01-22 PROBLEM — R93.1 AGATSTON CAC SCORE, <100: Status: ACTIVE | Noted: 2021-01-22

## 2021-02-26 ENCOUNTER — PATIENT MESSAGE (OUTPATIENT)
Dept: ADMINISTRATIVE | Facility: OTHER | Age: 61
End: 2021-02-26

## 2021-02-26 ENCOUNTER — NURSE TRIAGE (OUTPATIENT)
Dept: ADMINISTRATIVE | Facility: CLINIC | Age: 61
End: 2021-02-26

## 2021-03-01 ENCOUNTER — PATIENT MESSAGE (OUTPATIENT)
Dept: CARDIOLOGY | Facility: CLINIC | Age: 61
End: 2021-03-01

## 2021-03-05 ENCOUNTER — IMMUNIZATION (OUTPATIENT)
Dept: PRIMARY CARE CLINIC | Facility: CLINIC | Age: 61
End: 2021-03-05
Payer: COMMERCIAL

## 2021-03-05 DIAGNOSIS — Z23 NEED FOR VACCINATION: Primary | ICD-10-CM

## 2021-03-05 PROCEDURE — 91303 PR SARSCOV2 VAC AD26 .5ML IM: ICD-10-PCS | Mod: S$GLB,,, | Performed by: INTERNAL MEDICINE

## 2021-03-05 PROCEDURE — 0031A PR IMMUNIZ ADMIN, SARS-COV-2 COVID-19 VACC, 5X10VP/0.5ML: CPT | Mod: CV19,S$GLB,, | Performed by: INTERNAL MEDICINE

## 2021-03-05 PROCEDURE — 0031A PR IMMUNIZ ADMIN, SARS-COV-2 COVID-19 VACC, 5X10VP/0.5ML: ICD-10-PCS | Mod: CV19,S$GLB,, | Performed by: INTERNAL MEDICINE

## 2021-03-05 PROCEDURE — 91303 PR SARSCOV2 VAC AD26 .5ML IM: CPT | Mod: S$GLB,,, | Performed by: INTERNAL MEDICINE

## 2021-10-11 ENCOUNTER — OFFICE VISIT (OUTPATIENT)
Dept: URGENT CARE | Facility: CLINIC | Age: 61
End: 2021-10-11
Payer: COMMERCIAL

## 2021-10-11 VITALS
HEIGHT: 65 IN | SYSTOLIC BLOOD PRESSURE: 152 MMHG | DIASTOLIC BLOOD PRESSURE: 104 MMHG | TEMPERATURE: 100 F | OXYGEN SATURATION: 94 % | RESPIRATION RATE: 16 BRPM | HEART RATE: 98 BPM | BODY MASS INDEX: 35.82 KG/M2 | WEIGHT: 215 LBS

## 2021-10-11 DIAGNOSIS — J06.9 VIRAL URI WITH COUGH: Primary | ICD-10-CM

## 2021-10-11 LAB
CTP QC/QA: YES
SARS-COV-2 RDRP RESP QL NAA+PROBE: NEGATIVE

## 2021-10-11 PROCEDURE — 99213 PR OFFICE/OUTPT VISIT, EST, LEVL III, 20-29 MIN: ICD-10-PCS | Mod: S$GLB,CS,, | Performed by: STUDENT IN AN ORGANIZED HEALTH CARE EDUCATION/TRAINING PROGRAM

## 2021-10-11 PROCEDURE — U0002 COVID-19 LAB TEST NON-CDC: HCPCS | Mod: QW,S$GLB,, | Performed by: STUDENT IN AN ORGANIZED HEALTH CARE EDUCATION/TRAINING PROGRAM

## 2021-10-11 PROCEDURE — 99213 OFFICE O/P EST LOW 20 MIN: CPT | Mod: S$GLB,CS,, | Performed by: STUDENT IN AN ORGANIZED HEALTH CARE EDUCATION/TRAINING PROGRAM

## 2021-10-11 PROCEDURE — U0002: ICD-10-PCS | Mod: QW,S$GLB,, | Performed by: STUDENT IN AN ORGANIZED HEALTH CARE EDUCATION/TRAINING PROGRAM

## 2021-11-04 ENCOUNTER — IMMUNIZATION (OUTPATIENT)
Dept: INTERNAL MEDICINE | Facility: CLINIC | Age: 61
End: 2021-11-04
Payer: COMMERCIAL

## 2021-11-04 DIAGNOSIS — Z23 NEED FOR VACCINATION: Primary | ICD-10-CM

## 2021-11-04 PROCEDURE — 0034A COVID-19,VECTOR-NR,RS-AD26,PF,0.5 ML DOSE VACCINE (JANSSEN): CPT | Mod: CV19,PBBFAC | Performed by: INTERNAL MEDICINE

## 2021-11-04 PROCEDURE — 91303 COVID-19,VECTOR-NR,RS-AD26,PF,0.5 ML DOSE VACCINE (JANSSEN): CPT | Mod: PBBFAC | Performed by: INTERNAL MEDICINE

## 2022-06-07 ENCOUNTER — OFFICE VISIT (OUTPATIENT)
Dept: INTERNAL MEDICINE | Facility: CLINIC | Age: 62
End: 2022-06-07
Payer: COMMERCIAL

## 2022-06-07 VITALS
DIASTOLIC BLOOD PRESSURE: 80 MMHG | HEART RATE: 72 BPM | WEIGHT: 222.69 LBS | SYSTOLIC BLOOD PRESSURE: 128 MMHG | BODY MASS INDEX: 37.1 KG/M2 | HEIGHT: 65 IN

## 2022-06-07 DIAGNOSIS — R73.9 HYPERGLYCEMIA: ICD-10-CM

## 2022-06-07 DIAGNOSIS — I10 ESSENTIAL HYPERTENSION: ICD-10-CM

## 2022-06-07 DIAGNOSIS — E78.5 HYPERLIPIDEMIA, UNSPECIFIED HYPERLIPIDEMIA TYPE: ICD-10-CM

## 2022-06-07 DIAGNOSIS — Z12.5 PROSTATE CANCER SCREENING: ICD-10-CM

## 2022-06-07 DIAGNOSIS — Q74.3 ARTHROGRYPOSIS MULTIPLEX CONGENITA: ICD-10-CM

## 2022-06-07 DIAGNOSIS — I48.0 PAF (PAROXYSMAL ATRIAL FIBRILLATION): ICD-10-CM

## 2022-06-07 DIAGNOSIS — Z00.00 ANNUAL PHYSICAL EXAM: Primary | ICD-10-CM

## 2022-06-07 PROCEDURE — 99999 PR PBB SHADOW E&M-EST. PATIENT-LVL III: ICD-10-PCS | Mod: PBBFAC,,, | Performed by: INTERNAL MEDICINE

## 2022-06-07 PROCEDURE — 99396 PREV VISIT EST AGE 40-64: CPT | Mod: S$GLB,,, | Performed by: INTERNAL MEDICINE

## 2022-06-07 PROCEDURE — 99999 PR PBB SHADOW E&M-EST. PATIENT-LVL III: CPT | Mod: PBBFAC,,, | Performed by: INTERNAL MEDICINE

## 2022-06-07 PROCEDURE — 99396 PR PREVENTIVE VISIT,EST,40-64: ICD-10-PCS | Mod: S$GLB,,, | Performed by: INTERNAL MEDICINE

## 2022-06-07 NOTE — PROGRESS NOTES
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  Aspirin 81 mg daily  Atorvastatin 20 mg daily  Mavik 2 mg daily  Losartan 100 mg daily  Vitamin-D 2000 units daily    Social history  Tobacco use none, alcohol use limited  Exercise by running a recumbent bike    Family history   Mother hypertension  Father cardiovascular disease  Two brothers no health conditions    Screening colonoscopy age 53 colon polyps  Colonoscopy age 58      61-year-old male  Presents for an annual routine visit establish care    He has diagnosis of arthrogryposis with multiple surgical procedures     He has also had 2 bouts of atrial fibrillation with rapid ventricular response.  In 2019 after having right total hip arthroplasty and then being placed on methocarbamol.  And in December 2020 when he had fever associated of COVID-19.      Review of symptoms  Negative for chest pain, palpitations, shortness of breath, abdominal pain.  Regular bowel function.  No difficulty urinating, nocturia x1.  No indigestion, heartburn headaches.    Will have arthralgia bur at present is doing well with the use of Mobic 7.5 mg daily      Examination  Weight 222 lb  Pulse 72  Blood pressure 128/80  HEENT exam no abnormal findings  Neck no thyromegaly no masses  Chest clear breath sounds  Heart regular rate rhythm  Abdominal exam is bowel sounds soft nontender no hepatosplenomegaly abdominal masses  Pulses 2+ carotid pulses no bruits 2+ pedal pulses  Extremities no edema  Lymph gland palpable adenopathy  Genitalia no scrotal masses no hernias  Digital rectal exam  stool is brown prostate minimally enlarged  Musculoskeletal contraction deformity involving the elbows and ankles.    Impression  General examination  Hypertension  Hyperlipidemia  Paroxysmal atrial fibrillation  Hyperglycemia  Arthrogryposis    Plan routine labs  Colonoscopy in 1-2 years  Continue with attention a proper diet physical activity

## 2022-06-08 ENCOUNTER — LAB VISIT (OUTPATIENT)
Dept: LAB | Facility: HOSPITAL | Age: 62
End: 2022-06-08
Attending: INTERNAL MEDICINE
Payer: COMMERCIAL

## 2022-06-08 DIAGNOSIS — I48.0 PAF (PAROXYSMAL ATRIAL FIBRILLATION): ICD-10-CM

## 2022-06-08 DIAGNOSIS — Z12.5 PROSTATE CANCER SCREENING: ICD-10-CM

## 2022-06-08 DIAGNOSIS — Z00.00 ANNUAL PHYSICAL EXAM: ICD-10-CM

## 2022-06-08 DIAGNOSIS — E78.5 HYPERLIPIDEMIA, UNSPECIFIED HYPERLIPIDEMIA TYPE: ICD-10-CM

## 2022-06-08 DIAGNOSIS — R73.9 HYPERGLYCEMIA: ICD-10-CM

## 2022-06-08 DIAGNOSIS — I10 ESSENTIAL HYPERTENSION: ICD-10-CM

## 2022-06-08 LAB
25(OH)D3+25(OH)D2 SERPL-MCNC: 53 NG/ML (ref 30–96)
ALBUMIN SERPL BCP-MCNC: 3.8 G/DL (ref 3.5–5.2)
ALP SERPL-CCNC: 88 U/L (ref 55–135)
ALT SERPL W/O P-5'-P-CCNC: 48 U/L (ref 10–44)
ANION GAP SERPL CALC-SCNC: 12 MMOL/L (ref 8–16)
AST SERPL-CCNC: 41 U/L (ref 10–40)
BASOPHILS # BLD AUTO: 0.03 K/UL (ref 0–0.2)
BASOPHILS NFR BLD: 0.6 % (ref 0–1.9)
BILIRUB SERPL-MCNC: 0.9 MG/DL (ref 0.1–1)
BUN SERPL-MCNC: 17 MG/DL (ref 6–20)
CALCIUM SERPL-MCNC: 8.9 MG/DL (ref 8.7–10.5)
CHLORIDE SERPL-SCNC: 106 MMOL/L (ref 95–110)
CHOLEST SERPL-MCNC: 126 MG/DL (ref 120–199)
CHOLEST/HDLC SERPL: 3.8 {RATIO} (ref 2–5)
CO2 SERPL-SCNC: 21 MMOL/L (ref 23–29)
COMPLEXED PSA SERPL-MCNC: 0.66 NG/ML (ref 0–4)
CREAT SERPL-MCNC: 0.7 MG/DL (ref 0.5–1.4)
DIFFERENTIAL METHOD: NORMAL
EOSINOPHIL # BLD AUTO: 0.2 K/UL (ref 0–0.5)
EOSINOPHIL NFR BLD: 3.9 % (ref 0–8)
ERYTHROCYTE [DISTWIDTH] IN BLOOD BY AUTOMATED COUNT: 13.5 % (ref 11.5–14.5)
EST. GFR  (AFRICAN AMERICAN): >60 ML/MIN/1.73 M^2
EST. GFR  (NON AFRICAN AMERICAN): >60 ML/MIN/1.73 M^2
ESTIMATED AVG GLUCOSE: 117 MG/DL (ref 68–131)
GLUCOSE SERPL-MCNC: 107 MG/DL (ref 70–110)
HBA1C MFR BLD: 5.7 % (ref 4–5.6)
HCT VFR BLD AUTO: 48.5 % (ref 40–54)
HDLC SERPL-MCNC: 33 MG/DL (ref 40–75)
HDLC SERPL: 26.2 % (ref 20–50)
HGB BLD-MCNC: 15.8 G/DL (ref 14–18)
IMM GRANULOCYTES # BLD AUTO: 0.01 K/UL (ref 0–0.04)
IMM GRANULOCYTES NFR BLD AUTO: 0.2 % (ref 0–0.5)
LDLC SERPL CALC-MCNC: 71 MG/DL (ref 63–159)
LYMPHOCYTES # BLD AUTO: 1.8 K/UL (ref 1–4.8)
LYMPHOCYTES NFR BLD: 34.7 % (ref 18–48)
MCH RBC QN AUTO: 29.2 PG (ref 27–31)
MCHC RBC AUTO-ENTMCNC: 32.6 G/DL (ref 32–36)
MCV RBC AUTO: 90 FL (ref 82–98)
MONOCYTES # BLD AUTO: 0.6 K/UL (ref 0.3–1)
MONOCYTES NFR BLD: 10.6 % (ref 4–15)
NEUTROPHILS # BLD AUTO: 2.6 K/UL (ref 1.8–7.7)
NEUTROPHILS NFR BLD: 50 % (ref 38–73)
NONHDLC SERPL-MCNC: 93 MG/DL
NRBC BLD-RTO: 0 /100 WBC
PLATELET # BLD AUTO: 211 K/UL (ref 150–450)
PMV BLD AUTO: 9.5 FL (ref 9.2–12.9)
POTASSIUM SERPL-SCNC: 3.8 MMOL/L (ref 3.5–5.1)
PROT SERPL-MCNC: 6.7 G/DL (ref 6–8.4)
RBC # BLD AUTO: 5.42 M/UL (ref 4.6–6.2)
SODIUM SERPL-SCNC: 139 MMOL/L (ref 136–145)
TRIGL SERPL-MCNC: 110 MG/DL (ref 30–150)
TSH SERPL DL<=0.005 MIU/L-ACNC: 1.34 UIU/ML (ref 0.4–4)
WBC # BLD AUTO: 5.19 K/UL (ref 3.9–12.7)

## 2022-06-08 PROCEDURE — 80061 LIPID PANEL: CPT | Performed by: INTERNAL MEDICINE

## 2022-06-08 PROCEDURE — 84153 ASSAY OF PSA TOTAL: CPT | Performed by: INTERNAL MEDICINE

## 2022-06-08 PROCEDURE — 85025 COMPLETE CBC W/AUTO DIFF WBC: CPT | Performed by: INTERNAL MEDICINE

## 2022-06-08 PROCEDURE — 82306 VITAMIN D 25 HYDROXY: CPT | Performed by: INTERNAL MEDICINE

## 2022-06-08 PROCEDURE — 36415 COLL VENOUS BLD VENIPUNCTURE: CPT | Performed by: INTERNAL MEDICINE

## 2022-06-08 PROCEDURE — 80053 COMPREHEN METABOLIC PANEL: CPT | Performed by: INTERNAL MEDICINE

## 2022-06-08 PROCEDURE — 83036 HEMOGLOBIN GLYCOSYLATED A1C: CPT | Performed by: INTERNAL MEDICINE

## 2022-06-08 PROCEDURE — 84443 ASSAY THYROID STIM HORMONE: CPT | Performed by: INTERNAL MEDICINE

## 2022-06-09 ENCOUNTER — PATIENT MESSAGE (OUTPATIENT)
Dept: INTERNAL MEDICINE | Facility: CLINIC | Age: 62
End: 2022-06-09
Payer: COMMERCIAL

## 2022-06-09 DIAGNOSIS — E78.5 HYPERLIPIDEMIA, UNSPECIFIED HYPERLIPIDEMIA TYPE: ICD-10-CM

## 2022-06-09 DIAGNOSIS — Z11.59 ENCOUNTER FOR HEPATITIS C SCREENING TEST FOR LOW RISK PATIENT: ICD-10-CM

## 2022-06-09 DIAGNOSIS — I10 ESSENTIAL HYPERTENSION: Primary | ICD-10-CM

## 2022-06-09 DIAGNOSIS — R73.9 HYPERGLYCEMIA: ICD-10-CM

## 2022-06-09 NOTE — PROGRESS NOTES
Test results reviewed with the patient.  All look fine.  AST/ALT is minimally elevated but improved over time and probably consistent with non alcoholic for hepatic steatosis.  Continue to be attentive to proper diet, physical activity, weight management in which he is doing.  Return visit 6 months

## 2022-07-06 NOTE — PLAN OF CARE
Problem: Physical Therapy Goal  Goal: Physical Therapy Goal  Goals to be met by: 6/3/19     Patient will increase functional independence with mobility by performin. Supine to sit with supervision.   2. Sit to supine with supervision.   3. Sit<>stand transfer with supervision using rolling walker.   4. Gait > 150 feet with SBA using rolling walker.   5. Ascend/descend 9 stairs with right handrails with CGA with least restrictive AD.     Outcome: Ongoing (interventions implemented as appropriate)  Pt standing in restroom speaking with nurse Hughes upon arrival. Pt agreeable to gait, but requested to hold off on other interventions until PM session due to reports of significant pain in R LE (may be from compensating for sx on L LE). Pt ambulated 40ft & requested to sit- bedside chair brought behind pt to sit. Pt left seated in bedside chair with LE elevated & ice packs applied to R hip & knee.       What Type Of Note Output Would You Prefer (Optional)?: Standard Output How Severe Is Your Skin Lesion?: mild Has Your Skin Lesion Been Treated?: not been treated Is This A New Presentation, Or A Follow-Up?: Skin Lesions

## 2022-07-18 ENCOUNTER — PATIENT MESSAGE (OUTPATIENT)
Dept: INTERNAL MEDICINE | Facility: CLINIC | Age: 62
End: 2022-07-18
Payer: COMMERCIAL

## 2022-07-21 ENCOUNTER — OFFICE VISIT (OUTPATIENT)
Dept: INTERNAL MEDICINE | Facility: CLINIC | Age: 62
End: 2022-07-21
Payer: COMMERCIAL

## 2022-07-21 VITALS
WEIGHT: 222.44 LBS | OXYGEN SATURATION: 97 % | HEIGHT: 65 IN | BODY MASS INDEX: 37.06 KG/M2 | HEART RATE: 67 BPM | SYSTOLIC BLOOD PRESSURE: 134 MMHG | DIASTOLIC BLOOD PRESSURE: 82 MMHG

## 2022-07-21 DIAGNOSIS — L30.9 DERMATITIS: Primary | ICD-10-CM

## 2022-07-21 PROCEDURE — 99211 OFF/OP EST MAY X REQ PHY/QHP: CPT | Mod: S$GLB,,, | Performed by: INTERNAL MEDICINE

## 2022-07-21 PROCEDURE — 99999 PR PBB SHADOW E&M-EST. PATIENT-LVL V: ICD-10-PCS | Mod: PBBFAC,,, | Performed by: INTERNAL MEDICINE

## 2022-07-21 PROCEDURE — 99999 PR PBB SHADOW E&M-EST. PATIENT-LVL V: CPT | Mod: PBBFAC,,, | Performed by: INTERNAL MEDICINE

## 2022-07-21 PROCEDURE — 99211 PR OFFICE/OUTPT VISIT, EST, LEVL I: ICD-10-PCS | Mod: S$GLB,,, | Performed by: INTERNAL MEDICINE

## 2022-07-21 RX ORDER — BETAMETHASONE DIPROPIONATE 0.5 MG/G
OINTMENT TOPICAL 2 TIMES DAILY
Qty: 45 G | Refills: 0 | Status: SHIPPED | OUTPATIENT
Start: 2022-07-21

## 2022-07-21 NOTE — PROGRESS NOTES
62-year-old  Male      For 1 month he has noticed a couple irritated spots on his right arm.  He has put regular hand lotion on it.  There has been no change.  Does not hurt.  May have increased in size minimally.  He is in the sun a lot  Examination  Vital signs per epic  Blood pressure by me 134/82    There is to small slightly keratotic erosions.    No induration or fluctuance    Impression not Pacific dermatitis    Plan Diprolene ointment apply twice a day but if there is no improvement referral to Dermatology

## 2022-08-29 ENCOUNTER — TELEPHONE (OUTPATIENT)
Dept: INTERNAL MEDICINE | Facility: CLINIC | Age: 62
End: 2022-08-29
Payer: COMMERCIAL

## 2022-08-31 ENCOUNTER — DOCUMENTATION ONLY (OUTPATIENT)
Dept: INTERNAL MEDICINE | Facility: CLINIC | Age: 62
End: 2022-08-31
Payer: COMMERCIAL

## 2022-08-31 ENCOUNTER — OFFICE VISIT (OUTPATIENT)
Dept: INTERNAL MEDICINE | Facility: CLINIC | Age: 62
End: 2022-08-31
Payer: COMMERCIAL

## 2022-08-31 DIAGNOSIS — U07.1 COVID-19 VIRUS DETECTED: ICD-10-CM

## 2022-08-31 DIAGNOSIS — J06.9 VIRAL URI WITH COUGH: Primary | ICD-10-CM

## 2022-08-31 LAB
SARS-COV-2 RNA RESP QL NAA+PROBE: DETECTED
SARS-COV-2- CYCLE NUMBER: 16

## 2022-08-31 PROCEDURE — U0003 INFECTIOUS AGENT DETECTION BY NUCLEIC ACID (DNA OR RNA); SEVERE ACUTE RESPIRATORY SYNDROME CORONAVIRUS 2 (SARS-COV-2) (CORONAVIRUS DISEASE [COVID-19]), AMPLIFIED PROBE TECHNIQUE, MAKING USE OF HIGH THROUGHPUT TECHNOLOGIES AS DESCRIBED BY CMS-2020-01-R: HCPCS | Performed by: INTERNAL MEDICINE

## 2022-08-31 PROCEDURE — 99999 PR PBB SHADOW E&M-EST. PATIENT-LVL IV: ICD-10-PCS | Mod: PBBFAC,,, | Performed by: INTERNAL MEDICINE

## 2022-08-31 PROCEDURE — 99214 OFFICE O/P EST MOD 30 MIN: CPT | Mod: S$GLB,,, | Performed by: INTERNAL MEDICINE

## 2022-08-31 PROCEDURE — 99999 PR PBB SHADOW E&M-EST. PATIENT-LVL IV: CPT | Mod: PBBFAC,,, | Performed by: INTERNAL MEDICINE

## 2022-08-31 PROCEDURE — U0005 INFEC AGEN DETEC AMPLI PROBE: HCPCS | Performed by: INTERNAL MEDICINE

## 2022-08-31 PROCEDURE — 99214 PR OFFICE/OUTPT VISIT, EST, LEVL IV, 30-39 MIN: ICD-10-PCS | Mod: S$GLB,,, | Performed by: INTERNAL MEDICINE

## 2022-08-31 NOTE — PROGRESS NOTES
Internal medicine note       Calcium score reading was 157 consists with moderate plaque burden.  It was elected to restart the medication pravastatin but at 20 mg a day.  He has 40 mg tablets at home which she has left over from before.  He had myalgias with the use of pravastatin at 40 mg but not that at 20 mg.  In addition many years ago he attempted Crestor but remembers having problems with constipation and at present defers on this, however another option could have been a lower dose of 5 - 10 mg daily

## 2022-08-31 NOTE — PROGRESS NOTES
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  Aspirin 81 mg daily  Atorvastatin 20 mg daily  Mavik 2 mg daily  Losartan 100 mg daily  Vitamin-D 2000 units daily      62-year-old male   3 days ago started having a nonproductive cough on August 28th then later in the evening was feeling bad and weak with feverish feeling along with chills and sweats.    The next morning he had a fever of 101.7, chills and sweats and had 3 episodes of vomiting.  Start taking Tylenol to break the fever.    The next day which was yesterday he did have a fever in the morning was feeling generally better related in the afternoon in evening started having fever again of 101.  He had 1 episode of vomiting.    During this time has been no shortness of breath, chest pain, abdominal pain, diarrhea and no difficulty urinating.  Some nasal congestion.  No sore throat ear pain.  He still has a cough but is nonproductive    Examination   Weight 213 lb  Pulse 94   Blood pressure by me 128/88  Tympanic membranes normal   Nasal mucosa is clear  Oropharynx slightly hyperemic   Chest clear breath sounds no wheezes or rales   Heart regular rate rhythm   Abdominal exam is bowel sounds soft nontender    Impression   Viral upper respiratory infection  With  Cough    Plan   COVID-19 swab   Mucinex DM  Continue with the use of Tylenol

## 2022-09-01 VITALS
WEIGHT: 213 LBS | OXYGEN SATURATION: 97 % | HEIGHT: 65 IN | SYSTOLIC BLOOD PRESSURE: 148 MMHG | DIASTOLIC BLOOD PRESSURE: 90 MMHG | HEART RATE: 95 BPM | TEMPERATURE: 98 F | BODY MASS INDEX: 35.49 KG/M2

## 2022-10-26 ENCOUNTER — PATIENT MESSAGE (OUTPATIENT)
Dept: RESEARCH | Facility: HOSPITAL | Age: 62
End: 2022-10-26
Payer: COMMERCIAL

## 2022-12-02 ENCOUNTER — LAB VISIT (OUTPATIENT)
Dept: LAB | Facility: HOSPITAL | Age: 62
End: 2022-12-02
Attending: INTERNAL MEDICINE
Payer: COMMERCIAL

## 2022-12-02 DIAGNOSIS — Z11.59 ENCOUNTER FOR HEPATITIS C SCREENING TEST FOR LOW RISK PATIENT: ICD-10-CM

## 2022-12-02 DIAGNOSIS — I10 ESSENTIAL HYPERTENSION: ICD-10-CM

## 2022-12-02 DIAGNOSIS — E78.5 HYPERLIPIDEMIA, UNSPECIFIED HYPERLIPIDEMIA TYPE: ICD-10-CM

## 2022-12-02 DIAGNOSIS — R73.9 HYPERGLYCEMIA: ICD-10-CM

## 2022-12-02 LAB
ALBUMIN SERPL BCP-MCNC: 3.8 G/DL (ref 3.5–5.2)
ALP SERPL-CCNC: 92 U/L (ref 55–135)
ALT SERPL W/O P-5'-P-CCNC: 43 U/L (ref 10–44)
ANION GAP SERPL CALC-SCNC: 8 MMOL/L (ref 8–16)
AST SERPL-CCNC: 35 U/L (ref 10–40)
BILIRUB SERPL-MCNC: 0.9 MG/DL (ref 0.1–1)
BUN SERPL-MCNC: 13 MG/DL (ref 6–20)
CALCIUM SERPL-MCNC: 8.9 MG/DL (ref 8.7–10.5)
CHLORIDE SERPL-SCNC: 104 MMOL/L (ref 95–110)
CHOLEST SERPL-MCNC: 137 MG/DL (ref 120–199)
CHOLEST/HDLC SERPL: 3.8 {RATIO} (ref 2–5)
CO2 SERPL-SCNC: 22 MMOL/L (ref 23–29)
CREAT SERPL-MCNC: 0.7 MG/DL (ref 0.5–1.4)
EST. GFR  (NO RACE VARIABLE): >60 ML/MIN/1.73 M^2
ESTIMATED AVG GLUCOSE: 120 MG/DL (ref 68–131)
GLUCOSE SERPL-MCNC: 117 MG/DL (ref 70–110)
HBA1C MFR BLD: 5.8 % (ref 4–5.6)
HCV AB SERPL QL IA: NORMAL
HDLC SERPL-MCNC: 36 MG/DL (ref 40–75)
HDLC SERPL: 26.3 % (ref 20–50)
LDLC SERPL CALC-MCNC: 72.4 MG/DL (ref 63–159)
NONHDLC SERPL-MCNC: 101 MG/DL
POTASSIUM SERPL-SCNC: 3.8 MMOL/L (ref 3.5–5.1)
PROT SERPL-MCNC: 6.6 G/DL (ref 6–8.4)
SODIUM SERPL-SCNC: 134 MMOL/L (ref 136–145)
TRIGL SERPL-MCNC: 143 MG/DL (ref 30–150)

## 2022-12-02 PROCEDURE — 80061 LIPID PANEL: CPT | Performed by: INTERNAL MEDICINE

## 2022-12-02 PROCEDURE — 86803 HEPATITIS C AB TEST: CPT | Performed by: INTERNAL MEDICINE

## 2022-12-02 PROCEDURE — 36415 COLL VENOUS BLD VENIPUNCTURE: CPT | Performed by: INTERNAL MEDICINE

## 2022-12-02 PROCEDURE — 83036 HEMOGLOBIN GLYCOSYLATED A1C: CPT | Performed by: INTERNAL MEDICINE

## 2022-12-02 PROCEDURE — 80053 COMPREHEN METABOLIC PANEL: CPT | Performed by: INTERNAL MEDICINE

## 2022-12-13 ENCOUNTER — OFFICE VISIT (OUTPATIENT)
Dept: INTERNAL MEDICINE | Facility: CLINIC | Age: 62
End: 2022-12-13
Payer: COMMERCIAL

## 2022-12-13 VITALS
HEIGHT: 65 IN | OXYGEN SATURATION: 97 % | BODY MASS INDEX: 37.1 KG/M2 | HEART RATE: 92 BPM | WEIGHT: 222.69 LBS | SYSTOLIC BLOOD PRESSURE: 138 MMHG | DIASTOLIC BLOOD PRESSURE: 94 MMHG

## 2022-12-13 DIAGNOSIS — S16.1XXA STRAIN OF NECK MUSCLE, INITIAL ENCOUNTER: ICD-10-CM

## 2022-12-13 DIAGNOSIS — I10 ESSENTIAL HYPERTENSION: Primary | ICD-10-CM

## 2022-12-13 DIAGNOSIS — R73.03 PREDIABETES: ICD-10-CM

## 2022-12-13 DIAGNOSIS — I48.0 PAF (PAROXYSMAL ATRIAL FIBRILLATION): ICD-10-CM

## 2022-12-13 DIAGNOSIS — Q74.3 ARTHROGRYPOSIS MULTIPLEX CONGENITA: ICD-10-CM

## 2022-12-13 DIAGNOSIS — E78.5 HYPERLIPIDEMIA, UNSPECIFIED HYPERLIPIDEMIA TYPE: ICD-10-CM

## 2022-12-13 PROCEDURE — 99214 OFFICE O/P EST MOD 30 MIN: CPT | Mod: S$GLB,,, | Performed by: INTERNAL MEDICINE

## 2022-12-13 PROCEDURE — 99999 PR PBB SHADOW E&M-EST. PATIENT-LVL V: CPT | Mod: PBBFAC,,, | Performed by: INTERNAL MEDICINE

## 2022-12-13 PROCEDURE — 99999 PR PBB SHADOW E&M-EST. PATIENT-LVL V: ICD-10-PCS | Mod: PBBFAC,,, | Performed by: INTERNAL MEDICINE

## 2022-12-13 PROCEDURE — 99214 PR OFFICE/OUTPT VISIT, EST, LEVL IV, 30-39 MIN: ICD-10-PCS | Mod: S$GLB,,, | Performed by: INTERNAL MEDICINE

## 2022-12-13 NOTE — PROGRESS NOTES
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, knees, and elbows)  Osteoarthritis  COVID-19 infection December 2021  Bilateral elbow surgery, left ankle surgery as an infant  Right arthroscopic knee surgery  Bilateral total hip arthroplasty  Left ulnar nerve transposition    Social history  Tobacco use none, alcohol use limited  Exercise by running a recumbent bike     MEDICATIONS  Meloxicam 7.5 mg  Verapamil  mg daily  Aspirin 81 mg daily  Atorvastatin 20 mg daily  Mavik 2 mg daily  Losartan 100 mg daily  Vitamin-D 2000 units daily    Component      Latest Ref Rng & Units 12/2/2022   Sodium      136 - 145 mmol/L 134 (L)   Potassium      3.5 - 5.1 mmol/L 3.8   Chloride      95 - 110 mmol/L 104   CO2      23 - 29 mmol/L 22 (L)   Glucose      70 - 110 mg/dL 117 (H)   BUN      6 - 20 mg/dL 13   Creatinine      0.5 - 1.4 mg/dL 0.7   Calcium      8.7 - 10.5 mg/dL 8.9   PROTEIN TOTAL      6.0 - 8.4 g/dL 6.6   Albumin      3.5 - 5.2 g/dL 3.8   BILIRUBIN TOTAL      0.1 - 1.0 mg/dL 0.9   Alkaline Phosphatase      55 - 135 U/L 92   AST      10 - 40 U/L 35   ALT      10 - 44 U/L 43   ANION GAP      8 - 16 mmol/L 8   eGFR      >60 mL/min/1.73 m:2 >60.0   Cholesterol      120 - 199 mg/dL 137   Triglycerides      30 - 150 mg/dL 143   HDL      40 - 75 mg/dL 36 (L)   LDL Cholesterol External      63.0 - 159.0 mg/dL 72.4   HDL/Cholesterol Ratio      20.0 - 50.0 % 26.3   Total Cholesterol/HDL Ratio      2.0 - 5.0 3.8   Non-HDL Cholesterol      mg/dL 101   Hemoglobin A1C External      4.0 - 5.6 % 5.8 (H)   Estimated Avg Glucose      68 - 131 mg/dL 120   Hepatitis C Ab      Non-reactive Non-reactive        62-year-old male   Presents for routine follow-up  In general feels well other than for the past 2 months been having discomfort involving the left side of his neck.  He started notices when he got off a plane trip.  He  is on meloxicam 7.5 mg daily.  He has been using icy hot.  It has been effective.  The pain is still persistent but it is better from before.  There is no radicular symptoms involving  Extremities    Is noted on exam is heart rhythm is regular.  He is had 2 bouts of atrial fibrillation.  One time after taking the medicine methocarbamol as a muscle relaxant and then the other time after having fever from COVID    Review of symptoms  Negative for chest pain, palpitations, shortness of breath, abdominal pain.  Regular bowel function.  No difficulty urinating    Examination   Weight 122 lb  Pulse 92  Blood pressure fluctuated anywhere from systolic 138-148 over 84  Neck no thyromegaly no masses  Chest clear breath sounds good effort  Heart irregular regular no murmurs   Abdominal exam is bowel sounds soft nontender no masses   2+ carotid pulses no bruits  2+ pedal pulses   1+ pedal edema on the left    ECG  Atrial fibrillation with ventricular response 94    Impression   Hypertension  Hyperlipidemia  Pre diabetes  Paroxysmal atrial fibrillation, currently in atrial fibrillation  Arthrogryposis  Muscular neck pain    Plan  Follow-up visit with Cardiology/arrhythmia  Possibly use of beta-blocker may be more appropriate than verapamil  Continued use of icy hot.  Can also consider Voltaren gel  Return visit 6 months

## 2022-12-21 DIAGNOSIS — I48.91 ATRIAL FIBRILLATION, UNSPECIFIED TYPE: Primary | ICD-10-CM

## 2022-12-22 ENCOUNTER — HOSPITAL ENCOUNTER (OUTPATIENT)
Dept: CARDIOLOGY | Facility: CLINIC | Age: 62
Discharge: HOME | End: 2022-12-22
Payer: COMMERCIAL

## 2022-12-22 ENCOUNTER — OFFICE VISIT (OUTPATIENT)
Dept: ELECTROPHYSIOLOGY | Facility: CLINIC | Age: 62
End: 2022-12-22
Payer: COMMERCIAL

## 2022-12-22 VITALS
HEIGHT: 65 IN | SYSTOLIC BLOOD PRESSURE: 157 MMHG | DIASTOLIC BLOOD PRESSURE: 91 MMHG | WEIGHT: 222.88 LBS | HEART RATE: 83 BPM | BODY MASS INDEX: 37.13 KG/M2

## 2022-12-22 DIAGNOSIS — I48.0 PAROXYSMAL ATRIAL FIBRILLATION WITH RAPID VENTRICULAR RESPONSE: Primary | Chronic | ICD-10-CM

## 2022-12-22 DIAGNOSIS — I48.91 ATRIAL FIBRILLATION, UNSPECIFIED TYPE: ICD-10-CM

## 2022-12-22 DIAGNOSIS — I48.0 PAF (PAROXYSMAL ATRIAL FIBRILLATION): ICD-10-CM

## 2022-12-22 PROCEDURE — 93005 RHYTHM STRIP: ICD-10-PCS | Mod: S$GLB,,, | Performed by: INTERNAL MEDICINE

## 2022-12-22 PROCEDURE — 93010 RHYTHM STRIP: ICD-10-PCS | Mod: S$GLB,,, | Performed by: INTERNAL MEDICINE

## 2022-12-22 PROCEDURE — 99245 PR OFFICE CONSULTATION,LEVEL V: ICD-10-PCS | Mod: S$GLB,,, | Performed by: INTERNAL MEDICINE

## 2022-12-22 PROCEDURE — 99245 OFF/OP CONSLTJ NEW/EST HI 55: CPT | Mod: S$GLB,,, | Performed by: INTERNAL MEDICINE

## 2022-12-22 PROCEDURE — 93005 ELECTROCARDIOGRAM TRACING: CPT | Mod: S$GLB,,, | Performed by: INTERNAL MEDICINE

## 2022-12-22 PROCEDURE — 99999 PR PBB SHADOW E&M-EST. PATIENT-LVL III: ICD-10-PCS | Mod: PBBFAC,,, | Performed by: INTERNAL MEDICINE

## 2022-12-22 PROCEDURE — 99999 PR PBB SHADOW E&M-EST. PATIENT-LVL III: CPT | Mod: PBBFAC,,, | Performed by: INTERNAL MEDICINE

## 2022-12-22 PROCEDURE — 93010 ELECTROCARDIOGRAM REPORT: CPT | Mod: S$GLB,,, | Performed by: INTERNAL MEDICINE

## 2022-12-22 NOTE — Clinical Note
Early phase of paroxysmal AF. He was back in sinus rhythm within one day. Will hold off on antiarrhythmia therapy for now and reassess in 6m. No need for OAC given low CHADSVASC score thanks

## 2022-12-22 NOTE — PROGRESS NOTES
Subjective:    Patient ID:  Junior Beatty is a 62 y.o. male who presents for evaluation of Atrial Fibrillation      62 yoM here for AF management. He has AF 2019 while rehabing from Hip replacement. He had an adverse reaction to muscle relaxant. He had Covid Dec 2020 with AF/RVR. He converted on his own. He saw Dr Valencia and was in NSR. He had AF/RVR detected by his PCP. He has been on verapamil for HTN. CHADSVASC of 1.     Echo 12/23/2020:  · Techncially challenging study. limited view of the valves.  · The left ventricle is normal in size with normal systolic function. The estimated ejection fraction is 65%  · Indeterminate left ventricular diastolic function.  · Normal right ventricular size with normal right ventricular systolic function.  · Mild left atrial enlargement.  · Normal central venous pressure (3 mmHg).    Past Medical History:  No date: Arthritis  No date: Arthrogryposis  No date: Hyperlipidemia  No date: Hypertension  No date: PAF (paroxysmal atrial fibrillation)  No date: Personal history of colonic polyps    Past Surgical History:  1964: ANKLE SURGERY; Left      Comment:  Klever procedure  1962-63: APPLICATION OF SPICA CAST  2014: COLONOSCOPY  1965: ELBOW SURGERY; Bilateral  1966: FRACTURE SURGERY; Left  2009: HEMORRHOID SURGERY  5/27/2019: HIP ARTHROPLASTY; Right      Comment:  Procedure: ARTHROPLASTY, HIP;  Surgeon: Willian Covarrubias MD;  Location: [facility];  Service: Orthopedics;                 Laterality: Right;  [facility]  1993: JOINT REPLACEMENT; Left      Comment:  w/reconstruction 2002 bilateral hips  1986: KNEE ARTHROSCOPY; Right  No date: TONSILLECTOMY  10/23/2020: ULNAR NERVE TRANSPOSITION; Left      Comment:  Procedure: TRANSPOSITION, NERVE, ULNAR;  Surgeon: Claude S. Williams IV, MD;  Location: [facility];  Service:                Orthopedics;  Laterality: Left;  1965: wrist release; Bilateral    Social History    Socioeconomic History      Marital status:  Single    Tobacco Use      Smoking status: Never      Smokeless tobacco: Never    Substance and Sexual Activity      Alcohol use: Yes        Comment: limited      Drug use: Never    Social History Narrative      ** Merged History Encounter **         Review of patient's family history indicates:    Current Outpatient Medications:  ascorbic acid, vitamin C, (VITAMIN C) 1000 MG tablet, Take 1,000 mg by mouth once daily., Disp: , Rfl:   aspirin 81 MG Chew, Take 81 mg by mouth once daily., Disp: , Rfl:   b complex vitamins capsule, Take 1 capsule by mouth once daily., Disp: , Rfl:   betamethasone dipropionate (DIPROLENE) 0.05 % ointment, Apply topically 2 (two) times daily., Disp: 45 g, Rfl: 0  calcium-vitamin D tablet 600 mg-200 units, Take 1 tablet by mouth once., Disp: , Rfl:   FLAXSEED ORAL, Take by mouth., Disp: , Rfl:   garlic 1,000 mg Cap, Take by mouth., Disp: , Rfl:   meloxicam (MOBIC) 7.5 MG tablet, Take 7.5 mg by mouth once daily., Disp: , Rfl:   om 3/E/linol/ala/oleic/gla/lip (OMEGA 3-6-9 ORAL), Take 1,000 Units by mouth., Disp: , Rfl:   potassium 99 mg Tab, Take by mouth once., Disp: , Rfl:   pulse oximeter (PULSE OXIMETER) device, by Apply Externally route 2 (two) times a day. Use twice daily at 8 AM and 3 PM and [facility]., Disp: 1 each, Rfl: 0  trandolapriL (MAVIK) 2 MG Tab, Take 2 mg by mouth once daily., Disp: , Rfl:    vitamin D (VITAMIN D3) 1000 units Tab, Take 1,000 Units by mouth once daily. Takes 125 mcg, Disp: , Rfl:   atorvastatin (LIPITOR) 20 MG tablet, Take 1 tablet (20 mg total) by mouth once daily., Disp: 90 tablet, Rfl: 3  losartan (COZAAR) 100 MG tablet, Take 1 tablet (100 mg total) by mouth once daily., Disp: 90 tablet, Rfl: 3  verapamil (CALAN-SR) 180 MG CR tablet, Take 1 tablet (180 mg total) by mouth once daily., Disp: 30 tablet, Rfl: 11    Current Facility-Administered Medications:  aspirin tablet 325 mg, 325 mg, Oral, 1 time in Clinic/HOD, [place] THOMAS  MARCELINA Flores            Review of Systems   Constitutional: Negative.   HENT: Negative.     Eyes: Negative.    Cardiovascular:  Positive for palpitations. Negative for chest pain, dyspnea on exertion, leg swelling, near-syncope and syncope.   Respiratory: Negative.  Negative for shortness of breath.    Endocrine: Negative.    Hematologic/Lymphatic: Negative.    Skin: Negative.    Musculoskeletal: Negative.    Gastrointestinal: Negative.    Genitourinary: Negative.    Neurological: Negative.  Negative for dizziness and light-headedness.   Psychiatric/Behavioral: Negative.     Allergic/Immunologic: Negative.       Objective:    Physical Exam  Vitals reviewed.   Constitutional:       General: He is not in acute distress.     Appearance: He is well-developed.   HENT:      Head: Normocephalic and atraumatic.   Eyes:      Pupils: Pupils are equal, round, and reactive to light.   Neck:      Thyroid: No thyromegaly.      Vascular: No JVD.   Cardiovascular:      Rate and Rhythm: Normal rate and regular rhythm.      Chest Wall: PMI is not displaced.      Heart sounds: Normal heart sounds, S1 normal and S2 normal. No murmur heard.    No friction rub. No gallop.   Pulmonary:      Effort: Pulmonary effort is normal. No respiratory distress.      Breath sounds: Normal breath sounds. No wheezing or rales.   Abdominal:      General: Bowel sounds are normal. There is no distension.      Palpations: Abdomen is soft.      Tenderness: There is no abdominal tenderness. There is no guarding or rebound.   Musculoskeletal:         General: No tenderness. Normal range of motion.      Cervical back: Normal range of motion.   Skin:     General: Skin is warm and dry.      Findings: No erythema or rash.   Neurological:      Mental Status: He is alert and oriented to person, place, and time.      Cranial Nerves: No cranial nerve deficit.   Psychiatric:         Behavior: Behavior normal.         Thought Content: Thought content normal.          Judgment: Judgment normal.     ECG: NSR nl VA, QRS, QTc        Assessment:       1. Paroxysmal atrial fibrillation with rapid ventricular response    2. PAF (paroxysmal atrial fibrillation)         Plan:       62 yoM here for AF management. He has had 3 know lifetime AF events, 2 of which were provoked. CHADSVACV of 1, no need for OAC. Given lack of symptoms, I would not add AAD therapy. Will reassess in 6m    RTC 6m

## 2023-02-01 ENCOUNTER — PATIENT MESSAGE (OUTPATIENT)
Dept: ELECTROPHYSIOLOGY | Facility: CLINIC | Age: 63
End: 2023-02-01
Payer: COMMERCIAL

## 2023-02-02 ENCOUNTER — TELEPHONE (OUTPATIENT)
Dept: ELECTROPHYSIOLOGY | Facility: CLINIC | Age: 63
End: 2023-02-02
Payer: COMMERCIAL

## 2023-02-02 ENCOUNTER — PATIENT MESSAGE (OUTPATIENT)
Dept: ELECTROPHYSIOLOGY | Facility: CLINIC | Age: 63
End: 2023-02-02
Payer: COMMERCIAL

## 2023-02-02 ENCOUNTER — PATIENT MESSAGE (OUTPATIENT)
Dept: INTERNAL MEDICINE | Facility: CLINIC | Age: 63
End: 2023-02-02
Payer: COMMERCIAL

## 2023-02-02 NOTE — TELEPHONE ENCOUNTER
I called the pt concerning his insurance question.  He wanted Dr. Keenan to recommend a provider in his [employer] network.  I explained to him that unfortunately don't know what providers take which insurance.  It would be better if he would reach out to his insurance company and get me a list of physicians on his plan.  I could then let Dr. Keenan give him a recommendation.  Pt agreed and would send me a message through the portal when he finishes.

## 2023-02-09 ENCOUNTER — PATIENT MESSAGE (OUTPATIENT)
Dept: INTERNAL MEDICINE | Facility: CLINIC | Age: 63
End: 2023-02-09
Payer: COMMERCIAL

## 2023-02-23 ENCOUNTER — PATIENT MESSAGE (OUTPATIENT)
Dept: INTERNAL MEDICINE | Facility: CLINIC | Age: 63
End: 2023-02-23
Payer: COMMERCIAL

## 2023-03-10 ENCOUNTER — PATIENT MESSAGE (OUTPATIENT)
Dept: INTERNAL MEDICINE | Facility: CLINIC | Age: 63
End: 2023-03-10
Payer: COMMERCIAL

## 2023-03-14 ENCOUNTER — PATIENT MESSAGE (OUTPATIENT)
Dept: INTERNAL MEDICINE | Facility: CLINIC | Age: 63
End: 2023-03-14
Payer: COMMERCIAL

## 2023-04-07 ENCOUNTER — PATIENT MESSAGE (OUTPATIENT)
Dept: INTERNAL MEDICINE | Facility: CLINIC | Age: 63
End: 2023-04-07
Payer: COMMERCIAL

## 2023-04-10 ENCOUNTER — PATIENT MESSAGE (OUTPATIENT)
Dept: INTERNAL MEDICINE | Facility: CLINIC | Age: 63
End: 2023-04-10
Payer: COMMERCIAL

## 2023-04-17 ENCOUNTER — PATIENT MESSAGE (OUTPATIENT)
Dept: INTERNAL MEDICINE | Facility: CLINIC | Age: 63
End: 2023-04-17
Payer: COMMERCIAL

## 2023-04-18 NOTE — TELEPHONE ENCOUNTER
No new care gaps identified.  [facility] Embedded Care Gaps. Reference number: [id number]. 4/18/2023   7:34:50 AM CDT

## 2023-04-19 ENCOUNTER — ATHLETIC TRAINING SESSION (OUTPATIENT)
Dept: SPORTS MEDICINE | Facility: CLINIC | Age: 63
End: 2023-04-19
Payer: COMMERCIAL

## 2023-04-19 DIAGNOSIS — M79.602 ARM PAIN, ANTERIOR, LEFT: Primary | ICD-10-CM

## 2023-04-21 ENCOUNTER — PATIENT MESSAGE (OUTPATIENT)
Dept: ADMINISTRATIVE | Facility: HOSPITAL | Age: 63
End: 2023-04-21
Payer: COMMERCIAL

## 2023-05-08 RX ORDER — TRANDOLAPRIL 2 MG/1
TABLET ORAL
Refills: 0 | OUTPATIENT
Start: 2023-05-08

## 2023-05-08 RX ORDER — ATORVASTATIN CALCIUM 20 MG/1
TABLET, FILM COATED ORAL
Refills: 0 | OUTPATIENT
Start: 2023-05-08

## 2023-05-08 RX ORDER — MELOXICAM 7.5 MG/1
TABLET ORAL
Refills: 0 | OUTPATIENT
Start: 2023-05-08

## 2023-05-08 RX ORDER — VERAPAMIL HYDROCHLORIDE 180 MG/1
TABLET, FILM COATED, EXTENDED RELEASE ORAL
Refills: 0 | OUTPATIENT
Start: 2023-05-08

## 2023-05-08 RX ORDER — LOSARTAN POTASSIUM 100 MG/1
TABLET ORAL
Refills: 0 | OUTPATIENT
Start: 2023-05-08

## 2023-05-08 NOTE — TELEPHONE ENCOUNTER
No care due was identified.  [facility] Embedded Care Due Messages. Reference number: [id number].   5/08/2023 9:31:53 AM CDT

## 2023-05-08 NOTE — TELEPHONE ENCOUNTER
Refill Decision Note   Junior Beatty  is requesting a refill authorization.  Brief Assessment and Rationale for Refill:  Quick Discontinue     Medication Therapy Plan:    Pharmacy is requesting new scripts for the following medications without required information, (sig/ frequency/qty/etc)      Medication Reconciliation Completed: No     Comments: Pharmacies have been requesting medications for patients without required information, (sig, frequency, qty, etc.). In addition, requests are sent for medication(s) pt. are currently not taking, and medications patients have never taken.    We have spoken to the pharmacies about these request types and advised their teams previously that we are unable to assess these New Script requests and require all details for these requests. This is a known issue and has been reported.     Note composed:10:25 AM 05/08/2023

## 2023-05-09 ENCOUNTER — PATIENT MESSAGE (OUTPATIENT)
Dept: INTERNAL MEDICINE | Facility: CLINIC | Age: 63
End: 2023-05-09
Payer: COMMERCIAL

## 2023-05-09 NOTE — TELEPHONE ENCOUNTER
No care due was identified.  [facility] Embedded Care Due Messages. Reference number: [id number].   5/09/2023 1:21:23 PM CDT

## 2023-05-11 ENCOUNTER — ATHLETIC TRAINING SESSION (OUTPATIENT)
Dept: SPORTS MEDICINE | Facility: CLINIC | Age: 63
End: 2023-05-11
Payer: COMMERCIAL

## 2023-05-11 DIAGNOSIS — M25.511 ACUTE PAIN OF BOTH SHOULDERS: Primary | ICD-10-CM

## 2023-05-11 DIAGNOSIS — M25.512 ACUTE PAIN OF BOTH SHOULDERS: Primary | ICD-10-CM

## 2023-05-17 ENCOUNTER — PATIENT MESSAGE (OUTPATIENT)
Dept: INTERNAL MEDICINE | Facility: CLINIC | Age: 63
End: 2023-05-17
Payer: COMMERCIAL

## 2023-05-17 RX ORDER — ATORVASTATIN CALCIUM 20 MG/1
20 TABLET, FILM COATED ORAL DAILY
Qty: 90 TABLET | Refills: 1 | Status: SHIPPED | OUTPATIENT
Start: 2023-05-17 | End: 2023-10-26

## 2023-05-17 RX ORDER — VERAPAMIL HYDROCHLORIDE 180 MG/1
180 TABLET, FILM COATED, EXTENDED RELEASE ORAL DAILY
Qty: 30 TABLET | Refills: 11
Start: 2023-05-17 | End: 2024-05-16

## 2023-05-17 RX ORDER — TRANDOLAPRIL 2 MG/1
2 TABLET ORAL DAILY
Qty: 90 TABLET | Refills: 1 | Status: SHIPPED | OUTPATIENT
Start: 2023-05-17 | End: 2023-10-30

## 2023-05-17 RX ORDER — LOSARTAN POTASSIUM 100 MG/1
100 TABLET ORAL DAILY
Qty: 90 TABLET | Refills: 1 | Status: SHIPPED | OUTPATIENT
Start: 2023-05-17 | End: 2023-10-30

## 2023-05-17 RX ORDER — VERAPAMIL HYDROCHLORIDE 180 MG/1
180 TABLET, FILM COATED, EXTENDED RELEASE ORAL DAILY
Qty: 90 TABLET | Refills: 1 | Status: SHIPPED | OUTPATIENT
Start: 2023-05-17 | End: 2023-10-30

## 2023-05-17 RX ORDER — MELOXICAM 7.5 MG/1
7.5 TABLET ORAL DAILY PRN
Qty: 90 TABLET | Refills: 0 | Status: SHIPPED | OUTPATIENT
Start: 2023-05-17 | End: 2023-08-07

## 2023-05-17 RX ORDER — LOSARTAN POTASSIUM 100 MG/1
100 TABLET ORAL DAILY
Qty: 90 TABLET | Refills: 3
Start: 2023-05-17 | End: 2024-05-16

## 2023-05-25 ENCOUNTER — OFFICE VISIT (OUTPATIENT)
Dept: DERMATOLOGY | Facility: CLINIC | Age: 63
End: 2023-05-25
Payer: COMMERCIAL

## 2023-05-25 DIAGNOSIS — D48.5 NEOPLASM OF UNCERTAIN BEHAVIOR OF SKIN: Primary | ICD-10-CM

## 2023-05-25 PROCEDURE — 99999 PR PBB SHADOW E&M-EST. PATIENT-LVL III: CPT | Mod: PBBFAC,,, | Performed by: DERMATOLOGY

## 2023-05-25 PROCEDURE — 11102 TANGNTL BX SKIN SINGLE LES: CPT | Mod: S$GLB,,, | Performed by: DERMATOLOGY

## 2023-05-25 PROCEDURE — 88305 TISSUE EXAM BY PATHOLOGIST: ICD-10-PCS | Mod: 26,,, | Performed by: DERMATOLOGY

## 2023-05-25 PROCEDURE — 11102 PR TANGENTIAL BIOPSY, SKIN, SINGLE LESION: ICD-10-PCS | Mod: S$GLB,,, | Performed by: DERMATOLOGY

## 2023-05-25 PROCEDURE — 99999 PR PBB SHADOW E&M-EST. PATIENT-LVL III: ICD-10-PCS | Mod: PBBFAC,,, | Performed by: DERMATOLOGY

## 2023-05-25 PROCEDURE — 88305 TISSUE EXAM BY PATHOLOGIST: CPT | Mod: 26,,, | Performed by: DERMATOLOGY

## 2023-05-25 PROCEDURE — 88305 TISSUE EXAM BY PATHOLOGIST: CPT | Performed by: DERMATOLOGY

## 2023-05-25 PROCEDURE — 99499 UNLISTED E&M SERVICE: CPT | Mod: S$GLB,,, | Performed by: DERMATOLOGY

## 2023-05-25 PROCEDURE — 99499 NO LOS: ICD-10-PCS | Mod: S$GLB,,, | Performed by: DERMATOLOGY

## 2023-05-25 NOTE — PROGRESS NOTES
Subjective:      Patient ID:  Junior Beatty is a 62 y.o. male who presents for   Chief Complaint   Patient presents with    Lesion     Right arm x 2     Pt's c/o of lesion x 2 on right arm x 1 year - not healing, irritated by picking, bleeds, and not itchy. Tx: betamethasone oint - qday since 8/2022.     Review of Systems   Skin:  Negative for daily sunscreen use, activity-related sunscreen use and recent sunburn.   Hematologic/Lymphatic: Does not bruise/bleed easily.     Objective:   Physical Exam   Constitutional: He appears well-developed and well-nourished. No distress.   Neurological: He is alert and oriented to person, place, and time. He is not disoriented.   Psychiatric: He has a normal mood and affect.   Skin:   Areas Examined (abnormalities noted in diagram):   RUE Inspected          Diagram Legend     Erythematous scaling macule/papule c/w actinic keratosis       Vascular papule c/w angioma      Pigmented verrucoid papule/plaque c/w seborrheic keratosis      Yellow umbilicated papule c/w sebaceous hyperplasia      Irregularly shaped tan macule c/w lentigo     1-2 mm smooth white papules consistent with Milia      Movable subcutaneous cyst with punctum c/w epidermal inclusion cyst      Subcutaneous movable cyst c/w pilar cyst      Firm pink to brown papule c/w dermatofibroma      Pedunculated fleshy papule(s) c/w skin tag(s)      Evenly pigmented macule c/w junctional nevus     Mildly variegated pigmented, slightly irregular-bordered macule c/w mildly atypical nevus      Flesh colored to evenly pigmented papule c/w intradermal nevus       Pink pearly papule/plaque c/w basal cell carcinoma      Erythematous hyperkeratotic cursted plaque c/w SCC      Surgical scar with no sign of skin cancer recurrence      Open and closed comedones      Inflammatory papules and pustules      Verrucoid papule consistent consistent with wart     Erythematous eczematous patches and plaques     Dystrophic onycholytic nail  with subungual debris c/w onychomycosis     Umbilicated papule    Erythematous-base heme-crusted tan verrucoid plaque consistent with inflamed seborrheic keratosis     Erythematous Silvery Scaling Plaque c/w Psoriasis     See annotation      Assessment / Plan:      Pathology Orders:       Normal Orders This Visit    Specimen to Pathology, Dermatology     Questions:    Procedure Type: Dermatology and skin neoplasms    Number of Specimens: 1    ------------------------: -------------------------    Spec 1 Procedure: Biopsy    Spec 1 Clinical Impression: r/o LSC    Spec 1 Source: right upper arm    Release to patient:           Neoplasm of uncertain behavior of skin  Shave biopsy procedure note:    Shave biopsy performed after verbal consent including risk of infection, scar, recurrence, need for additional treatment of site. Area prepped with alcohol, anesthetized with approximately 1.0cc of 1% lidocaine with epinephrine. Lesional tissue shaved with razor blade. Hemostasis achieved with application of aluminum chloride followed by hyfrecation. No complications. Dressing applied. Wound care explained.    -     Specimen to Pathology, Dermatology             Follow up for prn bx report.Epic down today.  Difficult to accurately document. No photography available.

## 2023-05-25 NOTE — PROGRESS NOTES
Subjective:      Patient ID:  Junior Beatty is a 62 y.o. male who presents for   Chief Complaint   Patient presents with    Lesion     Right arm x 2     HPI    Review of Systems    Objective:   Physical Exam     Diagram Legend     Erythematous scaling macule/papule c/w actinic keratosis       Vascular papule c/w angioma      Pigmented verrucoid papule/plaque c/w seborrheic keratosis      Yellow umbilicated papule c/w sebaceous hyperplasia      Irregularly shaped tan macule c/w lentigo     1-2 mm smooth white papules consistent with Milia      Movable subcutaneous cyst with punctum c/w epidermal inclusion cyst      Subcutaneous movable cyst c/w pilar cyst      Firm pink to brown papule c/w dermatofibroma      Pedunculated fleshy papule(s) c/w skin tag(s)      Evenly pigmented macule c/w junctional nevus     Mildly variegated pigmented, slightly irregular-bordered macule c/w mildly atypical nevus      Flesh colored to evenly pigmented papule c/w intradermal nevus       Pink pearly papule/plaque c/w basal cell carcinoma      Erythematous hyperkeratotic cursted plaque c/w SCC      Surgical scar with no sign of skin cancer recurrence      Open and closed comedones      Inflammatory papules and pustules      Verrucoid papule consistent consistent with wart     Erythematous eczematous patches and plaques     Dystrophic onycholytic nail with subungual debris c/w onychomycosis     Umbilicated papule    Erythematous-base heme-crusted tan verrucoid plaque consistent with inflamed seborrheic keratosis     Erythematous Silvery Scaling Plaque c/w Psoriasis     See annotation      Assessment / Plan:      Pathology Orders:       Normal Orders This Visit    Specimen to Pathology, Dermatology     Comments:    Number of Specimens:->1  ------------------------->-------------------------  Spec 1 Procedure:->Biopsy  Spec 1 Clinical Impression:->r/o LSC  Spec 1 Source:->right upper arm    Questions:    Procedure Type: Dermatology and  skin neoplasms    Number of Specimens: 1    ------------------------: -------------------------    Spec 1 Procedure: Biopsy    Spec 1 Clinical Impression: r/o LSC    Spec 1 Source: right upper arm    Release to patient:           Neoplasm of uncertain behavior of skin  -     Specimen to Pathology, Dermatology             Follow up for prn bx report.

## 2023-05-25 NOTE — PATIENT INSTRUCTIONS
Shave Biopsy Wound Care    Your doctor has performed a shave biopsy today.  A band aid and vaseline ointment has been placed over the site.  This should remain in place for NO LONGER THAN 48 hours.  It is fine to remove the bandaid after 24 hours, if the area is no longer bleeding. It is recommended that you keep the area dry (do not wet)) for the first 24 hours.  After 24 hours, wash the area with warm soap and water and apply Vaseline jelly.  Many patients prefer to use Neosporin or Bacitracin ointment.  This is acceptable; however, know that you can develop an allergy to this medication even if you have used it safely for years.  It is important to keep the area moist.  Letting it dry out and get air slows healing time, and will worsen the scar.        If you notice increasing redness, tenderness, pain, or yellow drainage at the biopsy site, please notify your doctor.  These are signs of an infection.    If your biopsy site is bleeding, apply firm pressure for 15 minutes straight.  Repeat for another 15 minutes, if it is still bleeding.   If the surgical site continues to bleed, then please contact your doctor.      For [facility] users:   You will receive your biopsy results in [facility] as soon as they are available. Please be assured that your physician/provider will review your results and will then determine what further treatment, evaluation, or planning is required. You should be contacted by your physician's/provider's office within 5 business days of receiving your results; If not, please reach out to directly. This is one more way [facility] is putting you first.     [facility] [place] [address]/ [phone] FAX/ [email or web address]

## 2023-05-31 ENCOUNTER — PATIENT OUTREACH (OUTPATIENT)
Dept: ADMINISTRATIVE | Facility: HOSPITAL | Age: 63
End: 2023-05-31
Payer: COMMERCIAL

## 2023-05-31 LAB
FINAL PATHOLOGIC DIAGNOSIS: NORMAL
GROSS: NORMAL
Lab: NORMAL
MICROSCOPIC EXAM: NORMAL

## 2023-05-31 NOTE — PROGRESS NOTES
Health Maintenance Due   Topic Date Due    HIV Screening  Never done    TETANUS VACCINE  Never done    Colorectal Cancer Screening  Never done    Shingles Vaccine (1 of 2) Never done    COVID-19 Vaccine (3 - Booster for Corine series) 12/30/2021     Chart reviewed.   Immunizations: Reconciled  Orders placed: N/A  Upcoming appts to satisfy EARLE topics: N/A

## 2023-06-05 NOTE — PROGRESS NOTES
Mr Rogerio, your pathology report indicates a benign, non cancerous lesion usually caused from chronic rubbing or irritation. No further treatment is needed at this time. Thank you for allowing me to care for you let us know if you need further assitance. Enjoy your summer and take care, Dr. Radha Sellers    Skin, right upper arm, shave biopsy:   - PRURIGO NODULARIS / LICHEN SIMPLEX CHRONICUS

## 2023-06-13 NOTE — PROGRESS NOTES
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, knees, and elbows)  Osteoarthritis  COVID-19 infection December 2021  Bilateral elbow surgery, left ankle surgery as an infant  Right arthroscopic knee surgery  Bilateral total hip arthroplasty  Left ulnar nerve transposition     Social history  Tobacco use none, alcohol use limited  Exercise by running a recumbent bike    Family history   Mother hypertension  Father cardiovascular disease  Two brothers no health conditions     Screening colonoscopy age 53 colon polyps  Colonoscopy age 58     MEDICATIONS  Meloxicam 7.5 mg  Verapamil  mg daily  Aspirin 81 mg daily  Atorvastatin 20 mg daily  Mavik 2 mg daily  Losartan 100 mg daily  Vitamin-D 2000 units daily  Answers submitted by the patient for this visit:  Review of Systems Questionnaire (Submitted on 6/7/2023)  activity change: No  unexpected weight change: No  neck pain: No  hearing loss: No  rhinorrhea: No  trouble swallowing: No  eye discharge: No  visual disturbance: No  chest tightness: No  wheezing: No  chest pain: No  palpitations: No  blood in stool: No  constipation: No  vomiting: No  diarrhea: No  polydipsia: No  polyuria: No  difficulty urinating: No  urgency: No  hematuria: No  joint swelling: No  arthralgias: No  headaches: No  weakness: No  confusion: No  dysphoric mood: No      62-year-old male  Presents for routine annual visit    In general, he feels fine.  No acute issues.  Recently met with Dermatology where benign skin lesions were removed from his upper extremities.    He is scheduled to have a colonoscopy this year.  Due to history colon polyps    On occasion, not frequent he might feel his heart beating fast or racing but short-lived.  With this he is able go about his normal routine throughout the day    Review of symptoms  Negative for chest pain, shortness her breath, abdominal  pain.  Reports regular bowel function.  No difficulty urinating, nocturia x1.  No indigestion heartburn.  No chronic headaches.  Takes Mobic 7.5 mg daily to help with the arthralgias involving his hands and knees legs.    Examination   Weight 218 lb   Pulse 88   Blood pressure 138/84  HEENT exam no abnormal findings  Neck, no thyromegaly no masses  Chest clear breath sounds good effort  Heart regular rate rhythm, no murmurs or gallops  Abdominal exam soft, nontender, no hepatosplenomegaly abdominal masses  Pulses 2+ carotid pulses no bruits 2+ pedal pulses  Extremities no edema  Contraction deformity involving the elbows and ankles.    Impression   General exam   Hypertension  Hyperlipidemia  Pre diabetes  Paroxysmal atrial fibrillation  Arthrogryposis  History colon polyps  Class 2 obesity    Plan  Routine labs  Colonoscopy order  Continue with attention appropriate diet physical activity

## 2023-06-14 ENCOUNTER — OFFICE VISIT (OUTPATIENT)
Dept: INTERNAL MEDICINE | Facility: CLINIC | Age: 63
End: 2023-06-14
Payer: COMMERCIAL

## 2023-06-14 ENCOUNTER — LAB VISIT (OUTPATIENT)
Dept: LAB | Facility: HOSPITAL | Age: 63
End: 2023-06-14
Attending: INTERNAL MEDICINE
Payer: COMMERCIAL

## 2023-06-14 VITALS
WEIGHT: 218.69 LBS | HEART RATE: 88 BPM | BODY MASS INDEX: 35.15 KG/M2 | DIASTOLIC BLOOD PRESSURE: 84 MMHG | HEIGHT: 66 IN | SYSTOLIC BLOOD PRESSURE: 138 MMHG | OXYGEN SATURATION: 96 %

## 2023-06-14 DIAGNOSIS — I48.0 PAF (PAROXYSMAL ATRIAL FIBRILLATION): ICD-10-CM

## 2023-06-14 DIAGNOSIS — E78.5 HYPERLIPIDEMIA, UNSPECIFIED HYPERLIPIDEMIA TYPE: ICD-10-CM

## 2023-06-14 DIAGNOSIS — R73.03 PREDIABETES: ICD-10-CM

## 2023-06-14 DIAGNOSIS — I10 ESSENTIAL HYPERTENSION: ICD-10-CM

## 2023-06-14 DIAGNOSIS — E66.9 CLASS 2 OBESITY WITHOUT SERIOUS COMORBIDITY WITH BODY MASS INDEX (BMI) OF 35.0 TO 35.9 IN ADULT, UNSPECIFIED OBESITY TYPE: ICD-10-CM

## 2023-06-14 DIAGNOSIS — Z00.00 ANNUAL PHYSICAL EXAM: Primary | ICD-10-CM

## 2023-06-14 DIAGNOSIS — Z86.010 HISTORY OF COLON POLYPS: ICD-10-CM

## 2023-06-14 DIAGNOSIS — Z00.00 ANNUAL PHYSICAL EXAM: ICD-10-CM

## 2023-06-14 DIAGNOSIS — Z12.5 SCREENING FOR PROSTATE CANCER: ICD-10-CM

## 2023-06-14 DIAGNOSIS — Q74.3 ARTHROGRYPOSIS MULTIPLEX CONGENITA: ICD-10-CM

## 2023-06-14 LAB
ALBUMIN SERPL BCP-MCNC: 3.8 G/DL (ref 3.5–5.2)
ALP SERPL-CCNC: 105 U/L (ref 55–135)
ALT SERPL W/O P-5'-P-CCNC: 46 U/L (ref 10–44)
ANION GAP SERPL CALC-SCNC: 10 MMOL/L (ref 8–16)
AST SERPL-CCNC: 31 U/L (ref 10–40)
BASOPHILS # BLD AUTO: 0.03 K/UL (ref 0–0.2)
BASOPHILS NFR BLD: 0.5 % (ref 0–1.9)
BILIRUB SERPL-MCNC: 0.9 MG/DL (ref 0.1–1)
BUN SERPL-MCNC: 14 MG/DL (ref 6–20)
CALCIUM SERPL-MCNC: 9.3 MG/DL (ref 8.7–10.5)
CHLORIDE SERPL-SCNC: 106 MMOL/L (ref 95–110)
CHOLEST SERPL-MCNC: 139 MG/DL (ref 120–199)
CHOLEST/HDLC SERPL: 4.1 {RATIO} (ref 2–5)
CO2 SERPL-SCNC: 22 MMOL/L (ref 23–29)
COMPLEXED PSA SERPL-MCNC: 0.7 NG/ML (ref 0–4)
CREAT SERPL-MCNC: 0.8 MG/DL (ref 0.5–1.4)
DIFFERENTIAL METHOD: ABNORMAL
EOSINOPHIL # BLD AUTO: 0.2 K/UL (ref 0–0.5)
EOSINOPHIL NFR BLD: 2.8 % (ref 0–8)
ERYTHROCYTE [DISTWIDTH] IN BLOOD BY AUTOMATED COUNT: 13.5 % (ref 11.5–14.5)
EST. GFR  (NO RACE VARIABLE): >60 ML/MIN/1.73 M^2
ESTIMATED AVG GLUCOSE: 126 MG/DL (ref 68–131)
GLUCOSE SERPL-MCNC: 108 MG/DL (ref 70–110)
HBA1C MFR BLD: 6 % (ref 4–5.6)
HCT VFR BLD AUTO: 47.4 % (ref 40–54)
HDLC SERPL-MCNC: 34 MG/DL (ref 40–75)
HDLC SERPL: 24.5 % (ref 20–50)
HGB BLD-MCNC: 15.6 G/DL (ref 14–18)
IMM GRANULOCYTES # BLD AUTO: 0.04 K/UL (ref 0–0.04)
IMM GRANULOCYTES NFR BLD AUTO: 0.7 % (ref 0–0.5)
LDLC SERPL CALC-MCNC: 67.4 MG/DL (ref 63–159)
LYMPHOCYTES # BLD AUTO: 1.8 K/UL (ref 1–4.8)
LYMPHOCYTES NFR BLD: 31.6 % (ref 18–48)
MCH RBC QN AUTO: 28.6 PG (ref 27–31)
MCHC RBC AUTO-ENTMCNC: 32.9 G/DL (ref 32–36)
MCV RBC AUTO: 87 FL (ref 82–98)
MONOCYTES # BLD AUTO: 0.4 K/UL (ref 0.3–1)
MONOCYTES NFR BLD: 7.6 % (ref 4–15)
NEUTROPHILS # BLD AUTO: 3.2 K/UL (ref 1.8–7.7)
NEUTROPHILS NFR BLD: 56.8 % (ref 38–73)
NONHDLC SERPL-MCNC: 105 MG/DL
NRBC BLD-RTO: 0 /100 WBC
PLATELET # BLD AUTO: 221 K/UL (ref 150–450)
PMV BLD AUTO: 9.6 FL (ref 9.2–12.9)
POTASSIUM SERPL-SCNC: 3.7 MMOL/L (ref 3.5–5.1)
PROT SERPL-MCNC: 6.7 G/DL (ref 6–8.4)
RBC # BLD AUTO: 5.46 M/UL (ref 4.6–6.2)
SODIUM SERPL-SCNC: 138 MMOL/L (ref 136–145)
TRIGL SERPL-MCNC: 188 MG/DL (ref 30–150)
TSH SERPL DL<=0.005 MIU/L-ACNC: 1.61 UIU/ML (ref 0.4–4)
WBC # BLD AUTO: 5.67 K/UL (ref 3.9–12.7)

## 2023-06-14 PROCEDURE — 99396 PREV VISIT EST AGE 40-64: CPT | Mod: S$GLB,,, | Performed by: INTERNAL MEDICINE

## 2023-06-14 PROCEDURE — 84443 ASSAY THYROID STIM HORMONE: CPT | Performed by: INTERNAL MEDICINE

## 2023-06-14 PROCEDURE — 99999 PR PBB SHADOW E&M-EST. PATIENT-LVL V: CPT | Mod: PBBFAC,,, | Performed by: INTERNAL MEDICINE

## 2023-06-14 PROCEDURE — 84153 ASSAY OF PSA TOTAL: CPT | Performed by: INTERNAL MEDICINE

## 2023-06-14 PROCEDURE — 99999 PR PBB SHADOW E&M-EST. PATIENT-LVL V: ICD-10-PCS | Mod: PBBFAC,,, | Performed by: INTERNAL MEDICINE

## 2023-06-14 PROCEDURE — 36415 COLL VENOUS BLD VENIPUNCTURE: CPT | Performed by: INTERNAL MEDICINE

## 2023-06-14 PROCEDURE — 80061 LIPID PANEL: CPT | Performed by: INTERNAL MEDICINE

## 2023-06-14 PROCEDURE — 99396 PR PREVENTIVE VISIT,EST,40-64: ICD-10-PCS | Mod: S$GLB,,, | Performed by: INTERNAL MEDICINE

## 2023-06-14 PROCEDURE — 83036 HEMOGLOBIN GLYCOSYLATED A1C: CPT | Performed by: INTERNAL MEDICINE

## 2023-06-14 PROCEDURE — 80053 COMPREHEN METABOLIC PANEL: CPT | Performed by: INTERNAL MEDICINE

## 2023-06-14 PROCEDURE — 85025 COMPLETE CBC W/AUTO DIFF WBC: CPT | Performed by: INTERNAL MEDICINE

## 2023-06-16 ENCOUNTER — PATIENT MESSAGE (OUTPATIENT)
Dept: ENDOSCOPY | Facility: HOSPITAL | Age: 63
End: 2023-06-16

## 2023-06-16 ENCOUNTER — TELEPHONE (OUTPATIENT)
Dept: ENDOSCOPY | Facility: HOSPITAL | Age: 63
End: 2023-06-16

## 2023-06-16 ENCOUNTER — CLINICAL SUPPORT (OUTPATIENT)
Dept: ENDOSCOPY | Facility: HOSPITAL | Age: 63
End: 2023-06-16
Attending: INTERNAL MEDICINE
Payer: COMMERCIAL

## 2023-06-16 VITALS — HEIGHT: 66 IN | BODY MASS INDEX: 34.23 KG/M2 | WEIGHT: 213 LBS

## 2023-06-16 DIAGNOSIS — Z86.010 HISTORY OF COLON POLYPS: ICD-10-CM

## 2023-06-16 RX ORDER — SODIUM, POTASSIUM,MAG SULFATES 17.5-3.13G
1 SOLUTION, RECONSTITUTED, ORAL ORAL DAILY
Qty: 1 KIT | Refills: 0 | Status: SHIPPED | OUTPATIENT
Start: 2023-06-16 | End: 2023-06-18

## 2023-06-16 NOTE — TELEPHONE ENCOUNTER
Spoke to Pt to schedule procedure(s) Colonoscopy       Physician to perform procedure(s) Dr. ABILIO Bear  Date of Procedure (s) 7/21/23  Arrival Time 7:00 AM  Time of Procedure(s) 8:00 AM   Location of Procedure(s) [place] 2nd Floor  Type of Rx Prep sent to patient: Suprep  Instructions provided to patient via [facility]    Patient was informed on the following information and verbalized understanding. Screening questionnaire reviewed with patient and complete. If procedure requires anesthesia, a responsible adult needs to be present to accompany the patient home, patient cannot drive after receiving anesthesia. Appointment details are tentative, especially check-in time. Patient will receive a prep-op call 4 days prior to confirm check-in time for procedure. If applicable the patient should contact their pharmacy to verify Rx for procedure prep is ready for pick-up. Patient was advised to call the scheduling department at [phone] if pharmacy states no Rx is available. Patient was advised to call the endoscopy scheduling department if any questions or concerns arise.      [facility] Scheduling Department

## 2023-07-19 ENCOUNTER — ANESTHESIA EVENT (OUTPATIENT)
Dept: ENDOSCOPY | Facility: HOSPITAL | Age: 63
End: 2023-07-19
Payer: COMMERCIAL

## 2023-07-19 NOTE — ANESTHESIA PREPROCEDURE EVALUATION
07/19/2023  Junior Beatty is a 63 y.o., male.      Pre-op Assessment    I have reviewed the Patient Summary Reports.    I have reviewed the NPO Status.   I have reviewed the Medications.     Review of Systems  Anesthesia Hx:  No problems with previous Anesthesia  Denies Family Hx of Anesthesia complications.   Denies Personal Hx of Anesthesia complications.   Social:  Non-Smoker, No Alcohol Use    Hematology/Oncology:  Hematology Normal   Oncology Normal     EENT/Dental:EENT/Dental Normal   Cardiovascular:   Hypertension Dysrhythmias atrial fibrillation hyperlipidemia    Pulmonary:  Pulmonary Normal    Renal/:  Renal/ Normal     Hepatic/GI:  Hepatic/GI Normal    Musculoskeletal:   Arthritis  Right artificial hip    Arthrogryposis   Neurological:  Neurology Normal    Endocrine:  Obesity / BMI > 30  Dermatological:  Skin Normal    Psych:  Psychiatric Normal           Physical Exam    Airway:  Mallampati: II   Mouth Opening: Normal  Tongue: Normal  Neck ROM: Normal ROM    Dental:  Intact        Anesthesia Plan  Type of Anesthesia, risks & benefits discussed:    Anesthesia Type: Gen Natural Airway  Intra-op Monitoring Plan: Standard ASA Monitors  Induction:  IV  Informed Consent: Informed consent signed with the Patient and all parties understand the risks and agree with anesthesia plan.  All questions answered.   ASA Score: 3  Day of Surgery Review of History & Physical: H&P Update referred to the surgeon/provider.    Ready For Surgery From Anesthesia Perspective.     .       Patient Active Problem List   Diagnosis    Paroxysmal atrial fibrillation with rapid ventricular response    Impaired mobility and ADLs    Arthrogryposis multiplex congenita    Hyperlipidemia    Osteoarthritis    Presence of right artificial hip joint    Vitamin D deficiency    Essential hypertension    COVID-19    Troponin  level elevated    Class 2 severe obesity due to excess calories with serious comorbidity and body mass index (BMI) of 36.0 to 36.9 in adult    Agatston CAC score, <100       Past Medical History:   Diagnosis Date    Arthritis     Arthrogryposis     Hyperlipidemia     Hypertension     PAF (paroxysmal atrial fibrillation)     Personal history of colonic polyps        ECHO: Results for orders placed during the hospital encounter of 12/22/20    Echo Color Flow Doppler? Yes    Interpretation Summary  · Techncially challenging study. limited view of the valves.  · The left ventricle is normal in size with normal systolic function. The estimated ejection fraction is 65%  · Indeterminate left ventricular diastolic function.  · Normal right ventricular size with normal right ventricular systolic function.  · Mild left atrial enlargement.  · Normal central venous pressure (3 mmHg).      There is no height or weight on file to calculate BMI.    Tobacco Use: Low Risk     Smoking Tobacco Use: Never    Smokeless Tobacco Use: Never    Passive Exposure: Not on file       Social History     Substance and Sexual Activity   Drug Use Never        Alcohol Use: Not on file       Review of patient's allergies indicates:   Allergen Reactions    Ultram [tramadol] Other (See Comments)     disoriented    Robaxin [methocarbamol]      Pt believes this incited A.fib    Tramadol      Other reaction(s): Dizziness         Airway:  No value filed.   Past Surgical History:   Procedure Laterality Date    ANKLE SURGERY Left 1964    Klever procedure    APPLICATION OF SPICA CAST  1962-63    COLONOSCOPY  2014    ELBOW SURGERY Bilateral 1965    FRACTURE SURGERY Left 1966    HEMORRHOID SURGERY  2009    HIP ARTHROPLASTY Right 5/27/2019    Procedure: ARTHROPLASTY, HIP;  Surgeon: Willian Covarrubias MD;  [facility];  Service: Orthopedics;  Laterality: Right;  [facility]    JOINT REPLACEMENT Left 1993    w/reconstruction 2002 bilateral hips     KNEE ARTHROSCOPY Right 1986    TONSILLECTOMY      ULNAR NERVE TRANSPOSITION Left 10/23/2020    Procedure: TRANSPOSITION, NERVE, ULNAR;  Surgeon: Claude S. Williams IV, MD;  Location: [facility];  Service: Orthopedics;  Laterality: Left;    wrist release Bilateral 1965

## 2023-07-20 NOTE — H&P
Short Stay Endoscopy History and Physical    PCP - Tarik Sandoval MD  Referring Physician - Tarik Sandoval MD  [address]  [place] [address]    Procedure - Colonoscopy  ASA - per anesthesia  Mallampati - per anesthesia  History of Anesthesia problems - no  Family history Anesthesia problems -  no   Plan of anesthesia - General    HPI  63 y.o. male  Reason for procedure:   History of colon polyps [Z86.010]    Two cscopes in the past which removed 1-2 small polyps. Last 5 years ago.     ROS:  Constitutional: No fevers, chills, No weight loss  CV: No chest pain  Pulm: No cough, No shortness of breath  GI: see HPI    Medical History:  has a past medical history of Arthritis, Arthrogryposis, Hyperlipidemia, Hypertension, PAF (paroxysmal atrial fibrillation), and Personal history of colonic polyps.    Surgical History:  has a past surgical history that includes Application of spica cast (1962-63); Ankle surgery (Left, 1964); Elbow surgery (Bilateral, 1965); wrist release (Bilateral, 1965); Fracture surgery (Left, 1966); Tonsillectomy; Knee arthroscopy (Right, 1986); Hemorrhoid surgery (2009); Colonoscopy (2014); Hip Arthroplasty (Right, 5/27/2019); Joint replacement (Left, 1993); and Ulnar nerve transposition (Left, 10/23/2020).    Family History: family history includes Basal cell carcinoma in his brother and mother; Diabetes in his father; Heart disease in his father; Hypertension in his mother..    Social History:  reports that he has never smoked. He has never used smokeless tobacco. He reports current alcohol use. He reports that he does not use drugs.    Review of patient's allergies indicates:   Allergen Reactions    Ultram [tramadol] Other (See Comments)     disoriented    Robaxin [methocarbamol]      Pt believes this incited A.fib    Tramadol      Other reaction(s): Dizziness       Medications:   Facility-Administered Medications Prior to Admission   Medication Dose Route  Frequency Provider Last Rate Last Admin    aspirin tablet 325 mg  325 mg Oral 1 time in Clinic[facility]         Medications Prior to Admission   Medication Sig Dispense Refill Last Dose    ascorbic acid, vitamin C, (VITAMIN C) 1000 MG tablet Take 1,000 mg by mouth once daily.       aspirin 81 MG Chew Take 81 mg by mouth once daily.       atorvastatin (LIPITOR) 20 MG tablet Take 1 tablet (20 mg total) by mouth once daily. 90 tablet 1     b complex vitamins capsule Take 1 capsule by mouth once daily.       betamethasone dipropionate (DIPROLENE) 0.05 % ointment Apply topically 2 (two) times daily. 45 g 0     calcium-vitamin D tablet 600 mg-200 units Take 1 tablet by mouth once.       FLAXSEED ORAL Take by mouth.       garlic 1,000 mg Cap Take by mouth.       losartan (COZAAR) 100 MG tablet Take 1 tablet (100 mg total) by mouth once daily. 90 tablet 1     meloxicam (MOBIC) 7.5 MG tablet Take 1 tablet (7.5 mg total) by mouth daily as needed for Pain. 90 tablet 0     om 3/E/linol/ala/oleic/gla/lip (OMEGA 3-6-9 ORAL) Take 1,000 Units by mouth.       potassium 99 mg Tab Take by mouth once.       pulse oximeter (PULSE OXIMETER) device by Apply Externally route 2 (two) times a day. Use twice daily at 8 AM and 3 PM and record the value in [facility] as directed. 1 each 0     trandolapriL (MAVIK) 2 MG Tab Take 1 tablet (2 mg total) by mouth once daily. 90 tablet 1     verapamiL (CALAN-SR) 180 MG CR tablet Take 1 tablet (180 mg total) by mouth once daily. 90 tablet 1     vitamin D (VITAMIN D3) 1000 units Tab Take 1,000 Units by mouth once daily. Takes 125 mcg          Physical Exam:    Vital Signs: There were no vitals filed for this visit.    General Appearance: Well appearing in no acute distress  Abdomen: Soft, non tender, non distended with normal bowel sounds, no masses    Labs:  Lab Results   Component Value Date    WBC 5.67 06/14/2023    HGB 15.6 06/14/2023    HCT 47.4 06/14/2023     06/14/2023    CHOL  139 06/14/2023    TRIG 188 (H) 06/14/2023    HDL 34 (L) 06/14/2023    ALT 46 (H) 06/14/2023    AST 31 06/14/2023     06/14/2023    K 3.7 06/14/2023     06/14/2023    CREATININE 0.8 06/14/2023    BUN 14 06/14/2023    CO2 22 (L) 06/14/2023    TSH 1.608 06/14/2023    PSA 0.70 06/14/2023    INR 0.9 12/22/2020    HGBA1C 6.0 (H) 06/14/2023       I have explained the risks and benefits of this endoscopic procedure to the patient including but not limited to bleeding, inflammation, infection, perforation, and death.    Assessment/Plan:     CRC Surveillance     - Proceed with colonoscopy     Reyna Bear MD  Gastroenterology   [facility]

## 2023-07-21 ENCOUNTER — ANESTHESIA (OUTPATIENT)
Dept: ENDOSCOPY | Facility: HOSPITAL | Age: 63
End: 2023-07-21
Payer: COMMERCIAL

## 2023-07-21 ENCOUNTER — HOSPITAL ENCOUNTER (OUTPATIENT)
Facility: HOSPITAL | Age: 63
Discharge: HOME | End: 2023-07-21
Attending: STUDENT IN AN ORGANIZED HEALTH CARE EDUCATION/TRAINING PROGRAM | Admitting: STUDENT IN AN ORGANIZED HEALTH CARE EDUCATION/TRAINING PROGRAM
Payer: COMMERCIAL

## 2023-07-21 VITALS
WEIGHT: 213 LBS | BODY MASS INDEX: 34.23 KG/M2 | SYSTOLIC BLOOD PRESSURE: 137 MMHG | HEART RATE: 60 BPM | HEIGHT: 66 IN | DIASTOLIC BLOOD PRESSURE: 99 MMHG | OXYGEN SATURATION: 98 % | RESPIRATION RATE: 16 BRPM | TEMPERATURE: 98 F

## 2023-07-21 DIAGNOSIS — Z12.11 COLON CANCER SCREENING: Primary | ICD-10-CM

## 2023-07-21 PROCEDURE — 45385 COLONOSCOPY W/LESION REMOVAL: CPT | Mod: PT | Performed by: STUDENT IN AN ORGANIZED HEALTH CARE EDUCATION/TRAINING PROGRAM

## 2023-07-21 PROCEDURE — 99900035 HC TECH TIME PER 15 MIN (STAT)

## 2023-07-21 PROCEDURE — 25000003 PHARM REV CODE 250: Performed by: NURSE ANESTHETIST, CERTIFIED REGISTERED

## 2023-07-21 PROCEDURE — 27201089 HC SNARE, DISP (ANY): Performed by: STUDENT IN AN ORGANIZED HEALTH CARE EDUCATION/TRAINING PROGRAM

## 2023-07-21 PROCEDURE — D9220A PRA ANESTHESIA: ICD-10-PCS | Mod: 33,,, | Performed by: NURSE ANESTHETIST, CERTIFIED REGISTERED

## 2023-07-21 PROCEDURE — 63600175 PHARM REV CODE 636 W HCPCS: Performed by: NURSE ANESTHETIST, CERTIFIED REGISTERED

## 2023-07-21 PROCEDURE — 45385 PR COLONOSCOPY,REMV LESN,SNARE: ICD-10-PCS | Mod: 33,,, | Performed by: STUDENT IN AN ORGANIZED HEALTH CARE EDUCATION/TRAINING PROGRAM

## 2023-07-21 PROCEDURE — 88305 TISSUE EXAM BY PATHOLOGIST: ICD-10-PCS | Mod: 26,,, | Performed by: PATHOLOGY

## 2023-07-21 PROCEDURE — 94761 N-INVAS EAR/PLS OXIMETRY MLT: CPT

## 2023-07-21 PROCEDURE — D9220A PRA ANESTHESIA: Mod: 33,,, | Performed by: NURSE ANESTHETIST, CERTIFIED REGISTERED

## 2023-07-21 PROCEDURE — 37000008 HC ANESTHESIA 1ST 15 MINUTES: Performed by: STUDENT IN AN ORGANIZED HEALTH CARE EDUCATION/TRAINING PROGRAM

## 2023-07-21 PROCEDURE — 88305 TISSUE EXAM BY PATHOLOGIST: CPT | Mod: 26,,, | Performed by: PATHOLOGY

## 2023-07-21 PROCEDURE — 88305 TISSUE EXAM BY PATHOLOGIST: CPT | Performed by: PATHOLOGY

## 2023-07-21 PROCEDURE — 45385 COLONOSCOPY W/LESION REMOVAL: CPT | Mod: 33,,, | Performed by: STUDENT IN AN ORGANIZED HEALTH CARE EDUCATION/TRAINING PROGRAM

## 2023-07-21 PROCEDURE — 37000009 HC ANESTHESIA EA ADD 15 MINS: Performed by: STUDENT IN AN ORGANIZED HEALTH CARE EDUCATION/TRAINING PROGRAM

## 2023-07-21 RX ORDER — PROPOFOL 10 MG/ML
VIAL (ML) INTRAVENOUS
Status: DISCONTINUED | OUTPATIENT
Start: 2023-07-21 | End: 2023-07-21

## 2023-07-21 RX ORDER — SODIUM CHLORIDE 9 MG/ML
INJECTION, SOLUTION INTRAVENOUS CONTINUOUS
Status: DISCONTINUED | OUTPATIENT
Start: 2023-07-21 | End: 2023-07-21 | Stop reason: HOSPADM

## 2023-07-21 RX ORDER — LIDOCAINE HYDROCHLORIDE 20 MG/ML
INJECTION INTRAVENOUS
Status: DISCONTINUED | OUTPATIENT
Start: 2023-07-21 | End: 2023-07-21

## 2023-07-21 RX ORDER — PROPOFOL 10 MG/ML
VIAL (ML) INTRAVENOUS CONTINUOUS PRN
Status: DISCONTINUED | OUTPATIENT
Start: 2023-07-21 | End: 2023-07-21

## 2023-07-21 RX ADMIN — Medication 150 MCG/KG/MIN: at 08:07

## 2023-07-21 RX ADMIN — LIDOCAINE HYDROCHLORIDE 80 MG: 20 INJECTION INTRAVENOUS at 08:07

## 2023-07-21 RX ADMIN — SODIUM CHLORIDE: 0.9 INJECTION, SOLUTION INTRAVENOUS at 08:07

## 2023-07-21 RX ADMIN — PROPOFOL 70 MG: 10 INJECTION, EMULSION INTRAVENOUS at 08:07

## 2023-07-21 NOTE — ANESTHESIA POSTPROCEDURE EVALUATION
Anesthesia Post Evaluation    Patient: Junior Beatty    Procedure(s) Performed: Procedure(s) (LRB):  COLONOSCOPY (N/A)    Final Anesthesia Type: general      Patient location during evaluation: PACU  Patient participation: Yes- Able to Participate  Level of consciousness: awake  Post-procedure vital signs: reviewed and stable  Pain management: adequate  Airway patency: patent    PONV status at discharge: No PONV  Anesthetic complications: no      Cardiovascular status: blood pressure returned to baseline  Respiratory status: unassisted  Hydration status: euvolemic  Follow-up not needed.          Vitals Value Taken Time   /99 07/21/23 0845   Temp 36.5 °C (97.7 °F) 07/21/23 0825   Pulse 60 07/21/23 0845   Resp 16 07/21/23 0845   SpO2 98 % 07/21/23 0845         Event Time   Out of Recovery 08:39:35         Pain/Jimmy Score: Jimmy Score: 10 (7/21/2023  8:45 AM)

## 2023-07-21 NOTE — PLAN OF CARE
Discharge instructions given and explained to patient  with verbalization of understanding all instructions. Patient is AAOx3,v/s stable, denies n/v and tolerating po, rates pain level tolerable, IV removed. Patient transported off unit via wheelchair, discharged home with family.

## 2023-07-21 NOTE — PROVATION PATIENT INSTRUCTIONS
Discharge Summary/Instructions after an Endoscopic Procedure  Patient Name: Junior Beatty  Patient MRN: [id number]  Patient YOB: 1960 Friday, July 21, 2023  Reyna Bear MD  Dear patient,  As a result of recent federal legislation (The Federal Cures Act), you may   receive lab or pathology results from your procedure in your [facility]   account before your physician is able to contact you. Your physician or   their representative will relay the results to you with their   recommendations at their soonest availability.  Thank you,  RESTRICTIONS:  During your procedure today, you received medications for sedation.  These   medications may affect your judgment, balance and coordination.  Therefore,   for 24 hours, you have the following restrictions:   - DO NOT drive a car, operate machinery, make legal/financial decisions,   sign important papers or drink alcohol.    ACTIVITY:  Today: no heavy lifting, straining or running due to procedural   sedation/anesthesia.  The following day: return to full activity including work.  DIET:  Eat and drink normally unless instructed otherwise.     TREATMENT FOR COMMON SIDE EFFECTS:  - Mild abdominal pain, nausea, belching, bloating or excessive gas:  rest,   eat lightly and use a heating pad.  - Sore Throat: treat with throat lozenges and/or gargle with warm salt   water.  - Because air was used during the procedure, expelling large amounts of air   from your rectum or belching is normal.  - If a bowel prep was taken, you may not have a bowel movement for 1-3 days.    This is normal.  SYMPTOMS TO WATCH FOR AND REPORT TO YOUR PHYSICIAN:  1. Abdominal pain or bloating, other than gas cramps.  2. Chest pain.  3. Back pain.  4. Signs of infection such as: chills or fever occurring within 24 hours   after the procedure.  5. Rectal bleeding, which would show as bright red, maroon, or black stools.   (A tablespoon of blood from the rectum is not serious, especially if    hemorrhoids are present.)  6. Vomiting.  7. Weakness or dizziness.  GO DIRECTLY TO THE NEAREST EMERGENCY ROOM IF YOU HAVE ANY OF THE FOLLOWING:      Difficulty breathing              Chills and/or fever over 101 F   Persistent vomiting and/or vomiting blood   Severe abdominal pain   Severe chest pain   Black, tarry stools   Bleeding- more than one tablespoon   Any other symptom or condition that you feel may need urgent attention  Your doctor recommends these additional instructions:  If any biopsies were taken, your doctors clinic will contact you in 1 to 2   weeks with any results.  - Discharge patient to home (ambulatory).   - Resume regular diet.   - Continue present medications.   - Await pathology results.   - Repeat colonoscopy in 7 years for surveillance.  For questions, problems or results please call your physician - Reyna Bear MD at Work:  [phone].  [facility], EMERGENCY ROOM PHONE NUMBER: [phone]  IF A COMPLICATION OR EMERGENCY SITUATION ARISES AND YOU ARE UNABLE TO REACH   YOUR PHYSICIAN - GO DIRECTLY TO THE EMERGENCY ROOM.  Reyna Bear MD  7/21/2023 8:27:03 AM  This report has been verified and signed electronically.  Dear patient,  As a result of recent federal legislation (The Federal Cures Act), you may   receive lab or pathology results from your procedure in your [facility]   account before your physician is able to contact you. Your physician or   their representative will relay the results to you with their   recommendations at their soonest availability.  Thank you,  PROVATION

## 2023-07-21 NOTE — TRANSFER OF CARE
"Anesthesia Transfer of Care Note    Patient: Junior Beatty    Procedure(s) Performed: Procedure(s) (LRB):  COLONOSCOPY (N/A)    Patient location: PACU    Anesthesia Type: MAC    Transport from OR: Transported from OR on room air with adequate spontaneous ventilation    Post pain: adequate analgesia    Post assessment: no apparent anesthetic complications    Post vital signs: stable    Level of consciousness: awake, alert and oriented    Nausea/Vomiting: no nausea/vomiting    Complications: none    Transfer of care protocol was followed      Last vitals:   Visit Vitals  BP (!) 155/78 (BP Location: Right arm, Patient Position: Sitting)   Pulse 63   Temp 36.5 °C (97.7 °F) (Temporal)   Resp 16   Ht 5' 5.5" (1.664 m)   Wt 96.6 kg (213 lb)   SpO2 97%   BMI 34.91 kg/m²     "

## 2023-07-26 LAB
FINAL PATHOLOGIC DIAGNOSIS: NORMAL
GROSS: NORMAL
Lab: NORMAL

## 2023-10-26 RX ORDER — ATORVASTATIN CALCIUM 20 MG/1
20 TABLET, FILM COATED ORAL
Qty: 90 TABLET | Refills: 2 | Status: SHIPPED | OUTPATIENT
Start: 2023-10-26

## 2023-10-26 NOTE — TELEPHONE ENCOUNTER
No care due was identified.  [facility] Embedded Care Due Messages. Reference number: [id number].   10/26/2023 12:27:49 AM CDT  
Refill Routing Note   Medication(s) are not appropriate for processing by [facility] for the following reason(s):      Required vitals abnormal    [facility] action(s):  Defer  Approve Care Due:  None identified            Appointments  past 12m or future 3m with PCP    Date Provider   Last Visit   6/14/2023 Tarik Sandoval MD   Next Visit   Visit date not found Tarik Sandoval MD   ED visits in past 90 days: 0        Note composed:9:26 AM 10/26/2023          
Detail Level: Zone

## 2023-10-30 RX ORDER — TRANDOLAPRIL 2 MG/1
2 TABLET ORAL DAILY
Qty: 90 TABLET | Refills: 1 | Status: CANCELLED | OUTPATIENT
Start: 2023-10-30

## 2023-10-30 RX ORDER — LOSARTAN POTASSIUM 100 MG/1
100 TABLET ORAL
Qty: 90 TABLET | Refills: 2 | Status: SHIPPED | OUTPATIENT
Start: 2023-10-30

## 2023-10-30 RX ORDER — LOSARTAN POTASSIUM 100 MG/1
100 TABLET ORAL DAILY
Qty: 90 TABLET | Refills: 1 | Status: CANCELLED | OUTPATIENT
Start: 2023-10-30

## 2023-10-30 RX ORDER — TRANDOLAPRIL 2 MG/1
2 TABLET ORAL
Qty: 90 TABLET | Refills: 2 | Status: SHIPPED | OUTPATIENT
Start: 2023-10-30

## 2023-10-30 RX ORDER — VERAPAMIL HYDROCHLORIDE 180 MG/1
180 TABLET, FILM COATED, EXTENDED RELEASE ORAL DAILY
Qty: 90 TABLET | Refills: 1 | Status: CANCELLED | OUTPATIENT
Start: 2023-10-30

## 2023-10-30 RX ORDER — VERAPAMIL HYDROCHLORIDE 180 MG/1
180 TABLET, FILM COATED, EXTENDED RELEASE ORAL
Qty: 90 TABLET | Refills: 2 | Status: SHIPPED | OUTPATIENT
Start: 2023-10-30

## 2023-10-30 NOTE — TELEPHONE ENCOUNTER
Pended requested rx.     Called patient to let him know that we received his message about his refills. No answer, left voicemail.

## 2023-10-30 NOTE — TELEPHONE ENCOUNTER
----- Message from Josey John sent at 10/30/2023 11:17 AM CDT -----  Contact: EDWARD [phone]  Requesting an RX refill or new RX.    Is this a refill or new RX:--Refill--     RX name and strength (copy/paste from chart):    1.trandolapriL (MAVIK) 2 MG Tab  2.verapamiL (CALAN-SR) 180 MG CR tablet  3.losartan (COZAAR) 100 MG tablet    Is this a 30 day or 90 day RX: --90-days--    Pharmacy name and phone # (copy/paste from chart):    [facility] - [address] [address]  Phone: [phone] Fax: [phone]      Comment:PT states that the pharmacy has sent multiple request for the medication listed above, but still has not received it. He would like a call back today to be advise that the refill request was received, because the pharmacy has no refill on file.. Please call to advise.

## 2023-10-30 NOTE — TELEPHONE ENCOUNTER
No care due was identified.  [facility] Embedded Care Due Messages. Reference number: [id number].   10/30/2023 4:29:35 PM CDT

## 2023-12-04 ENCOUNTER — PATIENT OUTREACH (OUTPATIENT)
Dept: ADMINISTRATIVE | Facility: HOSPITAL | Age: 63
End: 2023-12-04
Payer: COMMERCIAL

## 2023-12-04 NOTE — PROGRESS NOTES
Health Maintenance Due   Topic Date Due    HIV Screening  Never done    TETANUS VACCINE  Never done    Shingles Vaccine (1 of 2) Never done    RSV Vaccine (Age 60+ and Pregnant patients) (1 - 1-dose 60+ series) Never done    Influenza Vaccine (1) Never done    COVID-19 Vaccine (3 - 2023-24 season) 09/01/2023     HM updated. Triggered LINKS . Updated care everywhere.     Barbie Martinez LPN   Clinical Care Coordinator  Primary Care and Wellness

## 2023-12-18 ENCOUNTER — LAB VISIT (OUTPATIENT)
Dept: LAB | Facility: HOSPITAL | Age: 63
End: 2023-12-18
Attending: INTERNAL MEDICINE
Payer: COMMERCIAL

## 2023-12-18 ENCOUNTER — OFFICE VISIT (OUTPATIENT)
Dept: INTERNAL MEDICINE | Facility: CLINIC | Age: 63
End: 2023-12-18
Payer: COMMERCIAL

## 2023-12-18 VITALS
WEIGHT: 224.19 LBS | HEIGHT: 65 IN | SYSTOLIC BLOOD PRESSURE: 152 MMHG | BODY MASS INDEX: 37.35 KG/M2 | DIASTOLIC BLOOD PRESSURE: 88 MMHG

## 2023-12-18 DIAGNOSIS — I10 ESSENTIAL HYPERTENSION: Primary | ICD-10-CM

## 2023-12-18 DIAGNOSIS — I48.0 PAF (PAROXYSMAL ATRIAL FIBRILLATION): ICD-10-CM

## 2023-12-18 DIAGNOSIS — E66.9 CLASS 2 OBESITY WITHOUT SERIOUS COMORBIDITY WITH BODY MASS INDEX (BMI) OF 35.0 TO 35.9 IN ADULT, UNSPECIFIED OBESITY TYPE: ICD-10-CM

## 2023-12-18 DIAGNOSIS — I25.84 CORONARY ARTERY CALCIFICATION: ICD-10-CM

## 2023-12-18 DIAGNOSIS — E78.5 HYPERLIPIDEMIA, UNSPECIFIED HYPERLIPIDEMIA TYPE: ICD-10-CM

## 2023-12-18 DIAGNOSIS — R07.89 MID STERNAL CHEST PAIN: ICD-10-CM

## 2023-12-18 DIAGNOSIS — R35.1 NOCTURIA: ICD-10-CM

## 2023-12-18 DIAGNOSIS — I10 ESSENTIAL HYPERTENSION: ICD-10-CM

## 2023-12-18 DIAGNOSIS — I25.10 CORONARY ARTERY CALCIFICATION: ICD-10-CM

## 2023-12-18 DIAGNOSIS — R73.03 PREDIABETES: ICD-10-CM

## 2023-12-18 LAB
ALBUMIN SERPL BCP-MCNC: 3.8 G/DL (ref 3.5–5.2)
ALP SERPL-CCNC: 100 U/L (ref 55–135)
ALT SERPL W/O P-5'-P-CCNC: 51 U/L (ref 10–44)
ANION GAP SERPL CALC-SCNC: 9 MMOL/L (ref 8–16)
AST SERPL-CCNC: 31 U/L (ref 10–40)
BILIRUB SERPL-MCNC: 0.3 MG/DL (ref 0.1–1)
BUN SERPL-MCNC: 16 MG/DL (ref 8–23)
CALCIUM SERPL-MCNC: 9.5 MG/DL (ref 8.7–10.5)
CHLORIDE SERPL-SCNC: 109 MMOL/L (ref 95–110)
CHOLEST SERPL-MCNC: 146 MG/DL (ref 120–199)
CHOLEST/HDLC SERPL: 5 {RATIO} (ref 2–5)
CO2 SERPL-SCNC: 22 MMOL/L (ref 23–29)
CREAT SERPL-MCNC: 0.9 MG/DL (ref 0.5–1.4)
EST. GFR  (NO RACE VARIABLE): >60 ML/MIN/1.73 M^2
ESTIMATED AVG GLUCOSE: 140 MG/DL (ref 68–131)
GLUCOSE SERPL-MCNC: 133 MG/DL (ref 70–110)
HBA1C MFR BLD: 6.5 % (ref 4–5.6)
HDLC SERPL-MCNC: 29 MG/DL (ref 40–75)
HDLC SERPL: 19.9 % (ref 20–50)
LDLC SERPL CALC-MCNC: ABNORMAL MG/DL (ref 63–159)
NONHDLC SERPL-MCNC: 117 MG/DL
POTASSIUM SERPL-SCNC: 3.9 MMOL/L (ref 3.5–5.1)
PROT SERPL-MCNC: 6.8 G/DL (ref 6–8.4)
SODIUM SERPL-SCNC: 140 MMOL/L (ref 136–145)
TRIGL SERPL-MCNC: 420 MG/DL (ref 30–150)

## 2023-12-18 PROCEDURE — 99999 PR PBB SHADOW E&M-EST. PATIENT-LVL IV: ICD-10-PCS | Mod: PBBFAC,,, | Performed by: INTERNAL MEDICINE

## 2023-12-18 PROCEDURE — 83036 HEMOGLOBIN GLYCOSYLATED A1C: CPT | Performed by: INTERNAL MEDICINE

## 2023-12-18 PROCEDURE — 80053 COMPREHEN METABOLIC PANEL: CPT | Performed by: INTERNAL MEDICINE

## 2023-12-18 PROCEDURE — 99214 PR OFFICE/OUTPT VISIT, EST, LEVL IV, 30-39 MIN: ICD-10-PCS | Mod: S$GLB,,, | Performed by: INTERNAL MEDICINE

## 2023-12-18 PROCEDURE — 36415 COLL VENOUS BLD VENIPUNCTURE: CPT | Performed by: INTERNAL MEDICINE

## 2023-12-18 PROCEDURE — 80061 LIPID PANEL: CPT | Performed by: INTERNAL MEDICINE

## 2023-12-18 PROCEDURE — 99214 OFFICE O/P EST MOD 30 MIN: CPT | Mod: S$GLB,,, | Performed by: INTERNAL MEDICINE

## 2023-12-18 PROCEDURE — 99999 PR PBB SHADOW E&M-EST. PATIENT-LVL IV: CPT | Mod: PBBFAC,,, | Performed by: INTERNAL MEDICINE

## 2023-12-18 RX ORDER — HYDROCHLOROTHIAZIDE 12.5 MG/1
12.5 TABLET ORAL DAILY
Qty: 30 TABLET | Refills: 0 | Status: SHIPPED | OUTPATIENT
Start: 2023-12-18 | End: 2024-01-09

## 2023-12-18 NOTE — PROGRESS NOTES
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, knees, and elbows)  Osteoarthritis  COVID-19 infection December 2021  Bilateral elbow surgery, left ankle surgery as an infant  Right arthroscopic knee surgery  Bilateral total hip arthroplasty  Left ulnar nerve transposition     Social history  Tobacco use none, alcohol use limited  Exercise by running a recumbent bike        MEDICATIONS  Meloxicam 7.5 mg  Verapamil  mg daily  Aspirin 81 mg daily  Atorvastatin 20 mg daily  Mavik 2 mg daily  Losartan 100 mg daily  Vitamin-D 2000 units daily    Answers submitted by the patient for this visit:  Review of Systems Questionnaire (Submitted on 12/18/2023)  activity change: No  unexpected weight change: No  neck pain: No  hearing loss: No  rhinorrhea: No  trouble swallowing: No  eye discharge: No  visual disturbance: No  chest tightness: No  wheezing: No  chest pain: No  palpitations: Yes  blood in stool: No  constipation: No  vomiting: No  diarrhea: No  polydipsia: No  polyuria: Yes  difficulty urinating: No  urgency: Yes  hematuria: No  joint swelling: No  arthralgias: No  headaches: No  weakness: No  confusion: No  dysphoric mood: No      63-year-old male    Comes in for follow-up check.  He has taking note that his blood pressure readings have been consistently elevated in the 150 systolic and 80s diastolic.    Also once every 6 weeks he will get about 4 times a night to urinate in the next day might lose about 4 lb and feel little bit weak.  Normally is urinations fine may have nocturia x1    Also on occasion he will develop a tight feeling in his chest.  Usually comes on after drinking a lot a water.  But it comes on randomly.  Can not really tell me how long it lasts for glass for awhile.  It does not necessarily come on with physical activity.  Does not come on during the meal.  There is no shortness a  breath.    Patient reports no abdominal pain.  Regular bowel function.  No heartburn indigestion.  Breathing overall is fine    Examination   Weight 224   BMI 37.31   Pulse 76   Blood pressure 158/82  Chest clear breath sounds  Heart regular rate rhythm  Abdominal exam is bowel sounds soft nontender no hepatosplenomegaly abdominal masses  2+ carotid pulses no bruits 2+ pedal pulses  Extremities no edema    Impression   Hypertension  Hyperlipidemia  Pre diabetes  Coronary artery calcification  Paroxysmal atrial fibrillation  Episodic nocturia   Episodic chest pain    Plan   Routine labs  Hydrochlorothiazide 12.5 mg once a day for 1 month given he is let me know responds  Dobutamine stress echo

## 2023-12-21 ENCOUNTER — HOSPITAL ENCOUNTER (OUTPATIENT)
Dept: CARDIOLOGY | Facility: HOSPITAL | Age: 63
Discharge: HOME | End: 2023-12-21
Attending: INTERNAL MEDICINE
Payer: COMMERCIAL

## 2023-12-21 ENCOUNTER — PATIENT MESSAGE (OUTPATIENT)
Dept: INTERNAL MEDICINE | Facility: CLINIC | Age: 63
End: 2023-12-21
Payer: COMMERCIAL

## 2023-12-21 VITALS — BODY MASS INDEX: 36.65 KG/M2 | HEIGHT: 65 IN | WEIGHT: 220 LBS

## 2023-12-21 DIAGNOSIS — E78.5 HYPERLIPIDEMIA, UNSPECIFIED HYPERLIPIDEMIA TYPE: Primary | ICD-10-CM

## 2023-12-21 DIAGNOSIS — R07.89 MID STERNAL CHEST PAIN: ICD-10-CM

## 2023-12-21 DIAGNOSIS — I25.84 CORONARY ARTERY CALCIFICATION: ICD-10-CM

## 2023-12-21 DIAGNOSIS — I25.10 CORONARY ARTERY CALCIFICATION: ICD-10-CM

## 2023-12-21 DIAGNOSIS — E11.9 TYPE 2 DIABETES MELLITUS WITHOUT COMPLICATION, WITHOUT LONG-TERM CURRENT USE OF INSULIN: ICD-10-CM

## 2023-12-21 LAB
BSA FOR ECHO PROCEDURE: 2.14 M2
CV STRESS BASE HR: 58 BPM
DIASTOLIC BLOOD PRESSURE: 71 MMHG
OHS CV CPX 85 PERCENT MAX PREDICTED HEART RATE MALE: 135
OHS CV CPX MAX PREDICTED HEART RATE: 159
OHS CV CPX PATIENT IS FEMALE: 0
OHS CV CPX PATIENT IS MALE: 1
OHS CV CPX PEAK DIASTOLIC BLOOD PRESSURE: 70 MMHG
OHS CV CPX PEAK HEAR RATE: 137 BPM
OHS CV CPX PEAK RATE PRESSURE PRODUCT: NORMAL
OHS CV CPX PEAK SYSTOLIC BLOOD PRESSURE: 187 MMHG
OHS CV CPX PERCENT MAX PREDICTED HEART RATE ACHIEVED: 86
OHS CV CPX RATE PRESSURE PRODUCT PRESENTING: 7772
OHS CV INITIAL DOSE: 10 MCG/KG/MIN
OHS CV PEAK DOSE: 40 MCG/KG/MIN
SYSTOLIC BLOOD PRESSURE: 134 MMHG

## 2023-12-21 PROCEDURE — 93351 STRESS TTE COMPLETE: CPT

## 2023-12-21 PROCEDURE — 63600175 PHARM REV CODE 636 W HCPCS: Performed by: INTERNAL MEDICINE

## 2023-12-21 PROCEDURE — 25000003 PHARM REV CODE 250: Performed by: INTERNAL MEDICINE

## 2023-12-21 RX ORDER — DOBUTAMINE HYDROCHLORIDE 400 MG/100ML
10 INJECTION, SOLUTION INTRAVENOUS ONCE
Status: COMPLETED | OUTPATIENT
Start: 2023-12-21 | End: 2023-12-21

## 2023-12-21 RX ADMIN — DEXTROSE MONOHYDRATE 10 MCG/KG/MIN: 50 INJECTION, SOLUTION INTRAVENOUS at 03:12

## 2023-12-21 NOTE — NURSING
1455: Pt on table, ready for test. Pt states all allergies and denies hx of glaucoma.    1455: Connected to monitor EKG and NIBP; /71; HR 68.    1515: TATIANNA Yu at bedside to explain procedure and obtain consent.    1517: Test started per protocol, see EKG sheet for VS.    1517: Dobutamine started at 10mcg to increase HR, target .    1520: Dobutamine increased to 20mcg, pt tolerating well. Pt given hand ball to squeeze and instructed to do leg exercises.     1523: Dobutamine increased to 30mcg. BP: 185/61.    1526: Dobutamine increased to 40mcg.    1528: Target HR obtained. Pt tolerated test well. Denied any c/o chest pain and/or SOB.     1528: Testing phase completed, dobutamine off, NS up at rapid rate for recovery phase. BP: 187/70.    1540: Recovery phase completed. Pt states feels normal, no distress, NS d/c'd, approx  200cc infused. Iv d/c'd, pt released to cardio.

## 2023-12-21 NOTE — PROGRESS NOTES
Internal medicine review note    Labs and stress test results reviewed.  Stress test was negative for ischemia.  Resting blood pressure reading was improved since starting the hydrochlorothiazide    Labs noted particularly if hemoglobin A1c 6.5 and triglycerides 420    Treatment options were discussed but he prefers to see what he can do to better dietary habits.    Set up repeat visit or repeat labs in 3 months    Answers submitted by the patient for this visit:  Review of Systems Questionnaire (Submitted on 12/18/2023)  activity change: No  unexpected weight change: No  neck pain: No  hearing loss: No  rhinorrhea: No  trouble swallowing: No  eye discharge: No  visual disturbance: No  chest tightness: No  wheezing: No  chest pain: No  palpitations: Yes  blood in stool: No  constipation: No  vomiting: No  diarrhea: No  polydipsia: No  polyuria: Yes  difficulty urinating: No  urgency: Yes  hematuria: No  joint swelling: No  arthralgias: No  headaches: No  weakness: No  confusion: No  dysphoric mood: No

## 2024-01-09 RX ORDER — HYDROCHLOROTHIAZIDE 12.5 MG/1
12.5 TABLET ORAL
Qty: 90 TABLET | Refills: 3 | Status: SHIPPED | OUTPATIENT
Start: 2024-01-09

## 2024-01-09 NOTE — TELEPHONE ENCOUNTER
No care due was identified.  [facility] Embedded Care Due Messages. Reference number: [id number].   1/09/2024 10:31:33 AM CST

## 2024-01-09 NOTE — TELEPHONE ENCOUNTER
Refill Routing Note   Medication(s) are not appropriate for processing by [facility] for the following reason(s):        Required vitals abnormal  New or recently adjusted medication    [facility] action(s):  Defer               Appointments  past 12m or future 3m with PCP    Date Provider   Last Visit   12/18/2023 Tarik Sandoval MD   Next Visit   Visit date not found Tarik Sandoval MD   ED visits in past 90 days: 0        Note composed:11:14 AM 01/09/2024

## 2024-01-26 ENCOUNTER — OFFICE VISIT (OUTPATIENT)
Dept: INTERNAL MEDICINE | Facility: CLINIC | Age: 64
End: 2024-01-26
Payer: COMMERCIAL

## 2024-01-26 VITALS
TEMPERATURE: 99 F | DIASTOLIC BLOOD PRESSURE: 86 MMHG | HEIGHT: 65 IN | WEIGHT: 222 LBS | OXYGEN SATURATION: 95 % | HEART RATE: 73 BPM | SYSTOLIC BLOOD PRESSURE: 136 MMHG | BODY MASS INDEX: 36.99 KG/M2

## 2024-01-26 DIAGNOSIS — J06.9 VIRAL URI WITH COUGH: Primary | ICD-10-CM

## 2024-01-26 PROCEDURE — 99214 OFFICE O/P EST MOD 30 MIN: CPT | Mod: S$GLB,,, | Performed by: INTERNAL MEDICINE

## 2024-01-26 PROCEDURE — 99999 PR PBB SHADOW E&M-EST. PATIENT-LVL IV: CPT | Mod: PBBFAC,,, | Performed by: INTERNAL MEDICINE

## 2024-01-26 RX ORDER — PROMETHAZINE HYDROCHLORIDE AND DEXTROMETHORPHAN HYDROBROMIDE 6.25; 15 MG/5ML; MG/5ML
5 SYRUP ORAL EVERY 4 HOURS PRN
Qty: 120 ML | Refills: 0 | Status: SHIPPED | OUTPATIENT
Start: 2024-01-26

## 2024-01-26 RX ORDER — ALBUTEROL SULFATE 90 UG/1
2 AEROSOL, METERED RESPIRATORY (INHALATION) EVERY 6 HOURS PRN
Qty: 18 G | Refills: 0 | Status: SHIPPED | OUTPATIENT
Start: 2024-01-26

## 2024-01-26 NOTE — PROGRESS NOTES
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, knees, and elbows)  Osteoarthritis  COVID-19 infection December 2021  Bilateral elbow surgery, left ankle surgery as an infant  Right arthroscopic knee surgery  Bilateral total hip arthroplasty  Left ulnar nerve transposition     Social history  Tobacco use none, alcohol use limited  Exercise by running a recumbent bike        MEDICATIONS  Meloxicam 7.5 mg  Verapamil  mg daily  Aspirin 81 mg daily  Atorvastatin 20 mg daily  Mavik 2 mg daily  Losartan 100 mg daily  Vitamin-D 2000 units daily  Hydrochlorothiazide 12.5 mg daily      63-year-old male  Eight days ago started having low-grade fever 99.6 that lasted for 1 day later associated with having a nonproductive cough, episodic wheezing.  Irritated throat.  The cough and the wheezing has still persisted.  He does not feel short of breath.  Throat has improved somewhat.  That has no nasal head congestion.      Examination   Weight 222 lb   Pulse 72   Blood pressure 148/82  Temperature 99.1°   Pulse ox 95%   Tympanic membranes normal   Nasal mucosa is clear  Oropharynx hyperemic  Neck no adenopathy  Chest clear breath sounds.  A cough was not induced but there was no wheezes rales  Heart regular rate and rhythm    Impression   Viral upper respiratory infection with cough what reactive airway disease    Plan   Albuterol inhaler 2 puffs 4 times a day as needed  Promethazine DM as needed for the cough  The me know if he develops any further fever, shortness a breath or productive cough of discolored mucus

## 2024-02-04 ENCOUNTER — HOSPITAL ENCOUNTER (EMERGENCY)
Facility: HOSPITAL | Age: 64
Discharge: HOME | End: 2024-02-04
Attending: STUDENT IN AN ORGANIZED HEALTH CARE EDUCATION/TRAINING PROGRAM
Payer: COMMERCIAL

## 2024-02-04 VITALS
DIASTOLIC BLOOD PRESSURE: 76 MMHG | TEMPERATURE: 99 F | RESPIRATION RATE: 20 BRPM | BODY MASS INDEX: 34.66 KG/M2 | SYSTOLIC BLOOD PRESSURE: 111 MMHG | HEART RATE: 81 BPM | HEIGHT: 65 IN | OXYGEN SATURATION: 96 % | WEIGHT: 208 LBS

## 2024-02-04 DIAGNOSIS — R91.1 LUNG NODULE: ICD-10-CM

## 2024-02-04 DIAGNOSIS — J10.1 INFLUENZA A: Primary | ICD-10-CM

## 2024-02-04 DIAGNOSIS — R05.9 COUGH: ICD-10-CM

## 2024-02-04 LAB
ALBUMIN SERPL BCP-MCNC: 3.7 G/DL (ref 3.5–5.2)
ALP SERPL-CCNC: 85 U/L (ref 55–135)
ALT SERPL W/O P-5'-P-CCNC: 60 U/L (ref 10–44)
ANION GAP SERPL CALC-SCNC: 15 MMOL/L (ref 8–16)
AST SERPL-CCNC: 61 U/L (ref 10–40)
BASOPHILS # BLD AUTO: 0.02 K/UL (ref 0–0.2)
BASOPHILS NFR BLD: 0.3 % (ref 0–1.9)
BILIRUB SERPL-MCNC: 0.9 MG/DL (ref 0.1–1)
BUN SERPL-MCNC: 19 MG/DL (ref 8–23)
CALCIUM SERPL-MCNC: 9.1 MG/DL (ref 8.7–10.5)
CHLORIDE SERPL-SCNC: 107 MMOL/L (ref 95–110)
CO2 SERPL-SCNC: 20 MMOL/L (ref 23–29)
CREAT SERPL-MCNC: 1.3 MG/DL (ref 0.5–1.4)
DIFFERENTIAL METHOD BLD: NORMAL
EOSINOPHIL # BLD AUTO: 0 K/UL (ref 0–0.5)
EOSINOPHIL NFR BLD: 0.5 % (ref 0–8)
ERYTHROCYTE [DISTWIDTH] IN BLOOD BY AUTOMATED COUNT: 13.6 % (ref 11.5–14.5)
EST. GFR  (NO RACE VARIABLE): >60 ML/MIN/1.73 M^2
GLUCOSE SERPL-MCNC: 180 MG/DL (ref 70–110)
HCT VFR BLD AUTO: 50.4 % (ref 40–54)
HGB BLD-MCNC: 17 G/DL (ref 14–18)
IMM GRANULOCYTES # BLD AUTO: 0.01 K/UL (ref 0–0.04)
IMM GRANULOCYTES NFR BLD AUTO: 0.2 % (ref 0–0.5)
INFLUENZA A, MOLECULAR: POSITIVE
INFLUENZA B, MOLECULAR: NEGATIVE
LYMPHOCYTES # BLD AUTO: 1.5 K/UL (ref 1–4.8)
LYMPHOCYTES NFR BLD: 23.5 % (ref 18–48)
MCH RBC QN AUTO: 28.9 PG (ref 27–31)
MCHC RBC AUTO-ENTMCNC: 33.7 G/DL (ref 32–36)
MCV RBC AUTO: 86 FL (ref 82–98)
MONOCYTES # BLD AUTO: 0.6 K/UL (ref 0.3–1)
MONOCYTES NFR BLD: 8.8 % (ref 4–15)
NEUTROPHILS # BLD AUTO: 4.2 K/UL (ref 1.8–7.7)
NEUTROPHILS NFR BLD: 66.7 % (ref 38–73)
NRBC BLD-RTO: 0 /100 WBC
PLATELET # BLD AUTO: 215 K/UL (ref 150–450)
PMV BLD AUTO: 9.6 FL (ref 9.2–12.9)
POTASSIUM SERPL-SCNC: 3.4 MMOL/L (ref 3.5–5.1)
PROT SERPL-MCNC: 7 G/DL (ref 6–8.4)
RBC # BLD AUTO: 5.89 M/UL (ref 4.6–6.2)
SARS-COV-2 RDRP RESP QL NAA+PROBE: NEGATIVE
SODIUM SERPL-SCNC: 142 MMOL/L (ref 136–145)
SPECIMEN SOURCE: ABNORMAL
WBC # BLD AUTO: 6.34 K/UL (ref 3.9–12.7)

## 2024-02-04 PROCEDURE — 25000003 PHARM REV CODE 250: Performed by: PHYSICIAN ASSISTANT

## 2024-02-04 PROCEDURE — U0002 COVID-19 LAB TEST NON-CDC: HCPCS | Performed by: PHYSICIAN ASSISTANT

## 2024-02-04 PROCEDURE — 96360 HYDRATION IV INFUSION INIT: CPT

## 2024-02-04 PROCEDURE — 85025 COMPLETE CBC W/AUTO DIFF WBC: CPT | Performed by: PHYSICIAN ASSISTANT

## 2024-02-04 PROCEDURE — 87502 INFLUENZA DNA AMP PROBE: CPT | Performed by: PHYSICIAN ASSISTANT

## 2024-02-04 PROCEDURE — 99284 EMERGENCY DEPT VISIT MOD MDM: CPT | Mod: 25

## 2024-02-04 PROCEDURE — 80053 COMPREHEN METABOLIC PANEL: CPT | Performed by: PHYSICIAN ASSISTANT

## 2024-02-04 RX ORDER — ONDANSETRON 4 MG/1
4 TABLET, ORALLY DISINTEGRATING ORAL EVERY 8 HOURS PRN
Qty: 12 TABLET | Refills: 0 | Status: SHIPPED | OUTPATIENT
Start: 2024-02-04

## 2024-02-04 RX ORDER — ONDANSETRON 4 MG/1
4 TABLET, ORALLY DISINTEGRATING ORAL
Status: COMPLETED | OUTPATIENT
Start: 2024-02-04 | End: 2024-02-04

## 2024-02-04 RX ORDER — OSELTAMIVIR PHOSPHATE 75 MG/1
75 CAPSULE ORAL 2 TIMES DAILY
Qty: 9 CAPSULE | Refills: 0 | Status: SHIPPED | OUTPATIENT
Start: 2024-02-04 | End: 2024-02-09

## 2024-02-04 RX ORDER — OSELTAMIVIR PHOSPHATE 75 MG/1
75 CAPSULE ORAL
Status: COMPLETED | OUTPATIENT
Start: 2024-02-04 | End: 2024-02-04

## 2024-02-04 RX ADMIN — ONDANSETRON 4 MG: 4 TABLET, ORALLY DISINTEGRATING ORAL at 05:02

## 2024-02-04 RX ADMIN — OSELTAMIVIR PHOSPHATE 75 MG: 75 CAPSULE ORAL at 08:02

## 2024-02-04 RX ADMIN — SODIUM CHLORIDE 1000 ML: 9 INJECTION, SOLUTION INTRAVENOUS at 07:02

## 2024-02-04 NOTE — FIRST PROVIDER EVALUATION
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 Patient vomiting in triage. Does not appear septic.       ORDERS  Labs Reviewed - No data to display    ED Orders (720h ago, onward)      None              Virtual Visit Note: The provider triage portion of this emergency department evaluation and documentation was performed via [employer], a HIPAA-compliant telemedicine application, in concert with a tele-presenter in the room. A face to face patient evaluation with one of my colleagues will occur once the patient is placed in an emergency department room.      DISCLAIMER: This note was prepared with [employer] voice recognition transcription software. Garbled syntax, mangled pronouns, and other bizarre constructions may be attributed to that software system.

## 2024-02-05 ENCOUNTER — DOCUMENTATION ONLY (OUTPATIENT)
Dept: INTERNAL MEDICINE | Facility: CLINIC | Age: 64
End: 2024-02-05
Payer: COMMERCIAL

## 2024-02-05 DIAGNOSIS — K76.0 HEPATIC STEATOSIS: ICD-10-CM

## 2024-02-05 DIAGNOSIS — R91.1 PULMONARY NODULE, RIGHT: Primary | ICD-10-CM

## 2024-02-05 NOTE — DISCHARGE INSTRUCTIONS
Follow-up with your primary doctor in 1 week if your symptoms are not improving.    Your chest x-ray showed a lung nodule.  This should be monitored by her primary doctor.  There was no evidence of pneumonia on your chest x-ray.     Return to the ER immediately for any new or significantly worsening symptoms such as oxygen levels 93% or lower, difficulty breathing, uncontrolled vomiting or any other worrisome symptoms.

## 2024-02-05 NOTE — PROGRESS NOTES
Internal medicine note    Follow-up regarding patient's emergency room visit February 4th for fever.  Diagnosed with influenza    Chest x-ray showed the possibility of the nodule in the right lower lung.  This has not been detected before and will arrange for CT scan    Transaminases again elevated suspect that of fatty liver.  Recommend pursue ultrasound of the liver    Glucose was 180 at the time but he was drinking a lot of Gatorade at the time but he was bordering on diabetes in the importance of diet was discussed

## 2024-02-05 NOTE — ED TRIAGE NOTES
Pt reports cough, congestion, low grade fever, wheezing    LOC: The patient is awake, alert and aware of environment with an appropriate affect, the patient is oriented x 3 and speaking appropriately.  APPEARANCE: Patient resting comfortably and in no acute distress, patient is clean and well groomed, patient's clothing is properly fastened.  SKIN: The skin is warm and dry, color consistent with ethnicity, patient has normal skin turgor and moist mucus membranes, skin intact, no breakdown or bruising noted.  MUSCULOSKELETAL: Patient moving all extremities spontaneously, no obvious swelling or deformities noted.  RESPIRATORY: Airway is open and patent, respirations are spontaneous, patient has a normal effort and rate, no accessory muscle use noted,   CARDIAC: Patient has a normal rate and regular rhythm, no periphreal edema noted, capillary refill < 3 seconds.  ABDOMEN: Soft and non tender to palpation, no distention noted, normoactive bowel sounds present in all four quadrants.  NEUROLOGIC:  facial expression is symmetrical, patient moving all extremities spontaneously, normal sensation in all extremities when touched with a finger.  Follows all commands appropriately.

## 2024-02-05 NOTE — ED PROVIDER NOTES
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  Patient reports cough productive of clear sputum, fever up to 103°, fatigue, shortness of breath, nausea, vomiting, diarrhea for the past 4 days.  Patient states that he had fever and cough about 1.5 weeks ago.  He states that his symptoms improved before his more recent symptoms developed.  Patient is a teacher.  When he was initially sick, he did not test for COVID or flu.  Patient was noted to be vomiting in the waiting room.  He received Zofran ODT ordered by tele triage.  He does report improvement in his nausea following treatment.       Review of patient's allergies indicates:   Allergen Reactions    Ultram [tramadol] Other (See Comments)     disoriented    Robaxin [methocarbamol]      Pt believes this incited A.fib    Tramadol      Other reaction(s): Dizziness     Past Medical History:   Diagnosis Date    Arthritis     Arthrogryposis     Hyperlipidemia     Hypertension     PAF (paroxysmal atrial fibrillation)     Personal history of colonic polyps      Past Surgical History:   Procedure Laterality Date    ANKLE SURGERY Left 1964    Klever procedure    APPLICATION OF SPICA CAST  1962-63    COLONOSCOPY  2014    COLONOSCOPY N/A 7/21/2023    Procedure: COLONOSCOPY;  Surgeon: Reyna Bear MD;  Location: [facility];  Service: Gastroenterology;  Laterality: N/A;  referral: Dr. Sandoval, emily, portal / hard stick- ERW  pre call complete, stated he would have a ride -CE    ELBOW SURGERY Bilateral 1965    FRACTURE SURGERY Left 1966    HEMORRHOID SURGERY  2009    HIP ARTHROPLASTY Right 5/27/2019    Procedure: ARTHROPLASTY, HIP;  Surgeon: Willian Covarrubias MD;  Location: [facility] OR;  Service: Orthopedics;  Laterality: Right;  [facility]    JOINT REPLACEMENT  Left 1993    w/reconstruction 2002 bilateral hips    KNEE ARTHROSCOPY Right 1986    TONSILLECTOMY      ULNAR NERVE TRANSPOSITION Left 10/23/2020    Procedure: TRANSPOSITION, NERVE, ULNAR;  Surgeon: Claude S. Williams IV, MD;  Location: [facility];  Service: Orthopedics;  Laterality: Left;    wrist release Bilateral 1965     Family History   Problem Relation Age of Onset    Hypertension Mother     Basal cell carcinoma Mother     Diabetes Father     Heart disease Father         CAD    Basal cell carcinoma Brother     Heart attack Neg Hx     Hyperlipidemia Neg Hx      Social History     Tobacco Use    Smoking status: Never    Smokeless tobacco: Never   Substance Use Topics    Alcohol use: Yes     Comment: limited    Drug use: Never     Review of Systems   Constitutional:  Positive for fever.       Physical Exam     Initial Vitals [02/04/24 1637]   BP Pulse Resp Temp SpO2   106/74 78 20 99.4 °F (37.4 °C) 96 %      MAP       --         Physical Exam    Nursing note and vitals reviewed.  Constitutional: He appears well-developed and well-nourished.  Non-toxic appearance. He does not appear ill. No distress.   Appears unwell   HENT:   Head: Normocephalic and atraumatic.   Neck: Neck supple.   Normal range of motion.  Cardiovascular:  Normal rate and regular rhythm.     Exam reveals no gallop, no distant heart sounds and no friction rub.       No murmur heard.  Pulmonary/Chest: Effort normal and breath sounds normal. No accessory muscle usage. No tachypnea. No respiratory distress. He has no decreased breath sounds. He has no wheezes. He has no rhonchi. He has no rales.   Abdominal: He exhibits no distension.   Musculoskeletal:      Cervical back: Normal range of motion and neck supple.     Neurological: He is alert.   Skin: No rash noted.         ED Course   Procedures  Labs Reviewed   INFLUENZA A & B BY MOLECULAR - Abnormal; Notable for the following components:       Result Value    Influenza A, Molecular Positive (*)      "All other components within normal limits   COMPREHENSIVE METABOLIC PANEL - Abnormal; Notable for the following components:    Potassium 3.4 (*)     CO2 20 (*)     Glucose 180 (*)     AST 61 (*)     ALT 60 (*)     All other components within normal limits   SARS-COV-2 RNA AMPLIFICATION, QUAL   CBC W/ AUTO DIFFERENTIAL          Imaging Results              X-Ray Chest PA And Lateral (Final result)  Result time 02/04/24 20:37:11      Final result by Wander Edward MD (02/04/24 20:37:11)                   Impression:      Questionable small nodule overlying the right lung base, not definitely seen on prior studies.  Consider follow-up outpatient CT chest to further characterize, particularly if the patient has risk factors.    No focal consolidation.      Electronically signed by: Wander Edward MD  Date:    02/04/2024  Time:    20:37               Narrative:    EXAMINATION:  XR CHEST PA AND LATERAL    CLINICAL HISTORY:  Provided history is "  Cough, unspecified".    TECHNIQUE:  Frontal and lateral views of the chest were performed.    COMPARISON:  12/22/2020.    FINDINGS:  Cardiac silhouette is not enlarged. Small nodule overlying the right lung base, potentially a calcified nodule though not definitively seen on prior studies.  No focal consolidation.  No sizable pleural effusion.  No pneumothorax.                                       Medications   ondansetron disintegrating tablet 4 mg (4 mg Oral Given 2/4/24 1728)   sodium chloride 0.9% bolus 1,000 mL 1,000 mL (0 mLs Intravenous Stopped 2/4/24 2023)   oseltamivir capsule 75 mg (75 mg Oral Given 2/4/24 2013)     Medical Decision Making  63-year-old male presents to the ED with flu-like symptoms.  He appears in no acute distress.  Nontoxic appearing.  Afebrile.  Vitals within normal limits.    My differential diagnosis includes but is not limited to:  COVID, flu, URI, viral syndrome, dehydration, pneumonia    Patient tested positive for influenza A.  Chest " x-ray showed a pulmonary nodule.  No evidence of pneumonia or other acute process.  Discussed findings with the patient.  Will treat with Tamiflu.  Will also provide a prescription for Zofran.  After careful consideration of the patient's history, physical exam findings, as well as empirical and objective data obtained throughout ED workup, no immediate, emergent, or life threatening condition/etiology has been identified. No further evaluation or admission was felt to be required and the patient is stable for discharge from the ED. The patient and any additional family/caregivers present were updated with test results, overall clinical impression, and recommended further plan of care, including discharge instructions as provided and outpatient follow-up for continued evaluation and management as needed. All questions were answered. The patient expressed understanding and agreed with current plan for discharge and follow-up plan of care. Strict ED return precautions were provided, including return/worsening of current symptoms, or any other concerns.      Amount and/or Complexity of Data Reviewed  Labs: ordered. Decision-making details documented in ED Course.  Radiology: ordered.    Risk  Prescription drug management.               ED Course as of 02/04/24 2215   Sun Feb 04, 2024 1917 Influenza A, Molecular(!): Positive [EH]      ED Course User Index  [EH] Tiera Brady PA-C                           Clinical Impression:  Final diagnoses:  [R05.9] Cough  [J10.1] Influenza A (Primary)  [R91.1] Lung nodule          ED Disposition Condition    Discharge Stable          ED Prescriptions       Medication Sig Dispense Start Date End Date Auth. Provider    oseltamivir (TAMIFLU) 75 MG capsule Take 1 capsule (75 mg total) by mouth 2 (two) times daily. for 5 days 9 capsule 2/4/2024 2/9/2024 Tiera Brady PA-C    ondansetron (ZOFRAN-ODT) 4 MG TbDL Take 1 tablet (4 mg total) by mouth every 8 (eight) hours as needed  (nausea or vomiting). 12 tablet 2/4/2024 -- Tiera Brady PA-C          Follow-up Information    None          Tiera Brady PA-C  02/04/24 0617

## 2024-02-09 ENCOUNTER — HOSPITAL ENCOUNTER (OUTPATIENT)
Dept: RADIOLOGY | Facility: HOSPITAL | Age: 64
Discharge: HOME | End: 2024-02-09
Attending: INTERNAL MEDICINE
Payer: COMMERCIAL

## 2024-02-09 DIAGNOSIS — K76.0 HEPATIC STEATOSIS: ICD-10-CM

## 2024-02-09 DIAGNOSIS — R91.1 PULMONARY NODULE, RIGHT: ICD-10-CM

## 2024-02-09 PROCEDURE — 71250 CT THORAX DX C-: CPT | Mod: 26,,, | Performed by: RADIOLOGY

## 2024-02-09 PROCEDURE — 76705 ECHO EXAM OF ABDOMEN: CPT | Mod: TC

## 2024-02-09 PROCEDURE — 76705 ECHO EXAM OF ABDOMEN: CPT | Mod: 26,,, | Performed by: STUDENT IN AN ORGANIZED HEALTH CARE EDUCATION/TRAINING PROGRAM

## 2024-02-09 PROCEDURE — 71250 CT THORAX DX C-: CPT | Mod: TC

## 2024-02-15 ENCOUNTER — PATIENT MESSAGE (OUTPATIENT)
Dept: INTERNAL MEDICINE | Facility: CLINIC | Age: 64
End: 2024-02-15
Payer: COMMERCIAL

## 2024-02-15 DIAGNOSIS — K76.0 NAFLD (NONALCOHOLIC FATTY LIVER DISEASE): Primary | ICD-10-CM

## 2024-02-16 NOTE — PROGRESS NOTES
Internal medicine review note    A hepatic ultrasound confirmed that hepatic steatosis.  I believe this is consistent with nonalcoholic fatty liver.  Since his last visit with me he is lost 10 lb trying to work through diet.  He will have repeat labs in March to follow-up on liver enzymes and that of pre diabetes.  Will put in referral to meet with hepatology for further assessment      Regarding the CT the lungs.  It was performed because chest x-ray revealed questionable nodule right lower lobe that has been not noted before.    The CT did show prominent nodules in the the left lower lobe, 12 mm, 6 mm.  He has had previous CT scans 2021 in 2019 which was not noted.  Will put in referral to Pulmonary for further assessment but I suspect that to be recommended repeat this in 6-12 months.    The CT scan 2021 was a calcium score CT.  But a month prior he had COVID.  There was evidence of ground-glass opacities at the time    The CT in 2019 was a CTA to rule out pulmonary embolism.  He just had hip replacement and had reaction to the use of methocarbamol at the time

## 2024-03-10 ENCOUNTER — PATIENT MESSAGE (OUTPATIENT)
Dept: INTERNAL MEDICINE | Facility: CLINIC | Age: 64
End: 2024-03-10
Payer: COMMERCIAL

## 2024-03-11 RX ORDER — HYDROCHLOROTHIAZIDE 12.5 MG/1
12.5 TABLET ORAL DAILY
Qty: 90 TABLET | Refills: 3 | Status: SHIPPED | OUTPATIENT
Start: 2024-03-11

## 2024-03-11 NOTE — TELEPHONE ENCOUNTER
Pt requesting med to be sent to [facility], pended     LOV with Tarik Sandoval MD , 1/26/2024

## 2024-03-11 NOTE — TELEPHONE ENCOUNTER
Refill Routing Note   Medication(s) are not appropriate for processing by [facility] for the following reason(s):        Required labs abnormal  New or recently adjusted medication    [facility] action(s):  Defer               Appointments  past 12m or future 3m with PCP    Date Provider   Last Visit   1/26/2024 Tarik Sandoval MD   Next Visit   Visit date not found Tarik Sandoval MD   ED visits in past 90 days: 1        Note composed:12:44 PM 03/11/2024

## 2024-03-11 NOTE — TELEPHONE ENCOUNTER
No care due was identified.  [facility] Embedded Care Due Messages. Reference number: [id number].   3/11/2024 8:28:26 AM CDT

## 2024-03-18 ENCOUNTER — LAB VISIT (OUTPATIENT)
Dept: LAB | Facility: HOSPITAL | Age: 64
End: 2024-03-18
Attending: INTERNAL MEDICINE
Payer: COMMERCIAL

## 2024-03-18 DIAGNOSIS — E78.5 HYPERLIPIDEMIA, UNSPECIFIED HYPERLIPIDEMIA TYPE: ICD-10-CM

## 2024-03-18 DIAGNOSIS — E11.9 TYPE 2 DIABETES MELLITUS WITHOUT COMPLICATION, WITHOUT LONG-TERM CURRENT USE OF INSULIN: ICD-10-CM

## 2024-03-18 LAB
ALT SERPL W/O P-5'-P-CCNC: 32 U/L (ref 10–44)
ANION GAP SERPL CALC-SCNC: 10 MMOL/L (ref 8–16)
AST SERPL-CCNC: 27 U/L (ref 10–40)
BUN SERPL-MCNC: 23 MG/DL (ref 8–23)
CALCIUM SERPL-MCNC: 9.3 MG/DL (ref 8.7–10.5)
CHLORIDE SERPL-SCNC: 105 MMOL/L (ref 95–110)
CHOLEST SERPL-MCNC: 138 MG/DL (ref 120–199)
CHOLEST/HDLC SERPL: 3.8 {RATIO} (ref 2–5)
CO2 SERPL-SCNC: 22 MMOL/L (ref 23–29)
CREAT SERPL-MCNC: 0.8 MG/DL (ref 0.5–1.4)
EST. GFR  (NO RACE VARIABLE): >60 ML/MIN/1.73 M^2
ESTIMATED AVG GLUCOSE: 126 MG/DL (ref 68–131)
GLUCOSE SERPL-MCNC: 121 MG/DL (ref 70–110)
HBA1C MFR BLD: 6 % (ref 4–5.6)
HDLC SERPL-MCNC: 36 MG/DL (ref 40–75)
HDLC SERPL: 26.1 % (ref 20–50)
LDLC SERPL CALC-MCNC: 78 MG/DL (ref 63–159)
NONHDLC SERPL-MCNC: 102 MG/DL
POTASSIUM SERPL-SCNC: 3.8 MMOL/L (ref 3.5–5.1)
SODIUM SERPL-SCNC: 137 MMOL/L (ref 136–145)
TRIGL SERPL-MCNC: 120 MG/DL (ref 30–150)

## 2024-03-18 PROCEDURE — 80061 LIPID PANEL: CPT | Performed by: INTERNAL MEDICINE

## 2024-03-18 PROCEDURE — 36415 COLL VENOUS BLD VENIPUNCTURE: CPT | Performed by: INTERNAL MEDICINE

## 2024-03-18 PROCEDURE — 80048 BASIC METABOLIC PNL TOTAL CA: CPT | Performed by: INTERNAL MEDICINE

## 2024-03-18 PROCEDURE — 83036 HEMOGLOBIN GLYCOSYLATED A1C: CPT | Performed by: INTERNAL MEDICINE

## 2024-03-18 PROCEDURE — 84460 ALANINE AMINO (ALT) (SGPT): CPT | Performed by: INTERNAL MEDICINE

## 2024-03-18 PROCEDURE — 84450 TRANSFERASE (AST) (SGOT): CPT | Performed by: INTERNAL MEDICINE

## 2024-04-15 ENCOUNTER — IMMUNIZATION (OUTPATIENT)
Dept: INTERNAL MEDICINE | Facility: CLINIC | Age: 64
End: 2024-04-15
Payer: COMMERCIAL

## 2024-04-15 DIAGNOSIS — Z23 NEED FOR VACCINATION: Primary | ICD-10-CM

## 2024-04-15 PROCEDURE — 91322 SARSCOV2 VAC 50 MCG/0.5ML IM: CPT | Mod: S$GLB,,, | Performed by: INTERNAL MEDICINE

## 2024-04-15 PROCEDURE — 90480 ADMN SARSCOV2 VAC 1/ONLY CMP: CPT | Mod: S$GLB,,, | Performed by: INTERNAL MEDICINE

## 2024-06-10 ENCOUNTER — PATIENT MESSAGE (OUTPATIENT)
Dept: INTERNAL MEDICINE | Facility: CLINIC | Age: 64
End: 2024-06-10
Payer: COMMERCIAL

## 2024-07-22 RX ORDER — LOSARTAN POTASSIUM 100 MG/1
100 TABLET ORAL
Qty: 90 TABLET | Refills: 3 | Status: SHIPPED | OUTPATIENT
Start: 2024-07-22

## 2024-07-22 RX ORDER — ATORVASTATIN CALCIUM 20 MG/1
20 TABLET, FILM COATED ORAL
Qty: 90 TABLET | Refills: 3 | Status: SHIPPED | OUTPATIENT
Start: 2024-07-22

## 2024-07-22 RX ORDER — VERAPAMIL HYDROCHLORIDE 180 MG/1
180 TABLET, FILM COATED, EXTENDED RELEASE ORAL
Qty: 90 TABLET | Refills: 3 | Status: SHIPPED | OUTPATIENT
Start: 2024-07-22

## 2024-07-22 RX ORDER — TRANDOLAPRIL 2 MG/1
2 TABLET ORAL
Qty: 90 TABLET | Refills: 3 | Status: SHIPPED | OUTPATIENT
Start: 2024-07-22

## 2024-07-22 NOTE — TELEPHONE ENCOUNTER
No care due was identified.  Health [facility] Embedded Care Due Messages. Reference number: [id number].   7/22/2024 1:07:11 AM CDT

## 2024-07-29 ENCOUNTER — NURSE TRIAGE (OUTPATIENT)
Dept: ADMINISTRATIVE | Facility: CLINIC | Age: 64
End: 2024-07-29
Payer: COMMERCIAL

## 2024-07-29 NOTE — TELEPHONE ENCOUNTER
Patient c/o tingling to his wrist after exposure to a dust product. Advised per protocol be seen at  now. Patient verbalizes understanding.  Advised the patient to call back with any further questions or if symptoms worsen.      Reason for Disposition   Tingling (e.g., pins and needles) of the face, arm or leg on one side of the body, that is present now (Exceptions: Chronic or recurrent symptom lasting > 4 weeks; or tingling from known cause, such as: bumped elbow, carpal tunnel syndrome, pinched nerve, frostbite.)    Additional Information   Negative: Difficult to awaken or acting confused (e.g., disoriented, slurred speech)   Negative: New neurologic deficit that is present NOW, sudden onset of ANY of the following: * Weakness of the face, arm, or leg on one side of the body* Numbness of the face, arm, or leg on one side of the body* Loss of speech or garbled speech   Negative: Sounds like a life-threatening emergency to the triager   Negative: SEVERE weakness (i.e., unable to walk or barely able to walk, requires support) and new-onset or worsening   Negative: Headache (with neurologic deficit)   Negative: Unable to urinate (or only a few drops) and bladder feels very full   Negative: Loss of bladder or bowel control (urine or bowel incontinence; wetting self, leaking stool) of new-onset   Negative: Back pain with numbness (loss of sensation) in groin or rectal area   Negative: Neurologic deficit that was brief (now gone), ANY of the following: * Weakness of the face, arm, or leg on one side of the body * Numbness of the face, arm, or leg on one side of the body * Loss of speech or garbled speech   Negative: Patient sounds very sick or weak to the triager   Negative: Neurologic deficit of gradual onset (e.g., days to weeks), ANY of the following: * Weakness of the face, arm, or leg on one side of the body* Numbness of the face, arm, or leg on one side of the body* Loss of speech or garbled speech   Negative:  [place] palsy suspected (i.e., weakness only one side of the face, developing over hours to days, no other symptoms)    Protocols used: Neurologic Deficit-A-OH

## 2024-08-01 RX ORDER — MELOXICAM 7.5 MG/1
TABLET ORAL
Qty: 90 TABLET | Refills: 3 | Status: SHIPPED | OUTPATIENT
Start: 2024-08-01

## 2024-08-01 NOTE — TELEPHONE ENCOUNTER
Refill Routing Note   Medication(s) are not appropriate for processing by [facility] for the following reason(s):        Outside of protocol    [facility] action(s):  Route               Appointments  past 12m or future 3m with PCP    Date Provider   Last Visit   1/26/2024 Tarik Sandoval MD   Next Visit   Visit date not found Tarik Sandoval MD   ED visits in past 90 days: 0        Note composed:8:14 AM 08/01/2024

## 2024-08-01 NOTE — TELEPHONE ENCOUNTER
No care due was identified.  [facility] Embedded Care Due Messages. Reference number: [id number].   8/01/2024 12:39:16 AM CDT

## 2024-08-19 ENCOUNTER — ATHLETIC TRAINING SESSION (OUTPATIENT)
Dept: SPORTS MEDICINE | Facility: CLINIC | Age: 64
End: 2024-08-19
Payer: COMMERCIAL

## 2024-08-19 DIAGNOSIS — Z00.00 HEALTH CARE MAINTENANCE: Primary | ICD-10-CM

## 2024-08-19 DIAGNOSIS — R91.8 LUNG NODULE, MULTIPLE: Primary | ICD-10-CM

## 2024-08-20 NOTE — PROGRESS NOTES
Reason for Encounter N/A  Health Maintenance      Subjective:       Chief Complaint: Junior Beatty is a 64 y.o. male student at [employer]) who had no chief complaint listed for this encounter.    Handedness: right-handed  Sport played:      Level:          Junior also participates in fishing.      ROS              Objective:       General: Junior is well-developed, well-nourished, appears stated age, in no acute distress, alert and oriented to time, place and person.     AT Session          Assessment:     Status: AT - Cleared to Exert          Treatment/Rehab/Maintenance:     Thera Gun Massage      Plan:       1. Thera Gun Massage  2. Physician Referral: no  3. ED Referral:no  4. Parent/Guardian Notified: No  5. All questions were answered, ath. will contact me for questions or concerns in  the interim.  6.         Eligible to use School Insurance: No, not a school related injury

## 2024-08-30 ENCOUNTER — ATHLETIC TRAINING SESSION (OUTPATIENT)
Dept: SPORTS MEDICINE | Facility: CLINIC | Age: 64
End: 2024-08-30
Payer: COMMERCIAL

## 2024-08-30 DIAGNOSIS — Z00.00 HEALTH CARE MAINTENANCE: Primary | ICD-10-CM

## 2024-08-30 NOTE — PROGRESS NOTES
Reason for Encounter N/A    Healthcare Maintenance  Subjective:       Chief Complaint: Junior Beatty is a 64 y.o. male student at [employer]) who had no chief complaint listed for this encounter.    Handedness: right-handed  Sport played:      Level:          Junior also participates in bicycling.      ROS              Objective:       General: Junior is well-developed, well-nourished, appears stated age, in no acute distress, alert and oriented to time, place and person.     AT Session          Assessment:     Status: AT - Cleared to Exert    Date Seen: 8/30/2024    Date of Injury:     Date Out:     Date Cleared:         Treatment/Rehab/Maintenance:     Thera Gun Massage      Plan:       1. Thera Gun Massage  2. Physician Referral: no  3. ED Referral:no  4. Parent/Guardian Notified: No  5. All questions were answered, ath. will contact me for questions or concerns in  the interim.  6.         Eligible to use School Insurance: No, not a school related injury

## 2024-10-03 ENCOUNTER — OFFICE VISIT (OUTPATIENT)
Dept: HEPATOLOGY | Facility: CLINIC | Age: 64
End: 2024-10-03
Payer: COMMERCIAL

## 2024-10-03 VITALS — WEIGHT: 218.94 LBS | BODY MASS INDEX: 36.48 KG/M2 | HEIGHT: 65 IN

## 2024-10-03 DIAGNOSIS — E66.01 CLASS 2 SEVERE OBESITY DUE TO EXCESS CALORIES WITH SERIOUS COMORBIDITY AND BODY MASS INDEX (BMI) OF 36.0 TO 36.9 IN ADULT: ICD-10-CM

## 2024-10-03 DIAGNOSIS — E66.812 CLASS 2 SEVERE OBESITY DUE TO EXCESS CALORIES WITH SERIOUS COMORBIDITY AND BODY MASS INDEX (BMI) OF 36.0 TO 36.9 IN ADULT: ICD-10-CM

## 2024-10-03 DIAGNOSIS — R79.89 ELEVATED FERRITIN: ICD-10-CM

## 2024-10-03 DIAGNOSIS — E78.5 HYPERLIPIDEMIA, UNSPECIFIED HYPERLIPIDEMIA TYPE: Chronic | ICD-10-CM

## 2024-10-03 DIAGNOSIS — R73.03 PRE-DIABETES: ICD-10-CM

## 2024-10-03 DIAGNOSIS — K76.0 METABOLIC DYSFUNCTION-ASSOCIATED STEATOTIC LIVER DISEASE (MASLD): Primary | ICD-10-CM

## 2024-10-03 DIAGNOSIS — I10 ESSENTIAL HYPERTENSION: Chronic | ICD-10-CM

## 2024-10-03 PROBLEM — U07.1 COVID-19: Status: RESOLVED | Noted: 2020-12-22 | Resolved: 2024-10-03

## 2024-10-03 PROCEDURE — 99999 PR PBB SHADOW E&M-EST. PATIENT-LVL V: CPT | Mod: PBBFAC,,, | Performed by: NURSE PRACTITIONER

## 2024-10-03 RX ORDER — AMOXICILLIN 500 MG/1
CAPSULE ORAL
COMMUNITY
Start: 2024-07-11

## 2024-10-03 NOTE — PROGRESS NOTES
[facility] VISIT NEW PT NOTE    REFERRING PROVIDER:  Dr. Tarik Sandoval  PCP: Tarik Sandoval MD     CHIEF COMPLAINT: Metabolic associated steatotic liver disease, elevated ferritin       HPI: This is a 64 y.o. patient with PMH noted below, presenting for evaluation of above    Previous serologic w/u - will screen for Hep B and C and repeat ferritin     Prior serologic workup:   Lab Results   Component Value Date    FERRITIN 2,507 (H) 12/22/2020    HEPCAB Non-reactive 12/02/2022     Risk factors for fatty liver include obesity, HTN, HLD, preDM    Liver fibrosis staging:  -- fibroscan soon     Interval HPI: Presents today alone  No SSB  Rare alcohol  Rare fried/fast foods  Exercising 5-6 times per week: 30 minutes and bike riding   Limiting carbs and portions     Labs done 3/2024 show normal transaminase levels       Lab Results   Component Value Date    ALT 32 03/18/2024    AST 27 03/18/2024    ALKPHOS 85 02/04/2024    BILITOT 0.9 02/04/2024    ALBUMIN 3.7 02/04/2024    INR 0.9 12/22/2020     02/04/2024       Abd U/S done 2/2024 showed fatty liver, focal fatty sparing     Denies family history of liver disease .  Denies Alcohol consumption, see below  Social History     Substance and Sexual Activity   Alcohol Use Not Currently       Immunity to Hep A and B - will check with next labs          Allergy and medication list reviewed and updated     PMHX:  has a past medical history of Arthritis, Arthrogryposis, Hyperlipidemia, Hypertension, PAF (paroxysmal atrial fibrillation), and Personal history of colonic polyps.    PSHX:  has a past surgical history that includes Application of spica cast (1962-63); Ankle surgery (Left, 1964); Elbow surgery (Bilateral, 1965); wrist release (Bilateral, 1965); Fracture surgery (Left, 1966); Tonsillectomy; Knee arthroscopy (Right, 1986); Hemorrhoid surgery (2009); Colonoscopy (2014); Hip Arthroplasty (Right, 5/27/2019); Joint replacement (Left, 1993); Ulnar  "nerve transposition (Left, 10/23/2020); and Colonoscopy (N/A, 7/21/2023).    FAMILY HISTORY: Updated and reviewed in EPIC    ROS:   GENERAL: Denies fatigue  CARDIOVASCULAR: Denies edema  GI: Denies abdominal pain  SKIN: Denies rash, itching   NEURO: Denies confusion, memory loss, or mood changes    PHYSICAL EXAM:   In no acute distress; alert and oriented to person, place and time  VITALS: Ht 5' 5" (1.651 m)   Wt 99.3 kg (218 lb 14.7 oz)   BMI 36.43 kg/m²   EYES: Sclerae anicteric  GI: Soft, non-tender, non-distended. No ascites.  EXTREMITIES:  No edema.  SKIN: Warm and dry. No jaundice. No telangectasias noted. No palmar erythema.  NEURO:  No asterixis.  PSYCH:  Thought and speech pattern appropriate. Behavior normal      EDUCATION:  See instructions discussed with patient in Instructions section of the After Visit Summary     ASSESSMENT & PLAN:  64 y.o. male with:  1.  Metabolic associated steatotic liver disease   - see HPI  -- Immunity to Hep A and B : see HPI  -- Fibroscan soon  -- Recommendations discussed with patient:  Limit alcohol consumption  2 Weight loss goal of 25 lbs  3. Low carb/sugar, high fiber and protein diet, limit your carb intake to LESS than 30-45 grams of carbs with a meal or LESS than 5-10 grams with any snack   4. Exercise, 5 days per week, 30 minutes per day, as tolerated  5. Recommend good cholesterol, blood pressure, blood sugar levels   6. There is a new medication called Rezdiffra for the treatment of F2-F3 liver scarring due to fatty liver. It is only indicated for those with stage 2 or 3 scar tissue (will reassess after fibroscan)    2. Elevated ferritin  During covid years ago, will repeat     3. Obesity, HTN, HLD, preDM   -- Body mass index is 36.43 kg/m².   -- increases risk for fatty liver          Follow up for pending results of workup. with labs and fibroscan before  Orders Placed This Encounter   Procedures    FibroScan Transplant Hepatology(Vibration Controlled Transient " Elastography)    Hepatic Function Panel    Hepatitis A antibody, IgG    Hepatitis B Surface Ab, Qualitative    Hepatitis B Core Antibody, Total    Hepatitis B Surface Antigen    Hepatitis C Antibody    Ferritin    Iron and TIBC    Ceruloplasmin    Alpha-1-Antitrypsin        Thank you for allowing me to participate in the care of CAROLINE MejiaC    I spent a total of 30 minutes on the day of the visit.This includes face to face time and non-face to face time preparing to see the patient (eg, review of tests), obtaining and/or reviewing separately obtained history, documenting clinical information in the electronic or other health record, independently interpreting results and communicating results to the patient/family/caregiver, and coordinating care.         CC'ed note to:   Tarik Sandoval MD

## 2024-10-03 NOTE — PATIENT INSTRUCTIONS
1. Fibroscan to look for fat or scar tissue in the liver with return to clinic   2.   Follow up based on fibroscan    These things are important to improve fatty liver:  Limit alcohol consumption, no more than 1 serving(s) of alcohol in any day (1 serving is 5 ounces of wine, 12 ounces of beer, or 1.5 ounces of liquor) and do NOT recommend any daily alcohol use    Weight loss goal of 25 lbs. [facility] offers benefits to patients so let me know if you want a referral to the [facility] gym. Also, [facility] has dieticians that can create a weight loss plan. If you are interested let me know and I can place a referral. If so, contact the dietician team at [facility] at email [email or web address] or call [phone]  to get scheduled. They do offer video visits   Avoid processed foods. No fried/fast foods. No sugary drinks or drinks with any calories.    Low carb/sugar and high protein diet (100 grams per day of protein).Try to limit your carb intake to LESS than  grams per day total. Use Coeurative Pal or Lose It marielena to add up your carbs through the day. Do NOT drink any beverages with calories or carbs (this can lead to high blood sugar and weight gain). The main thing to focus on is high protein, low carb)  Exercise, 5 days per week, 30 minutes per day, as tolerated  Recommend good cholesterol, blood pressure, blood sugar levels   There is a new medication called Rezdiffra for the treatment of F2-F3 liver scarring due to fatty liver. It is only indicated for patients with significant scar tissue from fatty liver (will reassess after fibroscan)    In some people, fatty liver can progress to steatohepatitis (inflamatory fatty liver) and possibly to cirrhosis, putting one at increased risk for liver cancer, liver failure, and death. Therefore, the lifestyle changes are very important to decrease this risk.     Helpful website for JOEL/fatty liver:  [email or web address]

## 2024-10-06 ENCOUNTER — OFFICE VISIT (OUTPATIENT)
Dept: URGENT CARE | Facility: CLINIC | Age: 64
End: 2024-10-06
Payer: COMMERCIAL

## 2024-10-06 VITALS
SYSTOLIC BLOOD PRESSURE: 136 MMHG | HEIGHT: 65 IN | OXYGEN SATURATION: 96 % | HEART RATE: 66 BPM | DIASTOLIC BLOOD PRESSURE: 82 MMHG | RESPIRATION RATE: 18 BRPM | WEIGHT: 218 LBS | TEMPERATURE: 98 F | BODY MASS INDEX: 36.32 KG/M2

## 2024-10-06 DIAGNOSIS — J98.8 VIRAL RESPIRATORY INFECTION: Primary | ICD-10-CM

## 2024-10-06 DIAGNOSIS — B97.89 VIRAL RESPIRATORY INFECTION: Primary | ICD-10-CM

## 2024-10-06 LAB
CTP QC/QA: YES
CTP QC/QA: YES
POC MOLECULAR INFLUENZA A AGN: NEGATIVE
POC MOLECULAR INFLUENZA B AGN: NEGATIVE
SARS-COV-2 AG RESP QL IA.RAPID: NEGATIVE

## 2024-10-06 PROCEDURE — 87811 SARS-COV-2 COVID19 W/OPTIC: CPT | Mod: QW,S$GLB,, | Performed by: NURSE PRACTITIONER

## 2024-10-06 PROCEDURE — 87502 INFLUENZA DNA AMP PROBE: CPT | Mod: QW,S$GLB,, | Performed by: NURSE PRACTITIONER

## 2024-10-06 PROCEDURE — 99213 OFFICE O/P EST LOW 20 MIN: CPT | Mod: S$GLB,,, | Performed by: NURSE PRACTITIONER

## 2024-10-06 RX ORDER — BENZONATATE 200 MG/1
200 CAPSULE ORAL EVERY 8 HOURS PRN
Qty: 30 CAPSULE | Refills: 0 | Status: SHIPPED | OUTPATIENT
Start: 2024-10-06

## 2024-10-06 NOTE — PATIENT INSTRUCTIONS
Oral fluids  Rest  Steam (hot showers, hot tea)  Blow nose often  Avoid circulating air (such as ceiling fans) dries your airway  Avoid drinking cold drinks (worsens cough)  Avoid strong smells which could worsen cough (perfume, lotions, smoke...)  You can also try a humidifier  Therapeutic coughing to expel mucous  Sit in upright position often   Tylenol for pain relief

## 2024-10-06 NOTE — PROGRESS NOTES
"Subjective:      Patient ID: Junior Beatty is a 64 y.o. male.    Vitals:  height is 5' 5" (1.651 m) and weight is 98.9 kg (218 lb). His oral temperature is 98.4 °F (36.9 °C). His blood pressure is 136/82 and his pulse is 66. His respiration is 18 and oxygen saturation is 96%.     Chief Complaint: Cough    This is a 64 y.o. male who presents today with a chief complaint of elevated temp (100.3), cough, chest congestion, and burning of the eyes x3-4 days. Pt is taking mucinex and tylenol. 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     Cough  This is a new problem. The current episode started in the past 7 days. The problem has been unchanged. The problem occurs constantly. The cough is Non-productive. Associated symptoms include chills, a fever, postnasal drip, a rash and rhinorrhea. Pertinent negatives include no chest pain, ear congestion, ear pain, headaches, heartburn, hemoptysis, myalgias, nasal congestion, sore throat, shortness of breath, sweats, weight loss or wheezing. Nothing aggravates the symptoms. He has tried OTC cough suppressant for the symptoms. The treatment provided mild relief. There is no history of asthma, bronchiectasis, bronchitis, COPD, emphysema, environmental allergies or pneumonia.       Constitution: Positive for chills, fatigue and fever.   HENT:  Positive for congestion, postnasal drip and sinus pressure. Negative for ear pain and sore throat.    Cardiovascular:  Negative for chest pain.   Respiratory:  Positive for cough. Negative for bloody sputum, shortness of breath and wheezing.    Gastrointestinal:  Negative for vomiting, diarrhea and heartburn.   Musculoskeletal:  Negative for muscle ache.   Skin:  Positive for rash.   Allergic/Immunologic: Negative for environmental allergies.   Neurological:  Negative for headaches.      Objective:     Physical Exam   Constitutional: He is oriented to person, place, and time. He appears well-developed. He is cooperative.  Non-toxic " appearance. He does not appear ill. No distress.   HENT:   Head: Normocephalic.   Ears:   Right Ear: Hearing, tympanic membrane, external ear and ear canal normal.   Left Ear: Hearing, tympanic membrane, external ear and ear canal normal.   Nose: Congestion present. No mucosal edema, rhinorrhea or nasal deformity. No epistaxis. Right sinus exhibits no maxillary sinus tenderness and no frontal sinus tenderness. Left sinus exhibits no maxillary sinus tenderness and no frontal sinus tenderness.   Mouth/Throat: Uvula is midline and mucous membranes are normal. Mucous membranes are moist. No trismus in the jaw. Normal dentition. No uvula swelling. Posterior oropharyngeal erythema present. No oropharyngeal exudate or posterior oropharyngeal edema. Oropharynx is clear.   Eyes: Conjunctivae and lids are normal. No scleral icterus.   Neck: Trachea normal and phonation normal. Neck supple. No edema present. No erythema present. No neck rigidity present.   Cardiovascular: Normal rate and regular rhythm.   Pulmonary/Chest: Effort normal and breath sounds normal. No respiratory distress. He has no decreased breath sounds. He has no wheezes. He has no rhonchi.   Abdominal: Normal appearance.   Musculoskeletal: Normal range of motion.         General: No deformity. Normal range of motion.   Neurological: He is alert and oriented to person, place, and time. He exhibits normal muscle tone. Coordination normal.   Skin: Skin is warm, dry, intact, not diaphoretic and not pale.   Psychiatric: His speech is normal and behavior is normal. Judgment and thought content normal.   Nursing note and vitals reviewed.    Results for orders placed or performed in visit on 10/06/24   SARS Coronavirus 2 Antigen, POCT Manual Read    Collection Time: 10/06/24 12:22 PM   Result Value Ref Range    SARS Coronavirus 2 Antigen Negative Negative     Acceptable Yes    POCT Influenza A/B MOLECULAR    Collection Time: 10/06/24 12:43 PM   Result  Value Ref Range    POC Molecular Influenza A Ag Negative Negative    POC Molecular Influenza B Ag Negative Negative     Acceptable Yes         Assessment:     1. Viral respiratory infection        Plan:       Viral respiratory infection  -     SARS Coronavirus 2 Antigen, POCT Manual Read  -     POCT Influenza A/B MOLECULAR  -     benzonatate (TESSALON) 200 MG capsule; Take 1 capsule (200 mg total) by mouth every 8 (eight) hours as needed for Cough.  Dispense: 30 capsule; Refill: 0      Patient Instructions   Oral fluids  Rest  Steam (hot showers, hot tea)  Blow nose often  Avoid circulating air (such as ceiling fans) dries your airway  Avoid drinking cold drinks (worsens cough)  Avoid strong smells which could worsen cough (perfume, lotions, smoke...)  You can also try a humidifier  Therapeutic coughing to expel mucous  Sit in upright position often   Tylenol for pain relief

## 2024-10-08 ENCOUNTER — HOSPITAL ENCOUNTER (OUTPATIENT)
Dept: RADIOLOGY | Facility: HOSPITAL | Age: 64
Discharge: HOME | End: 2024-10-08
Attending: INTERNAL MEDICINE
Payer: COMMERCIAL

## 2024-10-08 DIAGNOSIS — R91.8 LUNG NODULE, MULTIPLE: ICD-10-CM

## 2024-10-08 PROCEDURE — 71250 CT THORAX DX C-: CPT | Mod: TC

## 2024-10-08 PROCEDURE — 71250 CT THORAX DX C-: CPT | Mod: 26,,, | Performed by: RADIOLOGY

## 2024-10-11 ENCOUNTER — TELEPHONE (OUTPATIENT)
Dept: HEPATOLOGY | Facility: CLINIC | Age: 64
End: 2024-10-11

## 2024-10-11 ENCOUNTER — PATIENT MESSAGE (OUTPATIENT)
Dept: HEPATOLOGY | Facility: CLINIC | Age: 64
End: 2024-10-11

## 2024-10-11 ENCOUNTER — PROCEDURE VISIT (OUTPATIENT)
Dept: HEPATOLOGY | Facility: CLINIC | Age: 64
End: 2024-10-11
Payer: COMMERCIAL

## 2024-10-11 ENCOUNTER — LAB VISIT (OUTPATIENT)
Dept: LAB | Facility: HOSPITAL | Age: 64
End: 2024-10-11
Payer: COMMERCIAL

## 2024-10-11 DIAGNOSIS — K76.0 METABOLIC DYSFUNCTION-ASSOCIATED STEATOTIC LIVER DISEASE (MASLD): ICD-10-CM

## 2024-10-11 DIAGNOSIS — R79.89 ELEVATED FERRITIN: ICD-10-CM

## 2024-10-11 DIAGNOSIS — E66.01 CLASS 2 SEVERE OBESITY DUE TO EXCESS CALORIES WITH SERIOUS COMORBIDITY AND BODY MASS INDEX (BMI) OF 36.0 TO 36.9 IN ADULT: Primary | ICD-10-CM

## 2024-10-11 DIAGNOSIS — E66.812 CLASS 2 SEVERE OBESITY DUE TO EXCESS CALORIES WITH SERIOUS COMORBIDITY AND BODY MASS INDEX (BMI) OF 36.0 TO 36.9 IN ADULT: Primary | ICD-10-CM

## 2024-10-11 DIAGNOSIS — K74.01 HEPATIC FIBROSIS, EARLY FIBROSIS: ICD-10-CM

## 2024-10-11 LAB
A1AT SERPL-MCNC: 148 MG/DL (ref 100–190)
ALBUMIN SERPL BCP-MCNC: 3.6 G/DL (ref 3.5–5.2)
ALP SERPL-CCNC: 79 U/L (ref 55–135)
ALT SERPL W/O P-5'-P-CCNC: 40 U/L (ref 10–44)
AST SERPL-CCNC: 30 U/L (ref 10–40)
BILIRUB DIRECT SERPL-MCNC: 0.2 MG/DL (ref 0.1–0.3)
BILIRUB SERPL-MCNC: 0.6 MG/DL (ref 0.1–1)
CERULOPLASMIN SERPL-MCNC: 26 MG/DL (ref 15–45)
FERRITIN SERPL-MCNC: 593 NG/ML (ref 20–300)
HAV IGG SER QL IA: NORMAL
HBV CORE AB SERPL QL IA: REACTIVE
HBV SURFACE AB SER-ACNC: >1000 MIU/ML
HBV SURFACE AB SER-ACNC: REACTIVE M[IU]/ML
HBV SURFACE AG SERPL QL IA: NORMAL
HCV AB SERPL QL IA: NORMAL
IRON SERPL-MCNC: 242 UG/DL (ref 45–160)
PROT SERPL-MCNC: 6.7 G/DL (ref 6–8.4)
SATURATED IRON: 84 % (ref 20–50)
TOTAL IRON BINDING CAPACITY: 287 UG/DL (ref 250–450)
TRANSFERRIN SERPL-MCNC: 194 MG/DL (ref 200–375)

## 2024-10-11 PROCEDURE — 82103 ALPHA-1-ANTITRYPSIN TOTAL: CPT | Performed by: NURSE PRACTITIONER

## 2024-10-11 PROCEDURE — 86803 HEPATITIS C AB TEST: CPT | Performed by: NURSE PRACTITIONER

## 2024-10-11 PROCEDURE — 83540 ASSAY OF IRON: CPT | Performed by: NURSE PRACTITIONER

## 2024-10-11 PROCEDURE — 82390 ASSAY OF CERULOPLASMIN: CPT | Performed by: NURSE PRACTITIONER

## 2024-10-11 PROCEDURE — 87340 HEPATITIS B SURFACE AG IA: CPT | Performed by: NURSE PRACTITIONER

## 2024-10-11 PROCEDURE — 80076 HEPATIC FUNCTION PANEL: CPT | Performed by: NURSE PRACTITIONER

## 2024-10-11 PROCEDURE — 82728 ASSAY OF FERRITIN: CPT | Performed by: NURSE PRACTITIONER

## 2024-10-11 PROCEDURE — 86706 HEP B SURFACE ANTIBODY: CPT | Performed by: NURSE PRACTITIONER

## 2024-10-11 PROCEDURE — 36415 COLL VENOUS BLD VENIPUNCTURE: CPT | Performed by: NURSE PRACTITIONER

## 2024-10-11 PROCEDURE — 86704 HEP B CORE ANTIBODY TOTAL: CPT | Performed by: NURSE PRACTITIONER

## 2024-10-11 PROCEDURE — 86790 VIRUS ANTIBODY NOS: CPT | Performed by: NURSE PRACTITIONER

## 2024-10-11 NOTE — PROCEDURES
FibroScan Transplant Hepatology(Vibration Controlled Transient Elastography)    Date/Time: 10/11/2024 9:30 AM    Performed by: Kary Webster NP  Authorized by: Kary Webster, TATIANNA    Diagnosis:  NAFLD    Probe:  XL    Universal Protocol: Patient's identity, procedure and site were verified, confirmatory pause was performed.  Discussed procedure including risks and potential complications.  Questions answered.  Patient verbalizes understanding and wishes to proceed with VCTE.     Procedure: After providing explanations of the procedure, patient was placed in the supine position with right arm in maximum abduction to allow optimal exposure of right lateral abdomen.  Patient was briefly assessed, Testing was performed in the mid-axillary location, 50Hz Shear Wave pulses were applied and the resulting Shear Wave and Propagation Speed detected with a 3.5 MHz ultrasonic signal, using the FibroScan probe, Skin to liver capsule distance and liver parenchyma were accessed during the entire examination with the FibroScan probe, Patient was instructed to breathe normally and to abstain from sudden movements during the procedure, allowing for random measurements of liver stiffness. At least 10 Shear Waves were produced, Individual measurements of each Shear Wave were calculated.  Patient tolerated the procedure well with no complications.  Meets discharge criteria as was dismissed.  Rates pain 0 out of 10.  Patient will follow up with ordering provider to review results.    Findings  Median liver stiffness score:  7.5  CAP Reading: dB/m:  289    IQR/med %:  16  Interpretation  Fibrosis interpretation is based on medial liver stiffness - Kilopascal (kPa).    Fibrosis Stage:  F2  Steatosis interpretation is based on controlled attenuation parameter - (dB/m).    Steatosis Grade:  S3

## 2024-10-11 NOTE — TELEPHONE ENCOUNTER
Patient was contacted to schedule Fibroscan, labs and F/U within 6 mos.  No answer, an voicemail was left with the call back .        ----- Message from Kary Webster NP sent at 10/11/2024 10:02 AM CDT -----  Results of fibroscan [facility] in [facility]. Please contact pt to schedule f/u with me in 6 months with lab and fibroscan before    Thanks !

## 2024-10-14 ENCOUNTER — TELEPHONE (OUTPATIENT)
Dept: HEPATOLOGY | Facility: CLINIC | Age: 64
End: 2024-10-14
Payer: COMMERCIAL

## 2024-10-14 NOTE — TELEPHONE ENCOUNTER
Patient was contacted to schedule a F/U, Fibroscan and labs in 6 months.  No answer, an voicemail was left with the call back .  A non contact letter have been mailed out.          ----- Message from Kary Webster NP sent at 10/11/2024 10:02 AM CDT -----  Results of fibroscan sent [facility] in [facility]. Please contact pt to schedule f/u with me in 6 months with lab and fibroscan before    Thanks !

## 2024-10-14 NOTE — TELEPHONE ENCOUNTER
Patient returned called to scheduled F/U, Fibroscan and labs.  An appointment have been scheduled on Wednesday, April 16, 2025 at 7:00AN (labs) and Wednesday, April 23, 2025 at 9:30AM (Fibroscan & F/U).  Patient confirmed.

## 2024-10-17 ENCOUNTER — TELEPHONE (OUTPATIENT)
Dept: HEPATOLOGY | Facility: CLINIC | Age: 64
End: 2024-10-17
Payer: COMMERCIAL

## 2024-10-17 NOTE — TELEPHONE ENCOUNTER
Patient called to canceled 1/17/2025 per provider order. No answer, leave message with call back # [phone].       He had an appt for labs, fibroscan, and FU 4/2025.

## 2024-10-31 NOTE — OR NURSING
Report given.   Consent: The patient's consent was obtained including but not limited to risks of crusting, scabbing, blistering, scarring, darker or lighter pigmentary change, recurrence, incomplete removal and infection. Show Topical Anesthesia Variable?: Yes Number Of Freeze-Thaw Cycles: 3 freeze-thaw cycles Render Note In Bullet Format When Appropriate: No Medical Necessity Clause: This procedure was medically necessary because the lesions that were treated were: Medical Necessity Information: It is in your best interest to select a reason for this procedure from the list below. All of these items fulfill various CMS LCD requirements except the new and changing color options. Duration Of Freeze Thaw-Cycle (Seconds): 5-10 Post-Care Instructions: I reviewed with the patient in detail post-care instructions. Patient is to wear sunprotection, and avoid picking at any of the treated lesions. Pt may apply Vaseline to crusted or scabbing areas. Detail Level: Detailed Spray Paint Text: The liquid nitrogen was applied to the skin utilizing a spray paint frosting technique. Application Tool (Optional): Liquid Nitrogen Sprayer Duration Of Freeze Thaw-Cycle (Seconds): 5

## 2024-12-06 ENCOUNTER — ATHLETIC TRAINING SESSION (OUTPATIENT)
Dept: SPORTS MEDICINE | Facility: CLINIC | Age: 64
End: 2024-12-06
Payer: COMMERCIAL

## 2024-12-06 DIAGNOSIS — Z00.00 HEALTH CARE MAINTENANCE: Primary | ICD-10-CM

## 2024-12-07 NOTE — PROGRESS NOTES
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 male student at [employer]) who had concerns including Pain of the Left Shoulder, Pain of the Right Shoulder, and Health Maintenance.    Handedness: right-handed  Sport played: fishing      Level:            Pain        ROS              Objective:       General: Junior is well-developed, well-nourished, appears stated age, in no acute distress, alert and oriented to time, place and person.     AT Session          Assessment:     Status: AT - Cleared to Exert    Date Seen:  12/06/2024    Date of Injury:     Date Out:     Date Cleared:         Treatment/Rehab/Maintenance:           Plan:       1. Thera gun Massage  2. Physician Referral: no  3. ED Referral:no  4. Parent/Guardian Notified: No  5. All questions were answered, ath. will contact me for questions or concerns in  the interim.  6.         Eligible to use School Insurance: No, not a school related injury

## 2024-12-20 ENCOUNTER — ATHLETIC TRAINING SESSION (OUTPATIENT)
Dept: SPORTS MEDICINE | Facility: CLINIC | Age: 64
End: 2024-12-20
Payer: COMMERCIAL

## 2024-12-20 DIAGNOSIS — Z00.00 HEALTH CARE MAINTENANCE: Primary | ICD-10-CM

## 2024-12-20 NOTE — PROGRESS NOTES
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 male student at [employer]) who had concerns including Pain of the Left Shoulder, Pain of the Right Shoulder, and Health Maintenance.    Handedness: right-handed  Sport played: bicycling      Level: becky high            Pain        ROS              Objective:       General: Junior is well-developed, well-nourished, appears stated age, in no acute distress, alert and oriented to time, place and person.     AT Session          Assessment:     Status: AT - Cleared to Exert    Date Seen:  12/20/2024    Date of Injury:     Date Out:     Date Cleared:         Treatment/Rehab/Maintenance:           Plan:       1. Thera Gun Massage  2. Physician Referral: no  3. ED Referral:no  4. Parent/Guardian Notified: No  5. All questions were answered, ath. will contact me for questions or concerns in  the interim.  6.         Eligible to use School Insurance: No, not a school related injury

## 2025-01-11 ENCOUNTER — ATHLETIC TRAINING SESSION (OUTPATIENT)
Dept: SPORTS MEDICINE | Facility: CLINIC | Age: 65
End: 2025-01-11
Payer: COMMERCIAL

## 2025-01-11 DIAGNOSIS — Z00.00 HEALTH CARE MAINTENANCE: Primary | ICD-10-CM

## 2025-01-12 NOTE — PROGRESS NOTES
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 male student at [employer]) who had concerns including Pain of the Left Shoulder, Pain of the Right Shoulder, and Health Maintenance.    Handedness: right-handed  Sport played: football      Level: becky high            Pain        ROS              Objective:       General: Junior is well-developed, well-nourished, appears stated age, in no acute distress, alert and oriented to time, place and person.     AT Session          Assessment:     Status: AT - Cleared to Exert    Date Seen:  01/10/2025    Date of Injury:  N/A    Date Out:  N/A    Date Cleared:  N/A        Treatment/Rehab/Maintenance:     Thera Gun Massage      Plan:       1. Thera Gun Massage  2. Physician Referral: no  3. ED Referral:no  4. Parent/Guardian Notified: No  5. All questions were answered, ath. will contact me for questions or concerns in  the interim.  6.         Eligible to use School Insurance: No, not a school related injury

## 2025-02-24 RX ORDER — HYDROCHLOROTHIAZIDE 12.5 MG/1
12.5 TABLET ORAL
Qty: 90 TABLET | Refills: 0 | Status: SHIPPED | OUTPATIENT
Start: 2025-02-24

## 2025-02-24 NOTE — TELEPHONE ENCOUNTER
Provider Staff:  Action required for this patient    Requires labs      Please see care gap opportunities below in Care Due Message.    Thanks!  [facility]     Appointments      Date Provider   Last Visit   1/26/2024 Tarik Sandoval MD   Next Visit   3/3/2025 Tarik Sandoval MD     Refill Decision Note   Junior Beatty  is requesting a refill authorization.  Brief Assessment and Rationale for Refill:  Approve     Medication Therapy Plan: FOVS; Reviewed acute care/admission visit notes; No follow up with PCP recommended by acute care provider; Approved per protocol      Extended chart review required: Yes   Comments:     Note composed:2:54 PM 02/24/2025

## 2025-02-24 NOTE — TELEPHONE ENCOUNTER
Care Due:                  Date            Visit Type   Department     Provider  --------------------------------------------------------------------------------                                EP -                              PRIMARY      [facility] INTERNAL  Last Visit: 01-      CARE ([facility])   HARVEY Sandoval                              MYCHART                              ANNUAL                              CHECKUP/PHY  [facility] INTERNAL  Next Visit: 03-      S            HARVEY Sandoval                                                            Last  Test          Frequency    Reason                     Performed    Due Date  --------------------------------------------------------------------------------    CBC.........  12 months..  meloxicam................  02- 01-    CMP.........  12 months..  hydroCHLOROthiazide,       02- 03-                             losartan, meloxicam,                             trandolapriL.............    Lipid Panel.  12 months..  atorvastatin.............  03- 03-    [facility] Embedded Care Due Messages. Reference number: [id number].   2/24/2025 1:18:45 AM CST

## 2025-03-02 NOTE — PROGRESS NOTES
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, knees, and elbows)  Osteoarthritis  COVID-19 infection December 2021  Bilateral elbow surgery, left ankle surgery as an infant  Right arthroscopic knee surgery  Bilateral total hip arthroplasty  Left ulnar nerve transposition     Social history  Tobacco use none, alcohol use limited  Exercise by running a recumbent bike    Family history   Mother hypertension  Father cardiovascular disease  Two brothers no health conditions     Screening   Colonoscopy July 2023, hyperplastic polyp      MEDICATIONS  Meloxicam 7.5 mg  Verapamil  mg daily  Aspirin 81 mg daily  Atorvastatin 20 mg daily  Mavik 2 mg daily  Losartan 100 mg daily  Vitamin-D 2000 units daily  Hydrochlorothiazide 12.5 mg daily      Answers submitted by the patient for this visit:  Review of Systems Questionnaire (Submitted on 2/24/2025)  activity change: No  unexpected weight change: No  neck pain: No  hearing loss: No  rhinorrhea: No  trouble swallowing: No  eye discharge: No  visual disturbance: No  chest tightness: No  wheezing: No  chest pain: No  palpitations: No  blood in stool: No  constipation: No  vomiting: No  diarrhea: No  polydipsia: No  polyuria: No  difficulty urinating: No  urgency: No  hematuria: No  joint swelling: No  arthralgias: No  headaches: No  weakness: No  confusion: No  dysphoric mood: No    64-year-old male     Presents for an annual routine visit    He has has been experiencing pain over the lateral aspect of his left upper pelvic region.  He notice this when he bent over last week to  something.  It comes and goes.  He has an upcoming appointment with his orthopedic physician.  If that has not a pain within the inguinal region.    Follows up with hepatology.  Regarding  MASLD    recent scan showed stage F 2.  Right now  trying to control this to diet.  But that has not met with a dietitian.  Has a upcoming appointment in April with hepatology    Review of symptoms.  Reports no chest pain, palpitations, shortness for breath, abdominal pain.  That has regular bowel function.  That has no difficulty urinating.  No indigestion heartburn    Examination  Weight 120 lb   BMI 36.76  Pulse 76  Blood pressure 132/72  HEENT exam, no abnormal findings   Neck no thyromegaly no masses  Chest clear breath sounds good effort  Heart irregular, no murmurs or gallops   Abdominal exam nontender soft no hepatosplenomegaly abdominal masses bruits  Pulses 2+ carotid pulses no bruits 2+ dorsalis pedal pulses   Skin no abnormal findings  Musculoskeletal contraction deformities at the elbows, ankles due to his orthopedic condition  The patient is not tender while palpate over the lateral pelvic bone    Impression   General exam   Hypertension  Hyperlipidemia   Pre diabetes  Paroxysmal atrial fibrillation  Metabolic associated steatosis liver disease  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  Lab studies pending on March 6

## 2025-03-03 ENCOUNTER — OFFICE VISIT (OUTPATIENT)
Dept: INTERNAL MEDICINE | Facility: CLINIC | Age: 65
End: 2025-03-03
Payer: COMMERCIAL

## 2025-03-03 ENCOUNTER — HOSPITAL ENCOUNTER (OUTPATIENT)
Dept: CARDIOLOGY | Facility: CLINIC | Age: 65
Discharge: HOME | End: 2025-03-03
Payer: COMMERCIAL

## 2025-03-03 VITALS
HEART RATE: 78 BPM | HEIGHT: 65 IN | DIASTOLIC BLOOD PRESSURE: 86 MMHG | SYSTOLIC BLOOD PRESSURE: 136 MMHG | WEIGHT: 220.88 LBS | OXYGEN SATURATION: 96 % | BODY MASS INDEX: 36.8 KG/M2

## 2025-03-03 DIAGNOSIS — K76.0 METABOLIC DYSFUNCTION-ASSOCIATED STEATOTIC LIVER DISEASE (MASLD): ICD-10-CM

## 2025-03-03 DIAGNOSIS — I48.91 ATRIAL FIBRILLATION, UNSPECIFIED TYPE: Primary | ICD-10-CM

## 2025-03-03 DIAGNOSIS — I25.10 CORONARY ARTERY CALCIFICATION: ICD-10-CM

## 2025-03-03 DIAGNOSIS — R73.03 PREDIABETES: ICD-10-CM

## 2025-03-03 DIAGNOSIS — I10 ESSENTIAL HYPERTENSION: ICD-10-CM

## 2025-03-03 DIAGNOSIS — I48.0 PAF (PAROXYSMAL ATRIAL FIBRILLATION): ICD-10-CM

## 2025-03-03 DIAGNOSIS — E78.5 HYPERLIPIDEMIA, UNSPECIFIED HYPERLIPIDEMIA TYPE: ICD-10-CM

## 2025-03-03 DIAGNOSIS — Q74.3 ARTHROGRYPOSIS MULTIPLEX CONGENITA: ICD-10-CM

## 2025-03-03 DIAGNOSIS — Z12.5 PROSTATE CANCER SCREENING: ICD-10-CM

## 2025-03-03 DIAGNOSIS — Z00.00 ANNUAL PHYSICAL EXAM: Primary | ICD-10-CM

## 2025-03-03 LAB
OHS QRS DURATION: 108 MS
OHS QTC CALCULATION: 450 MS

## 2025-03-03 PROCEDURE — 93005 ELECTROCARDIOGRAM TRACING: CPT | Mod: S$GLB,,, | Performed by: INTERNAL MEDICINE

## 2025-03-03 PROCEDURE — 99396 PREV VISIT EST AGE 40-64: CPT | Mod: S$GLB,,, | Performed by: INTERNAL MEDICINE

## 2025-03-03 PROCEDURE — 99999 PR PBB SHADOW E&M-EST. PATIENT-LVL V: CPT | Mod: PBBFAC,,, | Performed by: INTERNAL MEDICINE

## 2025-03-03 PROCEDURE — 93010 ELECTROCARDIOGRAM REPORT: CPT | Mod: S$GLB,,, | Performed by: INTERNAL MEDICINE

## 2025-03-05 ENCOUNTER — PATIENT MESSAGE (OUTPATIENT)
Dept: INTERNAL MEDICINE | Facility: CLINIC | Age: 65
End: 2025-03-05
Payer: COMMERCIAL

## 2025-03-06 ENCOUNTER — LAB VISIT (OUTPATIENT)
Dept: LAB | Facility: HOSPITAL | Age: 65
End: 2025-03-06
Attending: INTERNAL MEDICINE
Payer: COMMERCIAL

## 2025-03-06 DIAGNOSIS — I48.0 PAF (PAROXYSMAL ATRIAL FIBRILLATION): ICD-10-CM

## 2025-03-06 DIAGNOSIS — K76.0 METABOLIC DYSFUNCTION-ASSOCIATED STEATOTIC LIVER DISEASE (MASLD): ICD-10-CM

## 2025-03-06 DIAGNOSIS — I25.10 CORONARY ARTERY CALCIFICATION: ICD-10-CM

## 2025-03-06 DIAGNOSIS — I10 ESSENTIAL HYPERTENSION: ICD-10-CM

## 2025-03-06 DIAGNOSIS — Z00.00 ANNUAL PHYSICAL EXAM: ICD-10-CM

## 2025-03-06 DIAGNOSIS — Z12.5 PROSTATE CANCER SCREENING: ICD-10-CM

## 2025-03-06 DIAGNOSIS — Q74.3 ARTHROGRYPOSIS MULTIPLEX CONGENITA: ICD-10-CM

## 2025-03-06 DIAGNOSIS — R73.03 PREDIABETES: ICD-10-CM

## 2025-03-06 DIAGNOSIS — E78.5 HYPERLIPIDEMIA, UNSPECIFIED HYPERLIPIDEMIA TYPE: ICD-10-CM

## 2025-03-06 LAB
ALBUMIN SERPL BCP-MCNC: 3.8 G/DL (ref 3.5–5.2)
ALP SERPL-CCNC: 84 U/L (ref 40–150)
ALT SERPL W/O P-5'-P-CCNC: 42 U/L (ref 10–44)
ANION GAP SERPL CALC-SCNC: 12 MMOL/L (ref 8–16)
AST SERPL-CCNC: 64 U/L (ref 10–40)
BASOPHILS # BLD AUTO: 0.04 K/UL (ref 0–0.2)
BASOPHILS NFR BLD: 0.6 % (ref 0–1.9)
BILIRUB SERPL-MCNC: 0.7 MG/DL (ref 0.1–1)
BUN SERPL-MCNC: 23 MG/DL (ref 8–23)
CALCIUM SERPL-MCNC: 9.1 MG/DL (ref 8.7–10.5)
CHLORIDE SERPL-SCNC: 109 MMOL/L (ref 95–110)
CHOLEST SERPL-MCNC: 120 MG/DL (ref 120–199)
CHOLEST/HDLC SERPL: 3.6 {RATIO} (ref 2–5)
CO2 SERPL-SCNC: 17 MMOL/L (ref 23–29)
COMPLEXED PSA SERPL-MCNC: 0.71 NG/ML (ref 0–4)
CREAT SERPL-MCNC: 0.8 MG/DL (ref 0.5–1.4)
DIFFERENTIAL METHOD BLD: NORMAL
EOSINOPHIL # BLD AUTO: 0.2 K/UL (ref 0–0.5)
EOSINOPHIL NFR BLD: 3.4 % (ref 0–8)
ERYTHROCYTE [DISTWIDTH] IN BLOOD BY AUTOMATED COUNT: 13.7 % (ref 11.5–14.5)
EST. GFR  (NO RACE VARIABLE): >60 ML/MIN/1.73 M^2
ESTIMATED AVG GLUCOSE: 154 MG/DL (ref 68–131)
GLUCOSE SERPL-MCNC: 131 MG/DL (ref 70–110)
HBA1C MFR BLD: 7 % (ref 4–5.6)
HCT VFR BLD AUTO: 52.7 % (ref 40–54)
HDLC SERPL-MCNC: 33 MG/DL (ref 40–75)
HDLC SERPL: 27.5 % (ref 20–50)
HGB BLD-MCNC: 16.9 G/DL (ref 14–18)
IMM GRANULOCYTES # BLD AUTO: 0.03 K/UL (ref 0–0.04)
IMM GRANULOCYTES NFR BLD AUTO: 0.5 % (ref 0–0.5)
LDLC SERPL CALC-MCNC: 63.8 MG/DL (ref 63–159)
LYMPHOCYTES # BLD AUTO: 1.9 K/UL (ref 1–4.8)
LYMPHOCYTES NFR BLD: 29.5 % (ref 18–48)
MCH RBC QN AUTO: 28.1 PG (ref 27–31)
MCHC RBC AUTO-ENTMCNC: 32.1 G/DL (ref 32–36)
MCV RBC AUTO: 88 FL (ref 82–98)
MONOCYTES # BLD AUTO: 0.6 K/UL (ref 0.3–1)
MONOCYTES NFR BLD: 8.9 % (ref 4–15)
NEUTROPHILS # BLD AUTO: 3.7 K/UL (ref 1.8–7.7)
NEUTROPHILS NFR BLD: 57.1 % (ref 38–73)
NONHDLC SERPL-MCNC: 87 MG/DL
NRBC BLD-RTO: 0 /100 WBC
PLATELET # BLD AUTO: 241 K/UL (ref 150–450)
PMV BLD AUTO: 9.5 FL (ref 9.2–12.9)
POTASSIUM SERPL-SCNC: 4.4 MMOL/L (ref 3.5–5.1)
PROT SERPL-MCNC: 7 G/DL (ref 6–8.4)
RBC # BLD AUTO: 6.02 M/UL (ref 4.6–6.2)
SODIUM SERPL-SCNC: 138 MMOL/L (ref 136–145)
T4 FREE SERPL-MCNC: 1.03 NG/DL (ref 0.71–1.51)
TRIGL SERPL-MCNC: 116 MG/DL (ref 30–150)
TSH SERPL DL<=0.005 MIU/L-ACNC: 0.14 UIU/ML (ref 0.4–4)
WBC # BLD AUTO: 6.5 K/UL (ref 3.9–12.7)

## 2025-03-06 PROCEDURE — 84153 ASSAY OF PSA TOTAL: CPT | Performed by: INTERNAL MEDICINE

## 2025-03-06 PROCEDURE — 80061 LIPID PANEL: CPT | Performed by: INTERNAL MEDICINE

## 2025-03-06 PROCEDURE — 80053 COMPREHEN METABOLIC PANEL: CPT | Performed by: INTERNAL MEDICINE

## 2025-03-06 PROCEDURE — 84443 ASSAY THYROID STIM HORMONE: CPT | Performed by: INTERNAL MEDICINE

## 2025-03-06 PROCEDURE — 84439 ASSAY OF FREE THYROXINE: CPT | Performed by: INTERNAL MEDICINE

## 2025-03-06 PROCEDURE — 85025 COMPLETE CBC W/AUTO DIFF WBC: CPT | Performed by: INTERNAL MEDICINE

## 2025-03-06 PROCEDURE — 83036 HEMOGLOBIN GLYCOSYLATED A1C: CPT | Performed by: INTERNAL MEDICINE

## 2025-03-29 ENCOUNTER — ATHLETIC TRAINING SESSION (OUTPATIENT)
Dept: SPORTS MEDICINE | Facility: CLINIC | Age: 65
End: 2025-03-29
Payer: COMMERCIAL

## 2025-03-29 DIAGNOSIS — Z00.00 HEALTH CARE MAINTENANCE: Primary | ICD-10-CM

## 2025-03-30 NOTE — PROGRESS NOTES
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 male student at [employer]) who had concerns including Pain of the Left Shoulder, Pain of the Right Shoulder, and Health Maintenance.    Handedness: right-handed  Sport played: PE Class      Level: becky high            Pain        ROS              Objective:       General: Junior is well-developed, well-nourished, appears stated age, in no acute distress, alert and oriented to time, place and person.     AT Session          Assessment:     Status: AT - Cleared to Exert    Date Seen:  03/28/2024    Date of Injury:  N/A    Date Out:  N/A    Date Cleared:  N/A        Treatment/Rehab/Maintenance:           Plan:       1. Thera Gun Massage  2. Physician Referral: no  3. ED Referral:no  4. Parent/Guardian Notified: No  5. All questions were answered, ath. will contact me for questions or concerns in  the interim.  6.         Eligible to use School Insurance: No, not a school related injury

## 2025-04-09 ENCOUNTER — TELEPHONE (OUTPATIENT)
Dept: HEPATOLOGY | Facility: CLINIC | Age: 65
End: 2025-04-09
Payer: COMMERCIAL

## 2025-04-09 NOTE — TELEPHONE ENCOUNTER
----- Message from Davesoniani sent at 4/9/2025  1:20 PM CDT -----  To: [facility] Hepatology Scheduling Type: General Call Back  Name of Caller:pt Would the patient rather a call back or a response via [facility]? call Best Call Back Number:[phone] Additional Information: Pt would like Fibro scan on June 16 in the morning. Please call pt with further assistance. Thank you.

## 2025-04-11 ENCOUNTER — ATHLETIC TRAINING SESSION (OUTPATIENT)
Dept: SPORTS MEDICINE | Facility: CLINIC | Age: 65
End: 2025-04-11
Payer: COMMERCIAL

## 2025-04-11 DIAGNOSIS — Z00.00 HEALTH CARE MAINTENANCE: Primary | ICD-10-CM

## 2025-04-12 NOTE — PROGRESS NOTES
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 male student at [employer]) who had concerns including Pain of the Left Shoulder, Pain of the Right Shoulder, and Health Maintenance.    Handedness: right-handed  Sport played: bicycling      Level: becky high            Pain        ROS              Objective:       General: Junior is well-developed, well-nourished, appears stated age, in no acute distress, alert and oriented to time, place and person.     AT Session          Assessment:     Status: AT - Cleared to Exert    Date Seen:  04/11/2025    Date of Injury:  N/A    Date Out:  N/A    Date Cleared:  N/A        Treatment/Rehab/Maintenance:           Plan:       1. Thera Gun Massage  2. Physician Referral: no  3. ED Referral:no  4. Parent/Guardian Notified: No  5. All questions were answered, ath. will contact me for questions or concerns in  the interim.  6.         Eligible to use School Insurance: No, not a school related injury

## 2025-05-26 RX ORDER — HYDROCHLOROTHIAZIDE 12.5 MG/1
12.5 TABLET ORAL
Qty: 90 TABLET | Refills: 3 | Status: SHIPPED | OUTPATIENT
Start: 2025-05-26

## 2025-05-26 NOTE — TELEPHONE ENCOUNTER
Refill Decision Note   Junior Rogerio  is requesting a refill authorization.  Brief Assessment and Rationale for Refill:  Approve     Medication Therapy Plan:        Comments:     Note composed:4:22 PM 05/26/2025

## 2025-05-26 NOTE — TELEPHONE ENCOUNTER
No care due was identified.  [facility] Embedded Care Due Messages. Reference number: [id number].   5/26/2025 1:18:15 AM CDT

## 2025-05-30 DIAGNOSIS — I48.0 PAROXYSMAL ATRIAL FIBRILLATION WITH RAPID VENTRICULAR RESPONSE: Primary | Chronic | ICD-10-CM

## 2025-06-16 ENCOUNTER — LAB VISIT (OUTPATIENT)
Dept: LAB | Facility: HOSPITAL | Age: 65
End: 2025-06-16
Attending: FAMILY MEDICINE
Payer: COMMERCIAL

## 2025-06-16 ENCOUNTER — RESULTS FOLLOW-UP (OUTPATIENT)
Dept: HEPATOLOGY | Facility: CLINIC | Age: 65
End: 2025-06-16

## 2025-06-16 ENCOUNTER — PROCEDURE VISIT (OUTPATIENT)
Dept: HEPATOLOGY | Facility: CLINIC | Age: 65
End: 2025-06-16
Payer: COMMERCIAL

## 2025-06-16 DIAGNOSIS — K76.0 METABOLIC DYSFUNCTION-ASSOCIATED STEATOTIC LIVER DISEASE (MASLD): ICD-10-CM

## 2025-06-16 LAB
ALBUMIN SERPL BCP-MCNC: 3.9 G/DL (ref 3.5–5.2)
ALP SERPL-CCNC: 89 UNIT/L (ref 40–150)
ALT SERPL W/O P-5'-P-CCNC: 41 UNIT/L (ref 10–44)
AST SERPL-CCNC: 30 UNIT/L (ref 11–45)
BILIRUB DIRECT SERPL-MCNC: 0.3 MG/DL (ref 0.1–0.3)
BILIRUB SERPL-MCNC: 1 MG/DL (ref 0.1–1)
PROT SERPL-MCNC: 6.7 GM/DL (ref 6–8.4)

## 2025-06-16 PROCEDURE — 91200 LIVER ELASTOGRAPHY: CPT | Mod: S$GLB,,, | Performed by: NURSE PRACTITIONER

## 2025-06-16 PROCEDURE — 36415 COLL VENOUS BLD VENIPUNCTURE: CPT

## 2025-06-16 PROCEDURE — 84075 ASSAY ALKALINE PHOSPHATASE: CPT

## 2025-06-16 NOTE — PROCEDURES
FibroScan Transplant Hepatology(Vibration Controlled Transient Elastography)    Date/Time: 6/16/2025 8:15 AM    Performed by: Kary Webster NP  Authorized by: Kary Webster, TATIANNA    Diagnosis:  NAFLD    Probe:  XL    Universal Protocol: Patient's identity, procedure and site were verified, confirmatory pause was performed.  Discussed procedure including risks and potential complications.  Questions answered.  Patient verbalizes understanding and wishes to proceed with VCTE.     Procedure: After providing explanations of the procedure, patient was placed in the supine position with right arm in maximum abduction to allow optimal exposure of right lateral abdomen.  Patient was briefly assessed, Testing was performed in the mid-axillary location, 50Hz Shear Wave pulses were applied and the resulting Shear Wave and Propagation Speed detected with a 3.5 MHz ultrasonic signal, using the FibroScan probe, Skin to liver capsule distance and liver parenchyma were accessed during the entire examination with the FibroScan probe, Patient was instructed to breathe normally and to abstain from sudden movements during the procedure, allowing for random measurements of liver stiffness. At least 10 Shear Waves were produced, Individual measurements of each Shear Wave were calculated.  Patient tolerated the procedure well with no complications.  Meets discharge criteria as was dismissed.  Rates pain 0 out of 10.  Patient will follow up with ordering provider to review results.    Findings  Median liver stiffness score:  6.9  CAP Reading: dB/m:  316    IQR/med %:  4  Interpretation  Fibrosis interpretation is based on medial liver stiffness - Kilopascal (kPa).    Fibrosis Stage:  F 0-1  Steatosis interpretation is based on controlled attenuation parameter - (dB/m).    Steatosis Grade:  S3

## 2025-06-18 ENCOUNTER — OFFICE VISIT (OUTPATIENT)
Dept: HEPATOLOGY | Facility: CLINIC | Age: 65
End: 2025-06-18
Payer: COMMERCIAL

## 2025-06-18 ENCOUNTER — LAB VISIT (OUTPATIENT)
Dept: LAB | Facility: HOSPITAL | Age: 65
End: 2025-06-18
Payer: COMMERCIAL

## 2025-06-18 ENCOUNTER — PATIENT MESSAGE (OUTPATIENT)
Dept: HEPATOLOGY | Facility: CLINIC | Age: 65
End: 2025-06-18

## 2025-06-18 VITALS — HEIGHT: 65 IN | WEIGHT: 216.5 LBS | BODY MASS INDEX: 36.07 KG/M2

## 2025-06-18 DIAGNOSIS — R79.89 ELEVATED FERRITIN: ICD-10-CM

## 2025-06-18 DIAGNOSIS — E78.2 MIXED HYPERLIPIDEMIA: ICD-10-CM

## 2025-06-18 DIAGNOSIS — R76.8 HEPATITIS B CORE ANTIBODY POSITIVE: ICD-10-CM

## 2025-06-18 DIAGNOSIS — I10 ESSENTIAL HYPERTENSION: Chronic | ICD-10-CM

## 2025-06-18 DIAGNOSIS — R73.03 PRE-DIABETES: ICD-10-CM

## 2025-06-18 DIAGNOSIS — E66.812 CLASS 2 SEVERE OBESITY DUE TO EXCESS CALORIES WITH SERIOUS COMORBIDITY AND BODY MASS INDEX (BMI) OF 36.0 TO 36.9 IN ADULT: ICD-10-CM

## 2025-06-18 DIAGNOSIS — E66.01 CLASS 2 SEVERE OBESITY DUE TO EXCESS CALORIES WITH SERIOUS COMORBIDITY AND BODY MASS INDEX (BMI) OF 36.0 TO 36.9 IN ADULT: ICD-10-CM

## 2025-06-18 DIAGNOSIS — K76.0 METABOLIC DYSFUNCTION-ASSOCIATED STEATOTIC LIVER DISEASE (MASLD): Primary | ICD-10-CM

## 2025-06-18 LAB
FERRITIN SERPL-MCNC: 450 NG/ML (ref 20–300)
HBV CORE AB SERPL QL IA: REACTIVE
IRON SATN MFR SERPL: 61 % (ref 20–50)
IRON SERPL-MCNC: 183 UG/DL (ref 45–160)
TIBC SERPL-MCNC: 299 UG/DL (ref 250–450)
TRANSFERRIN SERPL-MCNC: 202 MG/DL (ref 200–375)

## 2025-06-18 PROCEDURE — 84466 ASSAY OF TRANSFERRIN: CPT

## 2025-06-18 PROCEDURE — 99214 OFFICE O/P EST MOD 30 MIN: CPT | Mod: S$GLB,,, | Performed by: NURSE PRACTITIONER

## 2025-06-18 PROCEDURE — 36415 COLL VENOUS BLD VENIPUNCTURE: CPT

## 2025-06-18 PROCEDURE — 82728 ASSAY OF FERRITIN: CPT

## 2025-06-18 PROCEDURE — 87517 HEPATITIS B DNA QUANT: CPT

## 2025-06-18 PROCEDURE — 99999 PR PBB SHADOW E&M-EST. PATIENT-LVL IV: CPT | Mod: PBBFAC,,, | Performed by: NURSE PRACTITIONER

## 2025-06-18 PROCEDURE — 86704 HEP B CORE ANTIBODY TOTAL: CPT

## 2025-06-18 NOTE — PROGRESS NOTES
[facility] VISIT FOLLOW UP NOTE    PCP: Tarik Sandoval MD     CHIEF COMPLAINT: Metabolic associated steatotic liver disease, elevated ferritin       HPI: This is a 64 y.o. patient with PMH noted below, presenting for follow up of above    Previous serologic w/u negative for Hep B and C  Elevated ferritin and iron, needs repeat     Prior serologic workup:   Lab Results   Component Value Date    FERRITIN 593 (H) 10/11/2024    FESATURATED 84 (H) 10/11/2024    CERULOPLSM 26.0 10/11/2024    HEPBSAG Non-reactive 10/11/2024    HEPCAB Non-reactive 10/11/2024     Risk factors for fatty liver include obesity, HTN, HLD, preDM/DM range on recent A1c    Liver fibrosis staging:  -- Fibroscan 10/2024 noted F2, S3 (kPA 7.5, )  -- Fibroscan 6/2025 noted F0, S3 (kPA 6.9, )    Interval HPI: Presents today alone. Fibroscan notes steatosis but no fibrosis, improvement from prior   No SSB  Rare alcohol  Rare fried/fast foods  Exercising 5-6 times per week: 30 minutes and bike riding   Limiting carbs and portions   Doing well with wt loss: Current wt 216 lbs, previously 224 lbs     Labs done 6/2025 show normal transaminase levels       Lab Results   Component Value Date    ALT 41 06/16/2025    AST 30 06/16/2025    ALKPHOS 89 06/16/2025    BILITOT 1.0 06/16/2025    ALBUMIN 3.9 06/16/2025    INR 0.9 12/22/2020     03/06/2025       Abd U/S done 2/2024 showed fatty liver, focal fatty sparing     Denies family history of liver disease .  Denies Alcohol consumption, see below  Social History     Substance and Sexual Activity   Alcohol Use Not Currently       +Immunity to Hep B - needs Hep A vaccine, given instructions          Allergy and medication list reviewed and updated     PMHX:  has a past medical history of Arthritis, Arthrogryposis, Hyperlipidemia, Hypertension, PAF (paroxysmal atrial fibrillation), and Personal history of colonic polyps.    PSHX:  has a past surgical history that includes  "Application of spica cast (1962-63); Ankle surgery (Left, 1964); Elbow surgery (Bilateral, 1965); wrist release (Bilateral, 1965); Fracture surgery (Left, 1966); Tonsillectomy; Knee arthroscopy (Right, 1986); Hemorrhoid surgery (2009); Colonoscopy (2014); Hip Arthroplasty (Right, 5/27/2019); Joint replacement (Left, 1993); Ulnar nerve transposition (Left, 10/23/2020); and Colonoscopy (N/A, 7/21/2023).    FAMILY HISTORY: Updated and reviewed in EPIC    ROS:   GENERAL: Denies fatigue  CARDIOVASCULAR: Denies edema  GI: Denies abdominal pain  SKIN: Denies rash, itching   NEURO: Denies confusion, memory loss, or mood changes    PHYSICAL EXAM:   In no acute distress; alert and oriented to person, place and time  VITALS: Ht 5' 5" (1.651 m)   Wt 98.2 kg (216 lb 7.9 oz)   BMI 36.03 kg/m²   EYES: Sclerae anicteric  GI: Soft, non-tender, non-distended. No ascites.  EXTREMITIES:  No edema.  SKIN: Warm and dry. No jaundice. No telangectasias noted. No palmar erythema.  NEURO:  No asterixis.  PSYCH:  Thought and speech pattern appropriate. Behavior normal      EDUCATION:  See instructions discussed with patient in Instructions section of the After Visit Summary     ASSESSMENT & PLAN:  64 y.o. male with:  1.  Metabolic associated steatotic liver disease   - see HPI  -- Immunity to Hep A and B : see HPI  -- Fibroscan 6/2025 noted F0, S3 - will repeat in 1-2 years based on ferritin w/u   -- Recommendations discussed with patient:  Limit alcohol consumption  2 Weight loss goal of 25 lbs  3. Low carb/sugar, high fiber and protein diet, limit your carb intake to LESS than 30-45 grams of carbs with a meal or LESS than 5-10 grams with any snack   4. Exercise, 5 days per week, 30 minutes per day, as tolerated  5. Recommend good cholesterol, blood pressure, blood sugar levels   6. No indication for Rezdiffra, no fibrosis     2. Elevated ferritin, iron  Will repeat soon. If elevated, will obtain  DNA lab    3. Obesity, HTN, HLD, preDM "   -- Body mass index is 36.03 kg/m².   -- increases risk for fatty liver    4. Hep B core antibody positive  X1, Will repeat  Previous labs suggest prior exposure, no chronic Hep B  Negative sAg, suggests previous exposure but no chronic/active Hep B. At risk for reactivation with any immunosuppression medication, steroids, chemo, etc.         Follow up for pending results of workup. Based on repeat labs   Orders Placed This Encounter   Procedures    Ferritin    Iron and TIBC    Hepatitis B Core Antibody, Total    HEPATITIS B VIRAL DNA, QUANTITATIVE    Ambulatory Referral/Consult to Lifestyle Nutrition        Thank you for allowing me to participate in the care of CAROLINE MejiaC    I spent a total of 30 minutes on the day of the visit.This includes face to face time and non-face to face time preparing to see the patient (eg, review of tests), obtaining and/or reviewing separately obtained history, documenting clinical information in the electronic or other health record, independently interpreting results and communicating results to the patient/family/caregiver, and coordinating care.         CC'ed note to:   Tarik Sandoval MD

## 2025-06-18 NOTE — PATIENT INSTRUCTIONS
1. Your Fibroscan to look for fat or scar tissue in the liver showed ~70% fat in the liver, no scar tissue damage   2. Repeat iron and ferritin labs. If elevated, test for hemochromatosis (iron overload condition)  3. Follow up based on iron and ferritin labs  4. Lilian garzon with [facility] dietician/nutritionist    These things are important to improve Metabolic associated steatotic liver disease :  Limit alcohol consumption  2 Weight loss goal of 20 lbs. ok to use weight loss medications to help with weight loss from a liver standpoint if you don't have any other contraindications   3. [facility] offers benefits to patients so let me know if you want a referral to the [facility] gym. Also, [facility] has dieticians that can create a weight loss plan. If you are interested let me know and I can place a referral. If so, contact the dietician team at [facility] at email [email or web address] or call [phone].  to get scheduled. They do offer video visits   4. Avoid processed foods. No fried/fast foods. No sugary drinks or drinks with any calories.   5. Low carb/sugar and high protein diet ( grams per day of protein).Try to limit your carb intake to LESS than  grams per day total. Use Matternet Pal or Lose It marielena to add up your carbs through the day. Do NOT drink any beverages with calories or carbs (this can lead to high blood sugar and weight gain). The main thing to focus on is high protein, low carb)  6. Exercise, 5 days per week, 30 minutes per day, as tolerated  7. Recommend good cholesterol, blood pressure, blood sugar levels   8. There is a new medication called Rezdiffra for the treatment of F2-F3 liver scarring due to fatty liver. It is only indicated for patients with significant scar tissue from fatty liver (not you currently)    In some people, fatty liver can progress to steatohepatitis (inflamatory fatty liver) and possibly to cirrhosis,  increasing the risk for liver cancer, liver failure, and death. Therefore, the lifestyle changes are very important to decrease this risk.     Helpful website for [facility]/fatty liver: [email or web address]-[email or web address]       HEP A VACCINE  Your labs show that you DO have immunity against Hep B (+ Hep B surface antibody), so no further vaccine needed for Hep B    However, your immunity markers show that you do NOT have immunity against Hepatitis A, so I recommend that you receive the Hepatitis A vaccine. Hep A can be transmitted through food and water and can cause significant liver injury. There was previously a significant Hepatitis A outbreak in [place]. The most common ways to contract Hepatitis A are from eating at restaurants or with groups of people, sharing food/drinks with others, or some food in the grocery store.   The vaccine will protect your liver from the virus.  The vaccine series is 2 vaccines: one now and one 6 months after the 1st one.   You can get the vaccine from any pharmacy but most patients use an [facility] pharmacy. I recommend calling them in a couple of days to see if the vaccine is covered by your insurance and arrange the vaccines if they are covered    If the vaccine is not covered at the pharmacy level with your insurance, you may be able to get it with your PCP or in the infectious disease department at [facility], just let me know if you need to go that route.

## 2025-06-19 LAB — HBV DNA SERPL NAA+PROBE-ACNC: NORMAL [IU]/ML

## 2025-06-20 ENCOUNTER — RESULTS FOLLOW-UP (OUTPATIENT)
Dept: HEPATOLOGY | Facility: CLINIC | Age: 65
End: 2025-06-20

## 2025-06-23 ENCOUNTER — TELEPHONE (OUTPATIENT)
Dept: HEPATOLOGY | Facility: CLINIC | Age: 65
End: 2025-06-23
Payer: COMMERCIAL

## 2025-06-23 NOTE — TELEPHONE ENCOUNTER
----- Message from Kary Webster NP sent at 6/20/2025  4:15 PM CDT -----  Please contact pt to schedule hemochromatosis DNA lab whenever he wishes (he read [employer] message explaining it)  Thanks !  ----- Message -----  From: Lab, Background User  Sent: 6/18/2025   5:31 PM CDT  To: Kary Webster NP

## 2025-06-25 ENCOUNTER — LAB VISIT (OUTPATIENT)
Dept: LAB | Facility: HOSPITAL | Age: 65
End: 2025-06-25
Payer: COMMERCIAL

## 2025-06-25 DIAGNOSIS — R79.89 ELEVATED FERRITIN: ICD-10-CM

## 2025-06-25 PROCEDURE — 81256 HFE GENE: CPT

## 2025-06-25 PROCEDURE — 36415 COLL VENOUS BLD VENIPUNCTURE: CPT

## 2025-07-01 ENCOUNTER — TELEPHONE (OUTPATIENT)
Dept: HEPATOLOGY | Facility: CLINIC | Age: 65
End: 2025-07-01
Payer: COMMERCIAL

## 2025-07-01 ENCOUNTER — PATIENT MESSAGE (OUTPATIENT)
Dept: HEPATOLOGY | Facility: CLINIC | Age: 65
End: 2025-07-01
Payer: COMMERCIAL

## 2025-07-01 PROBLEM — E83.110 HEREDITARY HEMOCHROMATOSIS: Status: ACTIVE | Noted: 2025-07-01

## 2025-07-01 LAB
GENETIC VARIANT DETAILS BLD/T: NORMAL
GENETICIST REVIEW: NORMAL
LAB TEST METHOD: NORMAL
MOL DX INTERP BLD/T QL: NORMAL
PROVIDER SIGNING NAME: NORMAL
SPECIMEN SOURCE: NORMAL

## 2025-07-01 NOTE — TELEPHONE ENCOUNTER
I spoke with patient.  MRI questions reviewed and answered per patient's responses before scheduling test.  Patient does have artificial hips.

## 2025-07-01 NOTE — TELEPHONE ENCOUNTER
----- Message from Kary Webster NP sent at 7/1/2025  2:01 PM CDT -----  [facility] of [facility] with explanation  Referral to hematology, pt given instructions to schedule for hemochromatosis  Please schedule pt for f/u with me in 1 year with labs and MRI elasto 1 week before    Thanks !

## 2025-07-01 NOTE — TELEPHONE ENCOUNTER
I spoke with patient and msg from NP Scroggs relayed.  Testing scheduled 6/16/26 and f/u visit scheduled 6/25/26; appt reminder notices mailed.

## 2025-07-01 NOTE — TELEPHONE ENCOUNTER
----- Message from Kary Webster NP sent at 7/1/2025  2:02 PM CDT -----  Also can you please double check that he doesn't have any pacemaker/defib, metal implants, claustrophobia, etc? For mRI Thanks !

## 2025-07-07 ENCOUNTER — PATIENT MESSAGE (OUTPATIENT)
Dept: HEPATOLOGY | Facility: CLINIC | Age: 65
End: 2025-07-07
Payer: COMMERCIAL

## 2025-07-07 NOTE — TELEPHONE ENCOUNTER
I sent a msg to Maeve in imaging asking that test be done in wide bore mri in 2026 and it was noted on the scheduled appt.

## 2025-07-14 ENCOUNTER — NUTRITION (OUTPATIENT)
Dept: NUTRITION | Facility: CLINIC | Age: 65
End: 2025-07-14
Payer: COMMERCIAL

## 2025-07-14 VITALS — BODY MASS INDEX: 34.95 KG/M2 | WEIGHT: 210 LBS

## 2025-07-14 DIAGNOSIS — K76.0 METABOLIC DYSFUNCTION-ASSOCIATED STEATOTIC LIVER DISEASE (MASLD): ICD-10-CM

## 2025-07-14 DIAGNOSIS — E66.812 CLASS 2 SEVERE OBESITY DUE TO EXCESS CALORIES WITH SERIOUS COMORBIDITY AND BODY MASS INDEX (BMI) OF 36.0 TO 36.9 IN ADULT: ICD-10-CM

## 2025-07-14 DIAGNOSIS — E66.01 CLASS 2 SEVERE OBESITY DUE TO EXCESS CALORIES WITH SERIOUS COMORBIDITY AND BODY MASS INDEX (BMI) OF 36.0 TO 36.9 IN ADULT: ICD-10-CM

## 2025-07-14 DIAGNOSIS — Z71.3 NUTRITIONAL COUNSELING: Primary | ICD-10-CM

## 2025-07-14 PROCEDURE — 97802 MEDICAL NUTRITION INDIV IN: CPT | Mod: S$GLB,,, | Performed by: DIETITIAN, REGISTERED

## 2025-07-14 PROCEDURE — 99999 PR PBB SHADOW E&M-EST. PATIENT-LVL II: CPT | Mod: PBBFAC,,, | Performed by: DIETITIAN, REGISTERED

## 2025-07-14 NOTE — PROGRESS NOTES
Subjective   Patient ID:  Junior Beatty is a 64 y.o. male who presents for evaluation of Atrial Fibrillation      64 yoM here for AF management.     12/2022: He has AF 2019 while rehabing from Hip replacement. He had an adverse reaction to muscle relaxant. He had Covid Dec 2020 with AF/RVR. He converted on his own. He saw Dr Valencia and was in NSR. He had AF/RVR detected by his PCP. He has been on verapamil for HTN. CHADSVASC of 1.     Interval history: Recurrence of AF 3/3/25 with minimal change in symptoms. He remains in AF today. CHADSVASC of 1    Stress echo 12/23:  ·  Post-stress Impression: The study is normal and negative with no echocardiographic evidence of stress induced ischemia.  ·  ECG Conclusion: The ECG portion of the study is abnormal but not diagnostic due to resting ST-T abnormalities.  ·  Post-stress Echo: The left ventricle systolic function is hyperdynamic. Right ventricle systolic function is hyperdynamic.  ·  Stress ECG: There is ST segment depression noted during stress. There is normal blood pressure response with stress.  ·  Baseline ECG: The Baseline ECG reveals sinus rhythm. The baseline ECG has non-specific ST-T wave changes.    Echo 12/23/2020:  · Techncially challenging study. limited view of the valves.  · The left ventricle is normal in size with normal systolic function. The estimated ejection fraction is 65%  · Indeterminate left ventricular diastolic function.  · Normal right ventricular size with normal right ventricular systolic function.  · Mild left atrial enlargement.  · Normal central venous pressure (3 mmHg).    Stress test 12/23:  ·  Post-stress Impression: The study is normal and negative with no echocardiographic evidence of stress induced ischemia.  ·  ECG Conclusion: The ECG portion of the study is abnormal but not diagnostic due to resting ST-T abnormalities.  ·  Post-stress Echo: The left ventricle systolic function is hyperdynamic. Right ventricle systolic function  is hyperdynamic.  ·  Stress ECG: There is ST segment depression noted during stress. There is normal blood pressure response with stress.  ·  Baseline ECG: The Baseline ECG reveals sinus rhythm. The baseline ECG has non-specific ST-T wave changes.    My interpretation of the ECG is:  AF with normal heart rate nl QRS    Past Medical History:  No date: Arthritis  No date: Arthrogryposis  06/18/2025: Hepatitis B core antibody positive      Comment:  Negative sAg, suggests previous exposure but no                chronic/active Hep B. At risk for reactivation with any                immunosuppression medication, steroids, chemo, etc.   No date: Hyperlipidemia  No date: Hypertension  No date: PAF (paroxysmal atrial fibrillation)  No date: Personal history of colonic polyps    Past Surgical History:  1964: ANKLE SURGERY; Left      Comment:  Klever procedure  1962-63: APPLICATION OF SPICA CAST  2014: COLONOSCOPY  7/21/2023: COLONOSCOPY; N/A      Comment:  Procedure: COLONOSCOPY;  Surgeon: Reyna Bear MD;                 Location: [facility] ENDOSCOPY;  Service: Gastroenterology;                 Laterality: N/A;  referral: Dr. Sandoval, minervaep, portal /                hard stick- ERWpre call complete, stated he would have                a ride -CE  1965: ELBOW SURGERY; Bilateral  1966: FRACTURE SURGERY; Left  2009: HEMORRHOID SURGERY  5/27/2019: HIP ARTHROPLASTY; Right      Comment:  Procedure: ARTHROPLASTY, HIP;  Surgeon: Willian Covarrubias MD;  Location: [facility];  Service: Orthopedics;                 Laterality: Right;  [facility]  1993: JOINT REPLACEMENT; Left      Comment:  w/reconstruction 2002 bilateral hips  1986: KNEE ARTHROSCOPY; Right  No date: TONSILLECTOMY  10/23/2020: ULNAR NERVE TRANSPOSITION; Left      Comment:  Procedure: TRANSPOSITION, NERVE, ULNAR;  Surgeon: Claude S. Williams IV, MD;  Location: [facility];  Service:                Orthopedics;  Laterality: Left;  1965: wrist release;  Bilateral    Social History    Socioeconomic History      Marital status: Single    Tobacco Use      Smoking status: Never      Smokeless tobacco: Never    Substance and Sexual Activity      Alcohol use: Not Currently      Drug use: Never    Social History Narrative      ** Merged History Encounter **           Social Drivers of Health  Financial Resource Strain: Low Risk  (2/24/2025)      Overall Financial Resource Strain (CARDIA)          Difficulty of Paying Living Expenses: Not hard at all  Food Insecurity: No Food Insecurity (2/24/2025)      Hunger Vital Sign          Worried About Running Out of Food in the Last Year: Never true          Ran Out of Food in the Last Year: Never true  Transportation Needs: No Transportation Needs (2/24/2025)      PRAPARE - Transportation          Lack of Transportation (Medical): No          Lack of Transportation (Non-Medical): No  Physical Activity: Insufficiently Active (2/24/2025)      Exercise Vital Sign          Days of Exercise per Week: 5 days          Minutes of Exercise per Session: 20 min  Stress: No Stress Concern Present (2/24/2025)      [employer] [place] [employer] - Occupational Stress Questionnaire          Feeling of Stress : Not at all  Housing Stability: Low Risk  (2/24/2025)      Housing Stability Vital Sign          Unable to Pay for Housing in the Last Year: No          Number of Times Moved in the Last Year: 0          Homeless in the Last Year: No    Review of patient's family history indicates:  Problem: Hypertension      Relation: Mother          Name:               Age of Onset: (Not Specified)  Problem: Basal cell carcinoma      Relation: Mother          Name:               Age of Onset: (Not Specified)  Problem: Diabetes      Relation: Father          Name:               Age of Onset: (Not Specified)  Problem: Heart disease      Relation: Father          Name:               Age of Onset: (Not Specified)              Comment: CAD  Problem:  Basal cell carcinoma      Relation: Brother          Name: Sincere              Age of Onset: (Not Specified)  Problem: Heart attack      Relation: Neg Hx          Name:               Age of Onset: (Not Specified)  Problem: Hyperlipidemia      Relation: Neg Hx          Name:               Age of Onset: (Not Specified)      Current Outpatient Medications:  albuterol (PROVENTIL/VENTOLIN HFA) 90 mcg/actuation inhaler, Inhale 2 puffs into the lungs every 6 (six) hours as needed for Wheezing., Disp: 18 g, Rfl: 0  ascorbic acid, vitamin C, (VITAMIN C) 1000 MG tablet, Take 1,000 mg by mouth once daily., Disp: , Rfl:   aspirin 81 MG Chew, Take 81 mg by mouth once daily., Disp: , Rfl:   atorvastatin (LIPITOR) 20 MG tablet, TAKE 1 TABLET DAILY, Disp: 90 tablet, Rfl: 3  b complex vitamins capsule, Take 1 capsule by mouth once daily., Disp: , Rfl:   betamethasone dipropionate (DIPROLENE) 0.05 % ointment, Apply topically 2 (two) times daily., Disp: 45 g, Rfl: 0  calcium-vitamin D tablet 600 mg-200 units, Take 1 tablet by mouth once., Disp: , Rfl:   FLAXSEED ORAL, Take by mouth., Disp: , Rfl:   garlic 1,000 mg Cap, Take by mouth., Disp: , Rfl:   hydroCHLOROthiazide 12.5 MG Tab, TAKE 1 TABLET DAILY, Disp: 90 tablet, Rfl: 3  losartan (COZAAR) 100 MG tablet, TAKE 1 TABLET DAILY, Disp: 90 tablet, Rfl: 3  meloxicam (MOBIC) 7.5 MG tablet, TAKE 1 TABLET DAILY AS NEEDED FOR PAIN, Disp: 90 tablet, Rfl: 3  om 3/E/linol/ala/oleic/gla/lip (OMEGA 3-6-9 ORAL), Take 1,000 Units by mouth., Disp: , Rfl:   potassium 99 mg Tab, Take by mouth once., Disp: , Rfl:   pulse oximeter (PULSE OXIMETER) device, by Apply Externally route 2 (two) times a day. Use twice daily at 8 AM and 3 PM and record the value in MyChart as directed., Disp: 1 each, Rfl: 0  trandolapriL (MAVIK) 2 MG Tab, TAKE 1 TABLET DAILY, Disp: 90 tablet, Rfl: 3  verapamiL (CALAN-SR) 180 MG CR tablet, TAKE 1 TABLET DAILY, Disp: 90 tablet, Rfl: 3  vitamin D (VITAMIN D3) 1000 units Tab, Take  1,000 Units by mouth once daily. Takes 125 mcg, Disp: , Rfl:     Current Facility-Administered Medications:  aspirin tablet 325 mg, 325 mg, Oral, 1 time in Clinic/HOD, Alvarado Flores PA          Review of Systems   Constitutional: Negative.   HENT: Negative.     Eyes: Negative.    Cardiovascular:  Negative for chest pain, dyspnea on exertion, leg swelling, near-syncope, palpitations and syncope.   Respiratory: Negative.  Negative for shortness of breath.    Endocrine: Negative.    Hematologic/Lymphatic: Negative.    Skin: Negative.    Musculoskeletal: Negative.    Gastrointestinal: Negative.    Genitourinary: Negative.    Neurological: Negative.  Negative for dizziness and light-headedness.   Psychiatric/Behavioral: Negative.     Allergic/Immunologic: Negative.           Objective     Physical Exam  Vitals reviewed.   Constitutional:       General: He is not in acute distress.     Appearance: He is well-developed.   HENT:      Head: Normocephalic and atraumatic.   Eyes:      Pupils: Pupils are equal, round, and reactive to light.   Neck:      Thyroid: No thyromegaly.      Vascular: No JVD.   Cardiovascular:      Rate and Rhythm: Normal rate. Rhythm irregular.      Chest Wall: PMI is not displaced.      Heart sounds: Normal heart sounds, S1 normal and S2 normal. No murmur heard.     No friction rub. No gallop.   Pulmonary:      Effort: Pulmonary effort is normal. No respiratory distress.      Breath sounds: Normal breath sounds. No wheezing or rales.   Abdominal:      General: Bowel sounds are normal. There is no distension.      Palpations: Abdomen is soft.      Tenderness: There is no abdominal tenderness. There is no guarding or rebound.   Musculoskeletal:         General: No tenderness. Normal range of motion.      Cervical back: Normal range of motion.   Skin:     General: Skin is warm and dry.      Findings: No erythema or rash.   Neurological:      Mental Status: He is alert and oriented to person, place,  and time.      Cranial Nerves: No cranial nerve deficit.   Psychiatric:         Behavior: Behavior normal.         Thought Content: Thought content normal.         Judgment: Judgment normal.            Assessment and Plan     1. Persistent atrial fibrillation    2. Atrial fibrillation, unspecified type        Plan:  64 yoM with AF progressing from paroxysmal to persistent. Will start eliqus 5 mg bid and arrange ARIN/CV. Extensive discussion regarding risks, benefits, and alternative approaches to the procedure was had with the patient.  The patient voices understanding and all questions have been answered.  The patient would like to proceed as planned.

## 2025-07-14 NOTE — PROGRESS NOTES
"Nutrition Assessment    Visit Type: Insurance initial  Session Time:  1 Hour 30 Minutes  Reason for MNT visit: Pt in for education and nutrition counseling regarding Obesity, fatty liver       Age: 64 y.o.  Wt:   Wt Readings from Last 1 Encounters:   07/14/25 95.3 kg (210 lb)      Ht:   Ht Readings from Last 1 Encounters:   06/18/25 5' 5" (1.651 m)     BMI:   BMI Readings from Last 1 Encounters:   07/14/25 34.95 kg/m²                       Wt Readings from Last 10 Encounters:   07/14/25 95.3 kg (210 lb)   06/18/25 98.2 kg (216 lb 7.9 oz)   03/03/25 100.2 kg (220 lb 14.4 oz)   10/06/24 98.9 kg (218 lb)   10/03/24 99.3 kg (218 lb 14.7 oz)   02/04/24 94.3 kg (208 lb)   01/26/24 100.7 kg (222 lb 0.1 oz)   12/21/23 99.8 kg (220 lb)   12/18/23 101.7 kg (224 lb 3.3 oz)   07/21/23 96.6 kg (213 lb)         Client states:    Asher recently signed up for an marielena called 'Simple'. He logs hydration, meals, gives weight loss goal. It will give suggestions, he paid $30 for 3 months. His goal is 177 lb. He has a glider, and a mini stepper and some other exercise equipment at home. Sharla states since November he has been making changes to his diet and intake. It was seen on fibroscan that he has fatty liver. Asher did a grocery store  yesterday; many frozen steamable. He likes to stay active. 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 November to now, he lost 8 lb. He went to a conference in [place] last week, a meeting up of people who have the same condition that has. This is a conference he's been going to 20 years. He attends sessions and volunteered 12 hours while he was there. Asher tried to make difference food decisions while he was there, when the option for a vegetable instead of a starch or fried item was given, he mainly picked the vegetable. Asher went out to eat some and ate at the hotel/ banquet as well; steak sandwich & brussel sprouts, steamed broccoli and meat.   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 Friday nights they will go out after and eat a steak.     Medical History  Problem List           Resolved    Paroxysmal atrial fibrillation with rapid ventricular response (Chronic)         Impaired mobility and ADLs         Arthrogryposis multiplex congenita (Chronic)         Mixed hyperlipidemia         Osteoarthritis (Chronic)         Presence of right artificial hip joint (Chronic)         Vitamin D deficiency         Essential hypertension (Chronic)         Troponin level elevated         Class 2 severe obesity due to excess calories with serious comorbidity and body mass index (BMI) of 36.0 to 36.9 in adult         Agatston CAC score, <100         Pre-diabetes         Metabolic dysfunction-associated steatotic liver disease (MASLD)         Hepatitis B core antibody positive         Hereditary hemochromatosis            Past Medical History:   Diagnosis Date    Arthritis     Arthrogryposis     Hepatitis B core antibody positive 06/18/2025    Negative sAg, suggests previous exposure but no chronic/active Hep B. At risk for reactivation with any immunosuppression medication, steroids, chemo, etc.         Hyperlipidemia     Hypertension     PAF (paroxysmal atrial fibrillation)     Personal history of colonic polyps        Past Surgical History:   Procedure Laterality Date    ANKLE SURGERY Left 1964    Klever procedure    APPLICATION OF SPICA CAST  1962-63    COLONOSCOPY  2014    COLONOSCOPY N/A 7/21/2023    Procedure: COLONOSCOPY;  Surgeon: Reyna Bear MD;  Location: [facility] ENDOSCOPY;  Service: Gastroenterology;  Laterality: N/A;  referral: emily Marques, portal / hard stick- ERW  pre call complete, stated he would have a ride -CE    ELBOW SURGERY Bilateral 1965    FRACTURE SURGERY Left 1966    HEMORRHOID SURGERY  2009    HIP ARTHROPLASTY Right 5/27/2019    Procedure: ARTHROPLASTY, HIP;  Surgeon: Willian FARIAS  MD Marci;  Location: [facility];  Service: Orthopedics;  Laterality: Right;  OFIRMEV    JOINT REPLACEMENT Left 1993    w/reconstruction 2002 bilateral hips    KNEE ARTHROSCOPY Right 1986    TONSILLECTOMY      ULNAR NERVE TRANSPOSITION Left 10/23/2020    Procedure: TRANSPOSITION, NERVE, ULNAR;  Surgeon: Claude S. Williams IV, MD;  [facility] OR;  Service: Orthopedics;  Laterality: Left;    wrist release Bilateral 1965        Lab Reports:  Reviewed and noted     Lab Results   Component Value Date    [id number] 53 06/08/2022    TSH 0.143 (L) 03/06/2025    FREET4 1.03 03/06/2025    CRP 12.5 (H) 12/23/2020    AST 30 06/16/2025    ALT 41 06/16/2025    HDL 33 (L) 03/06/2025    LDLCALC 63.8 03/06/2025    TRIG 116 03/06/2025    HGBA1C 7.0 (H) 03/06/2025    HGBA1C 6.0 (H) 03/18/2024    HGBA1C 6.5 (H) 12/18/2023      BP Readings from Last 1 Encounters:   03/03/25 136/86        Medications    Prior to Admission medications    Medication Sig Start Date End Date Taking? Authorizing Provider   albuterol (PROVENTIL/VENTOLIN HFA) 90 mcg/actuation inhaler Inhale 2 puffs into the lungs every 6 (six) hours as needed for Wheezing. 1/26/24   Tarik Sandoval MD   ascorbic acid, vitamin C, (VITAMIN C) 1000 MG tablet Take 1,000 mg by mouth once daily.    Provider, Historical   aspirin 81 MG Chew Take 81 mg by mouth once daily. 6/25/19   Provider, Historical   atorvastatin (LIPITOR) 20 MG tablet TAKE 1 TABLET DAILY 7/22/24   Daniel Fall MD   b complex vitamins capsule Take 1 capsule by mouth once daily.    Provider, Historical   betamethasone dipropionate (DIPROLENE) 0.05 % ointment Apply topically 2 (two) times daily. 7/21/22   Tarik Sandoval MD   calcium-vitamin D tablet 600 mg-200 units Take 1 tablet by mouth once.    Provider, Historical   FLAXSEED ORAL Take by mouth.    Provider, Historical   garlic 1,000 mg Cap Take by mouth.    Provider, Historical   hydroCHLOROthiazide 12.5 MG Tab TAKE 1 TABLET DAILY 5/26/25    Tarik Sandoval MD   losartan (COZAAR) 100 MG tablet TAKE 1 TABLET DAILY 7/22/24   Daniel Fall MD   meloxicam (MOBIC) 7.5 MG tablet TAKE 1 TABLET DAILY AS NEEDED FOR PAIN 8/1/24   Tarik Sandoval MD   om 3/E/linol/ala/oleic/gla/lip (OMEGA 3-6-9 ORAL) Take 1,000 Units by mouth.    Provider, Historical   potassium 99 mg Tab Take by mouth once.    Provider, Historical   pulse oximeter (PULSE OXIMETER) device by Apply Externally route 2 (two) times a day. Use twice daily at 8 AM and 3 PM and record the value in MyChart as directed. 12/24/20   Soto Motta MD   trandolapriL (MAVIK) 2 MG Tab TAKE 1 TABLET DAILY 7/22/24   Daniel Fall MD   verapamiL (CALAN-SR) 180 MG CR tablet TAKE 1 TABLET DAILY 7/22/24   Daniel Fall MD   vitamin D (VITAMIN D3) 1000 units Tab Take 1,000 Units by mouth once daily. Takes 125 mcg 6/21/19   Provider, Historical        Vitamins, Minerals, and/or Supplements:  flaxseed, omeg3, calcium, Vit D, B complex, potassium         LIFESTYLE FACTORS      Sleep: good  Schedule will change;      Energy Level: good     Stress Level: low    Support System:  brothers are helpful, a cousin lives close by and his wife is a nurse      Exercise Regimen: Moderately active (moderate intensity, 3-5 days a week)  - 20 minutes: glute presses/bridges, angled weight bench (curls, shrugs, lifts), 61calf raises, resistance bands on his door: 101 chest presses, 61 crunches, 41 oblique twists, 'thigh master' 101 abductors and adductors, 'pedal pusher'  -mini stepper and gazelle     Social History    Marital status:  Single    Social History     Tobacco Use    Smoking status: Never    Smokeless tobacco: Never   Substance Use Topics    Alcohol use: Not Currently     Current Alcohol use: none      Meal preparation/shopping:      grocery  , [employer]    Dinning out: 3-4 x per week  -Saturday out for breakfast  -lunch is offered, cooked by someone for the faculty; [facility] on Tues,  chicken dish thurs, seafood on Fridays  -may do [employer]ati[employer] drive through for breakfast    Food Allergies or Intolerances: none      Food Preferences / Dietary Restrictions: none       24-hour Recall:     Reviewed and noted during consultation   Wake up 5-6 am  B 6 am: oatmeal (walnuts, yael, nutmeg, tumeric), 2 eggs, fruit, coffee w/ creamer, maple extract   [employer]ati[employer] 2 x/ month at most; egg & cheese or sausage cheese on English Muffin, fruit from home    Snack: [employer] granola bar (2)  *smoothie made at work  *higher protein, lower sugar bar      L  12:05-12:45 pm : varies day of week; Monday /Wednesday vary; red beans & rice w/ jalapeno corn bread / club sandwiches w/ chips  Taco: southwest salad / tacos pork or chicken 2 with side like rice, or broccoli salad, like quacomole   Thursday : chicken, fried or baked or grilled, vegetables, maybe have potatoes or rice, jambalya, green beans w/ chopped senior / spinach and mushroom gravy stuff into chicken  Friday: salmon w/ asparagus, gumbo, etoufee (brown rice option), fried cat fish po boy    (Summer break): eats breakfast later, steamable only around 1-2 pm,     Beverages: water, Carmen diet green tea    *Add snack:   - PB on a tortilla  - unsalted nuts and fresh fruit  - plain greek yogurt + fresh or frozen fruit    D 6pm  :  less than lunch size ; packaged deli meat sandwich (ham or turkey breast or salmon or tuna packet) , small pie  Smoothie: frozen fruit (petra/ pineapple, mixed berry, pomegranate, almond milk, greek yogurt, going to try whey protein)  - rarely has potatoes or pasta     Snack : tries to avoid but ilkes Yael [place] Crunch cereal / [employer] pie    *aiming for 14 hour fast; drinking water                                                                           Diagnosis    Food and nutrition related knowledge deficit related to no prior as evidenced by diet recall, discussion.      Intervention    Estimated Energy Requirements:   Calories:  6083-7101  Protein: 140-120 grams  Carbohydrates:  grams  Total Fat:  grams  Baseline for fluids: 95 fl oz    Recommendations & Goals:  Patient goals and recommendations are tailored to the specific patient's needs, readiness to change, lifestyle, culture, skills, resources, & abilities. Strategies to help achieve these nutrition-related goals were discussed which can include but are not limited to SMART goal setting & mindful eating.     Aim for a minimum of 7 hours sleep   Exercise 60 minutes most days  Eat breakfast within 1-2 hours of waking up  Try not to skip any meals or snacks, not going more than 3-4 hours without eating   At each meal and snack, try to include a source of fiber + lean protein + healthy fat     Written Materials Provided  These resources are intended to assist the patient in making it easier to choose recommended options when eating out & to identify better-for-you brands at the grocery store:    Meal Planning Guide with recommendations discussed along with portion sizes and a meal plan   Fueling Well On-the-Go Food Guide  Eat Fit Shopping Guide  Lifestyle Nutrition Meal Guide  RD contact information    Action Items:   - choose more filling, less carbohydrate heavy snacks  - opt for grilled, baked protein items from school lunch  - limit sweet dessert to weekly at most      Monitoring/Evaluation    Monitor the following:  Weight  Sleep  Stress Management  Movement  Nutrient intake in reference to meal plan    Communicated with healthcare provider and documented plan for referral to appropriate agency/healthcare provider as needed    Supervising Physician: Tay Hi MD    Patient motivation, anticipated barriers, expected compliance: Patient is motivated and has verbalized understanding and intent to comply.     Comprehension: good     Follow-up: 6 weeks

## 2025-07-15 ENCOUNTER — HOSPITAL ENCOUNTER (OUTPATIENT)
Dept: CARDIOLOGY | Facility: CLINIC | Age: 65
Discharge: HOME | End: 2025-07-15
Payer: COMMERCIAL

## 2025-07-15 ENCOUNTER — OFFICE VISIT (OUTPATIENT)
Dept: ELECTROPHYSIOLOGY | Facility: CLINIC | Age: 65
End: 2025-07-15
Payer: COMMERCIAL

## 2025-07-15 ENCOUNTER — LAB VISIT (OUTPATIENT)
Dept: LAB | Facility: HOSPITAL | Age: 65
End: 2025-07-15
Attending: INTERNAL MEDICINE
Payer: COMMERCIAL

## 2025-07-15 VITALS
WEIGHT: 214.06 LBS | HEART RATE: 87 BPM | BODY MASS INDEX: 35.67 KG/M2 | SYSTOLIC BLOOD PRESSURE: 122 MMHG | DIASTOLIC BLOOD PRESSURE: 84 MMHG | HEIGHT: 65 IN

## 2025-07-15 DIAGNOSIS — I48.0 PAF (PAROXYSMAL ATRIAL FIBRILLATION): Primary | ICD-10-CM

## 2025-07-15 DIAGNOSIS — I48.0 PAF (PAROXYSMAL ATRIAL FIBRILLATION): ICD-10-CM

## 2025-07-15 DIAGNOSIS — I48.91 ATRIAL FIBRILLATION, UNSPECIFIED TYPE: ICD-10-CM

## 2025-07-15 DIAGNOSIS — I48.19 PERSISTENT ATRIAL FIBRILLATION: Primary | ICD-10-CM

## 2025-07-15 DIAGNOSIS — I48.0 PAROXYSMAL ATRIAL FIBRILLATION WITH RAPID VENTRICULAR RESPONSE: Chronic | ICD-10-CM

## 2025-07-15 LAB
ANION GAP (OHS): 12 MMOL/L (ref 8–16)
APTT PPP: 22.3 SECONDS (ref 21–32)
BUN SERPL-MCNC: 15 MG/DL (ref 8–23)
CALCIUM SERPL-MCNC: 8.9 MG/DL (ref 8.7–10.5)
CHLORIDE SERPL-SCNC: 106 MMOL/L (ref 95–110)
CO2 SERPL-SCNC: 22 MMOL/L (ref 23–29)
CREAT SERPL-MCNC: 0.7 MG/DL (ref 0.5–1.4)
ERYTHROCYTE [DISTWIDTH] IN BLOOD BY AUTOMATED COUNT: 14.3 % (ref 11.5–14.5)
GFR SERPLBLD CREATININE-BSD FMLA CKD-EPI: >60 ML/MIN/1.73/M2
GLUCOSE SERPL-MCNC: 136 MG/DL (ref 70–110)
HCT VFR BLD AUTO: 48.9 % (ref 40–54)
HGB BLD-MCNC: 16.8 GM/DL (ref 14–18)
INR PPP: 1 (ref 0.8–1.2)
MCH RBC QN AUTO: 28.9 PG (ref 27–31)
MCHC RBC AUTO-ENTMCNC: 34.4 G/DL (ref 32–36)
MCV RBC AUTO: 84 FL (ref 82–98)
OHS QRS DURATION: 116 MS
OHS QTC CALCULATION: 457 MS
PLATELET # BLD AUTO: 197 K/UL (ref 150–450)
PMV BLD AUTO: 9.3 FL (ref 9.2–12.9)
POTASSIUM SERPL-SCNC: 3.7 MMOL/L (ref 3.5–5.1)
PROTHROMBIN TIME: 11.4 SECONDS (ref 9–12.5)
RBC # BLD AUTO: 5.82 M/UL (ref 4.6–6.2)
SODIUM SERPL-SCNC: 140 MMOL/L (ref 136–145)
WBC # BLD AUTO: 6.66 K/UL (ref 3.9–12.7)

## 2025-07-15 PROCEDURE — 99214 OFFICE O/P EST MOD 30 MIN: CPT | Mod: S$GLB,,, | Performed by: INTERNAL MEDICINE

## 2025-07-15 PROCEDURE — 82310 ASSAY OF CALCIUM: CPT

## 2025-07-15 PROCEDURE — 85610 PROTHROMBIN TIME: CPT

## 2025-07-15 PROCEDURE — 93000 ELECTROCARDIOGRAM COMPLETE: CPT | Mod: S$GLB,,, | Performed by: INTERNAL MEDICINE

## 2025-07-15 PROCEDURE — 36415 COLL VENOUS BLD VENIPUNCTURE: CPT

## 2025-07-15 PROCEDURE — 85730 THROMBOPLASTIN TIME PARTIAL: CPT

## 2025-07-15 PROCEDURE — 85027 COMPLETE CBC AUTOMATED: CPT

## 2025-07-15 PROCEDURE — 99999 PR PBB SHADOW E&M-EST. PATIENT-LVL IV: CPT | Mod: PBBFAC,,, | Performed by: INTERNAL MEDICINE

## 2025-07-15 NOTE — PATIENT INSTRUCTIONS
Name: Junior Beatty  Date: 07/14/2025    Nutrition protocol    Action Items:  - choose more filling, less carbohydrate heavy snacks  - opt for grilled, baked protein items from school lunch  - limit sweet dessert to weekly at most    Daily Energy Requirements:  Calories: 6142-6693  Protein: 120-140 grams  Carbohydrates:  grams  Total Fat:  grams  Baseline for fluids: 95 fl oz  Limit Added Sugar to no more than 20 grams    Meal Plan Template:     Breakfast: 4 ounces protein (30 grams) + 2 servings (30 grams) of carbohydrates + heart healthy fats + unlimited non-starchy vegetables     Snack: 2 ounces protein (15 grams) + 2 serving (15 grams) of carbohydrates +  heart healthy fats + unlimited non-starchy vegetables      Lunch: 3 ounces protein (20 grams) + 3 servings (45 grams) of carbohydrates + heart healthy fats + unlimited non-starchy vegetables      Snack: 2 ounces protein (15 grams) + 15 serving (1 grams) of carbohydrates +  heart healthy fats + unlimited non-starchy vegetables      Dinner: 6 ounces of protein (40 grams) + 0 servings of carbohydrates (0 grams) + heart healthy fats + unlimited non-starchy vegetables      Typical Day:  Wake up 5-6 am  B 6 am: oatmeal (walnuts, yael, nutmeg, tumeric), 2 eggs, fruit, coffee w/ creamer, maple extract   Tati's 2 x/ month at most; egg & cheese or sausage cheese on English Muffin, fruit from home    Snack: [employer] granola bar (2)  *smoothie made at work  *higher protein, lower sugar bar      L  12:05-12:45 pm : varies day of week; Monday /Wednesday vary; red beans & rice w/ jalapeno corn bread / club sandwiches w/ chips  Taco: southwest salad / tacos pork or chicken 2 with side like rice, or broccoli salad, like quacomole   Thursday : chicken, fried or baked or grilled, vegetables, maybe have potatoes or rice, jambalya, green beans w/ chopped senior / spinach and mushroom gravy stuff into chicken  Friday: salmon w/ asparagus, gumbo, etoufee (brown  rice option), fried cat fish po boy    (Summer break): eats breakfast later, steamable only around 1-2 pm,     Beverages: water, Carmen diet green tea    *Add snack:   - PB on a tortilla  - unsalted nuts and fresh fruit  - plain greek yogurt + fresh or frozen fruit    D 6pm  :  less than lunch size ; packaged deli meat sandwich (ham or turkey breast or salmon or tuna packet) , small pie  Smoothie: frozen fruit (petra/ pineapple, mixed berry, pomegranate, almond milk, greek yogurt, going to try whey protein)  - rarely has potatoes or pasta     Snack : tries to avoid but ilkes [place] Crunch cereal / [employer] pie    *aiming for 14 hour fast; drinking water      Refer to Eat Fit Shopping Guide for approved food & beverage brands    Dining Out   -[facility]   Designed to take the guesswork out of dining out healthfully, [facility] makes the healthy choice the easy choice  Visit [email or web address].[email or web address]   Order the '[employer] Cookbook  Revised, Updated, Expanded' to create restaurant quality dishes at home  Craft: The Eat Fit Guide to Zero Proof Cocktails  Download the [facility] marielena for free on your smartphone  List of all Eat Fit restaurant partners & dishes by location  Nutrition facts included for all Eat Fit dishes  200 + Eat Fit approved recipes  Eat Fit grocery shopping guides + community wellness resources      Restaurant Tips      Pick 1 out of the 4 Rule: Instead of eating bread/tortilla chips, an appetizer, 1 alcoholic drink, and dessert, just choose one to have with your entrée   Focus on lean cuts of protein: Refer to lean meat/meat substitutes page in meal planning guidebook  Select items grilled, baked, broiled, braised, poached, or roasted       For heart health, avoid crispy, crunchy, breaded, paneed or stuffed items as well as items that are cream based, au gratin or buttered       Order sauces, dressings, and gravies on the side  This way you can add 1-2 tablespoons yourself,  which helps portion control      You can request double non-starchy vegetables instead of a starchy side dish  If the starchy side is something you love, consider splitting it with someone else     Beverages: Order water with lemon, sparkling water, or unsweetened tea. Avoid sugary beverages such as soft drinks, juices, lemonade, and mixers   Deep fried foods: If desired, enjoy at most twice a month    Lifestyle Nutrition Department   [phone]  [email or web address]  _______________________________________________________

## 2025-07-23 ENCOUNTER — TELEPHONE (OUTPATIENT)
Dept: ELECTROPHYSIOLOGY | Facility: CLINIC | Age: 65
End: 2025-07-23
Payer: COMMERCIAL

## 2025-07-23 NOTE — TELEPHONE ENCOUNTER
Spoke to patient    CONFIRMED procedure arrival time of 9:00 AM on 7/24/205    Reiterated instructions including:  -Directions to check in desk  -NPO after midnight night prior to procedure  -Patient allowed to drink water up until 2 hours prior to arrival due to IV fluid shortage.   -Confirmed that patient does not take GLP-1 Agonist.  -Confirmed compliance of Eliquis  -Pre-procedure LABS Reviewed.   -Confirmed absence of implanted device/stimulator   -Confirmed no fever, cough, or shortness of breath in the past 30 days  -Confirmed no redness, rash, irritation, or yeast infection to chest area.   -Reviewed current visitor policy    Patient verbalized understanding of above and appreciated the call.

## 2025-07-24 ENCOUNTER — ANESTHESIA EVENT (OUTPATIENT)
Dept: MEDSURG UNIT | Facility: HOSPITAL | Age: 65
End: 2025-07-24
Payer: COMMERCIAL

## 2025-07-24 ENCOUNTER — ANESTHESIA (OUTPATIENT)
Dept: MEDSURG UNIT | Facility: HOSPITAL | Age: 65
End: 2025-07-24
Payer: COMMERCIAL

## 2025-07-24 ENCOUNTER — HOSPITAL ENCOUNTER (OUTPATIENT)
Dept: CARDIOLOGY | Facility: HOSPITAL | Age: 65
Discharge: HOME | End: 2025-07-24
Attending: INTERNAL MEDICINE
Payer: COMMERCIAL

## 2025-07-24 ENCOUNTER — HOSPITAL ENCOUNTER (OUTPATIENT)
Facility: HOSPITAL | Age: 65
Discharge: HOME | End: 2025-07-24
Attending: INTERNAL MEDICINE | Admitting: INTERNAL MEDICINE
Payer: COMMERCIAL

## 2025-07-24 VITALS
DIASTOLIC BLOOD PRESSURE: 64 MMHG | SYSTOLIC BLOOD PRESSURE: 109 MMHG | BODY MASS INDEX: 34.82 KG/M2 | HEIGHT: 65 IN | WEIGHT: 209 LBS

## 2025-07-24 VITALS
DIASTOLIC BLOOD PRESSURE: 60 MMHG | SYSTOLIC BLOOD PRESSURE: 124 MMHG | BODY MASS INDEX: 34.82 KG/M2 | RESPIRATION RATE: 17 BRPM | HEART RATE: 55 BPM | WEIGHT: 209 LBS | OXYGEN SATURATION: 98 % | TEMPERATURE: 98 F | HEIGHT: 65 IN

## 2025-07-24 DIAGNOSIS — I48.91 ATRIAL FIBRILLATION: ICD-10-CM

## 2025-07-24 DIAGNOSIS — I48.0 PAF (PAROXYSMAL ATRIAL FIBRILLATION): ICD-10-CM

## 2025-07-24 LAB
AORTIC SIZE INDEX (SOV): 2.1 CM/M2
AORTIC SIZE INDEX: 1.7 CM/M2
ASCENDING AORTA: 3.5 CM
BSA FOR ECHO PROCEDURE: 2.08 M2
Lab: 2.1 CM/M
Lab: 2.5 CM/M
OHS CV CPX PATIENT HEIGHT IN: 65
OHS QRS DURATION: 118 MS
OHS QRS DURATION: 120 MS
OHS QTC CALCULATION: 428 MS
OHS QTC CALCULATION: 428 MS
SINUS: 4.2 CM
STJ: 3.5 CM

## 2025-07-24 PROCEDURE — 37000009 HC ANESTHESIA EA ADD 15 MINS: Performed by: INTERNAL MEDICINE

## 2025-07-24 PROCEDURE — 93005 ELECTROCARDIOGRAM TRACING: CPT

## 2025-07-24 PROCEDURE — 93320 DOPPLER ECHO COMPLETE: CPT | Mod: 26,,, | Performed by: INTERNAL MEDICINE

## 2025-07-24 PROCEDURE — 37000008 HC ANESTHESIA 1ST 15 MINUTES: Performed by: INTERNAL MEDICINE

## 2025-07-24 PROCEDURE — 93312 ECHO TRANSESOPHAGEAL: CPT | Mod: 26,,, | Performed by: INTERNAL MEDICINE

## 2025-07-24 PROCEDURE — 93010 ELECTROCARDIOGRAM REPORT: CPT | Mod: 76,,, | Performed by: INTERNAL MEDICINE

## 2025-07-24 PROCEDURE — 92960 CARDIOVERSION ELECTRIC EXT: CPT | Mod: ,,, | Performed by: INTERNAL MEDICINE

## 2025-07-24 PROCEDURE — 63600175 PHARM REV CODE 636 W HCPCS

## 2025-07-24 PROCEDURE — 25000003 PHARM REV CODE 250

## 2025-07-24 PROCEDURE — 93325 DOPPLER ECHO COLOR FLOW MAPG: CPT

## 2025-07-24 PROCEDURE — 93325 DOPPLER ECHO COLOR FLOW MAPG: CPT | Mod: 26,,, | Performed by: INTERNAL MEDICINE

## 2025-07-24 PROCEDURE — 92960 CARDIOVERSION ELECTRIC EXT: CPT | Performed by: INTERNAL MEDICINE

## 2025-07-24 PROCEDURE — 93010 ELECTROCARDIOGRAM REPORT: CPT | Mod: ,,, | Performed by: INTERNAL MEDICINE

## 2025-07-24 RX ORDER — LIDOCAINE HYDROCHLORIDE 20 MG/ML
INJECTION INTRAVENOUS
Status: DISCONTINUED | OUTPATIENT
Start: 2025-07-24 | End: 2025-07-24

## 2025-07-24 RX ORDER — GLUCAGON 1 MG
1 KIT INJECTION
Status: DISCONTINUED | OUTPATIENT
Start: 2025-07-24 | End: 2025-07-24 | Stop reason: HOSPADM

## 2025-07-24 RX ORDER — SODIUM CHLORIDE 0.9 % (FLUSH) 0.9 %
3 SYRINGE (ML) INJECTION
Status: DISCONTINUED | OUTPATIENT
Start: 2025-07-24 | End: 2025-07-24 | Stop reason: HOSPADM

## 2025-07-24 RX ORDER — SILVER SULFADIAZINE 10 G/1000G
CREAM TOPICAL DAILY
Status: DISCONTINUED | OUTPATIENT
Start: 2025-07-24 | End: 2025-07-24 | Stop reason: HOSPADM

## 2025-07-24 RX ORDER — PROPOFOL 10 MG/ML
VIAL (ML) INTRAVENOUS
Status: DISCONTINUED | OUTPATIENT
Start: 2025-07-24 | End: 2025-07-24

## 2025-07-24 RX ADMIN — LIDOCAINE HYDROCHLORIDE 50 MG: 20 INJECTION INTRAVENOUS at 11:07

## 2025-07-24 RX ADMIN — SODIUM CHLORIDE: 0.9 INJECTION, SOLUTION INTRAVENOUS at 11:07

## 2025-07-24 RX ADMIN — PROPOFOL 150 MCG/KG/MIN: 10 INJECTION, EMULSION INTRAVENOUS at 11:07

## 2025-07-24 RX ADMIN — PROPOFOL 80 MG: 10 INJECTION, EMULSION INTRAVENOUS at 11:07

## 2025-07-24 NOTE — TRANSFER OF CARE
"Anesthesia Transfer of Care Note    Patient: Junior Beatty    Procedure(s) Performed: Procedure(s) (LRB):  CARDIOVERSION (N/A)  Transesophageal echo (ARIN) intra-procedure log documentation (N/A)    Patient location: Other: [facility]SCU    Anesthesia Type: general    Transport from OR: Transported from OR on 2-3 L/min O2 by NC with adequate spontaneous ventilation    Post pain: adequate analgesia    Post assessment: no apparent anesthetic complications    Post vital signs: stable    Level of consciousness: sedated    Nausea/Vomiting: no nausea/vomiting    Complications: none    Transfer of care protocol was followed      Last vitals: Visit Vitals  /64 (BP Location: Left arm, Patient Position: Sitting)   Pulse (!) 58   Temp 36.4 °C (97.5 °F) (Temporal)   Resp 18   Ht 5' 5" (1.651 m)   Wt 94.8 kg (209 lb)   SpO2 96%   BMI 34.78 kg/m²     "

## 2025-07-24 NOTE — H&P
[facility]  Cardiology  History and Physical     Patient Name: Junior Beatty  MRN: [id number]  Admission Date: 7/24/2025  Code Status: Prior   Attending Provider: Fermin Keenan MD   Primary Care Physician: Tarik Sandoval MD  Principal Problem:<principal problem not specified>    Patient information was obtained from patient and past medical records.     Subjective:     Chief Complaint:  Atrial fibrillation      HPI:  Mr. Beatty is a 64 year old with a PMHx significant for atrial fibrillation and HTN. He has had paroxysmal atrial fibrillation since 2019. Now with persistent AF since 3/3/25 with minimal change in symptoms. He was started on Eliquis. He presents today for ARIN/DCCV.    TTE 12/23/2020  Techncially challenging study. limited view of the valves.  The left ventricle is normal in size with normal systolic function. The estimated ejection fraction is 65%  Indeterminate left ventricular diastolic function.  Normal right ventricular size with normal right ventricular systolic function.  Mild left atrial enlargement.  Normal central venous pressure (3 mmHg).    Anticoagulant/antiplatelets: Eliquis 5 mg bid   ECG: Atrial fibrillation    Dysphagia or odynophagia:  No  Liver Disease, esophageal disease, or known varices:  No  Upper GI Bleeding: No  Snoring:  No  Sleep Apnea:  No  Prior neck surgery or radiation:  No  Able to move neck in all directions:  Yes  History of anesthetic difficulties:  No  Family history of anesthetic difficulties:  No  Last oral intake: yesterday before midnight  GLP-1 use: None    Platelet count: 197k  INR: 1.0      Past Medical History:   Diagnosis Date    Arthritis     Arthrogryposis     Hepatitis B core antibody positive 06/18/2025    Negative sAg, suggests previous exposure but no chronic/active Hep B. At risk for reactivation with any immunosuppression medication, steroids, chemo, etc.         Hyperlipidemia     Hypertension     PAF (paroxysmal  atrial fibrillation)     Personal history of colonic polyps     PONV (postoperative nausea and vomiting)        Past Surgical History:   Procedure Laterality Date    ANKLE SURGERY Left 1964    Klever procedure    APPLICATION OF SPICA CAST  1962-63    COLONOSCOPY  2014    COLONOSCOPY N/A 7/21/2023    Procedure: COLONOSCOPY;  Surgeon: Reyna Bear MD;  Location: [facility] ENDOSCOPY;  Service: Gastroenterology;  Laterality: N/A;  referral: Dr. Sandoval, suprep, portal / hard stick- ERW  pre call complete, stated he would have a ride -CE    ELBOW SURGERY Bilateral 1965    FRACTURE SURGERY Left 1966    HEMORRHOID SURGERY  2009    HIP ARTHROPLASTY Right 5/27/2019    Procedure: ARTHROPLASTY, HIP;  Surgeon: Willian Covarrubias MD;  Location: [facility] OR;  Service: Orthopedics;  Laterality: Right;  OFIRMEV    JOINT REPLACEMENT Left 1993    w/reconstruction 2002 bilateral hips    KNEE ARTHROSCOPY Right 1986    TONSILLECTOMY      ULNAR NERVE TRANSPOSITION Left 10/23/2020    Procedure: TRANSPOSITION, NERVE, ULNAR;  Surgeon: Claude S. Williams IV, MD;  Location: [facility] OR;  Service: Orthopedics;  Laterality: Left;    wrist release Bilateral 1965       Review of patient's allergies indicates:   Allergen Reactions    Ultram [tramadol] Other (See Comments)     disoriented    Robaxin [methocarbamol]      Pt believes this incited A.fib    Tramadol      Other reaction(s): Dizziness       No current facility-administered medications on file prior to encounter.     Current Outpatient Medications on File Prior to Encounter   Medication Sig    apixaban (ELIQUIS) 5 mg Tab Take 1 tablet (5 mg total) by mouth 2 (two) times daily.    ascorbic acid, vitamin C, (VITAMIN C) 1000 MG tablet Take 1,000 mg by mouth once daily.    aspirin 81 MG Chew Take 81 mg by mouth once daily.    atorvastatin (LIPITOR) 20 MG tablet TAKE 1 TABLET DAILY    b complex vitamins capsule Take 1 capsule by mouth once daily.    calcium-vitamin D tablet 600 mg-200 units Take 1 tablet by  mouth once.    FLAXSEED ORAL Take by mouth.    garlic 1,000 mg Cap Take by mouth.    hydroCHLOROthiazide 12.5 MG Tab TAKE 1 TABLET DAILY    losartan (COZAAR) 100 MG tablet TAKE 1 TABLET DAILY    meloxicam (MOBIC) 7.5 MG tablet TAKE 1 TABLET DAILY AS NEEDED FOR PAIN    om 3/E/linol/ala/oleic/gla/lip (OMEGA 3-6-9 ORAL) Take 1,000 Units by mouth.    potassium 99 mg Tab Take by mouth once.    trandolapriL (MAVIK) 2 MG Tab TAKE 1 TABLET DAILY    verapamiL (CALAN-SR) 180 MG CR tablet TAKE 1 TABLET DAILY    vitamin D (VITAMIN D3) 1000 units Tab Take 1,000 Units by mouth once daily. Takes 125 mcg    albuterol (PROVENTIL/VENTOLIN HFA) 90 mcg/actuation inhaler Inhale 2 puffs into the lungs every 6 (six) hours as needed for Wheezing.    betamethasone dipropionate (DIPROLENE) 0.05 % ointment Apply topically 2 (two) times daily.    pulse oximeter (PULSE OXIMETER) device by Apply Externally route 2 (two) times a day. Use twice daily at 8 AM and 3 PM and record the value in MyChart as directed.     Family History       Problem Relation (Age of Onset)    Basal cell carcinoma Mother, Brother    Diabetes Father    Heart disease Father    Hypertension Mother          Tobacco Use    Smoking status: Never    Smokeless tobacco: Never   Vaping Use    Vaping status: Never Used   Substance and Sexual Activity    Alcohol use: Yes     Comment: occasionally    Drug use: Never    Sexual activity: Not on file     Review of Systems   Constitutional: Negative for diaphoresis, malaise/fatigue, weight gain and weight loss.   HENT:  Negative for nosebleeds.    Eyes:  Negative for vision loss in left eye, vision loss in right eye and visual disturbance.   Cardiovascular:  Negative for chest pain, claudication, dyspnea on exertion, irregular heartbeat, leg swelling, near-syncope, orthopnea, palpitations, paroxysmal nocturnal dyspnea and syncope.   Respiratory:  Negative for cough, shortness of breath, sleep disturbances due to breathing, snoring and  wheezing.    Hematologic/Lymphatic: Negative for bleeding problem. Does not bruise/bleed easily.   Skin:  Negative for poor wound healing and rash.   Musculoskeletal:  Negative for muscle cramps and myalgias.   Gastrointestinal:  Negative for bloating, abdominal pain, diarrhea, heartburn, melena, nausea and vomiting.   Genitourinary:  Negative for hematuria and nocturia.   Neurological:  Negative for brief paralysis, dizziness, headaches, light-headedness, numbness and weakness.   Psychiatric/Behavioral:  Negative for depression.    Allergic/Immunologic: Negative for hives.     Objective:     Vital Signs (Most Recent):  Temp: 97.5 °F (36.4 °C) (07/24/25 0947)  Pulse: (!) 58 (07/24/25 0947)  Resp: 18 (07/24/25 0947)  BP: 109/64 (07/24/25 0948)  SpO2: 96 % (07/24/25 0947) Vital Signs (24h Range):  Temp:  [97.5 °F (36.4 °C)] 97.5 °F (36.4 °C)  Pulse:  [58] 58  Resp:  [18] 18  SpO2:  [96 %] 96 %  BP: (109-110)/(64-68) 109/64     Weight: 94.8 kg (209 lb)  Body mass index is 34.78 kg/m².    SpO2: 96 %       No intake or output data in the 24 hours ending 07/24/25 1012    Lines/Drains/Airways       Peripheral Intravenous Line  Duration             Peripheral IV Single Lumen 07/24/25 0954 24 G Left Hand <1 day                     Physical Exam  Vitals and nursing note reviewed.   Constitutional:       Appearance: He is well-developed. He is not diaphoretic.   HENT:      Head: Normocephalic and atraumatic.   Eyes:      Conjunctiva/sclera: Conjunctivae normal.   Neck:      Vascular: No carotid bruit or JVD.   Cardiovascular:      Rate and Rhythm: Normal rate. Rhythm irregular.      Pulses: Normal pulses and intact distal pulses.      Heart sounds: Normal heart sounds. No murmur heard.     No friction rub. No gallop.   Pulmonary:      Effort: Pulmonary effort is normal.      Breath sounds: Normal breath sounds. No rales.   Chest:      Chest wall: No tenderness.   Abdominal:      General: There is no distension.       Palpations: Abdomen is soft. There is no mass.      Tenderness: There is no abdominal tenderness.   Skin:     General: Skin is warm and dry.      Coloration: Skin is not pale.   Neurological:      Mental Status: He is alert and oriented to person, place, and time.          Significant Labs: All pertinent lab results from the last 24 hours have been reviewed.  Assessment and Plan:     Persistent atrial fibrillation  Here today for ARIN and DCCV.  -The risks, benefits & alternatives of the procedure were explained to the patient.    -The risks of transesophageal echo include but are not limited to:  Dental trauma, esophageal trauma/perforation, bleeding, laryngospasm/brochospasm, aspiration, sore throat/hoarseness, & dislodgement of the endotracheal tube/nasogastric tube (where applicable).    -The risks of moderate sedation include hypotension, respiratory depression, arrhythmias, bronchospasm, & death.    -Prior to procedure, extensive discussion with patient regarding risks and benefits of DCCV at bedside today.   -The patient voices understanding, all questions have been answered, and patient would like to proceed.   -Informed consent was obtained & the patient is agreeable to proceed with the procedure.      Further recommendations per attending addendum        VTE Risk Mitigation (From admission, onward)      None            Jeri Martin PA-C  Cardiology   [facility] Stay Card[facility]

## 2025-07-24 NOTE — SUBJECTIVE & OBJECTIVE
Past Medical History:   Diagnosis Date    Arthritis     Arthrogryposis     Hepatitis B core antibody positive 06/18/2025    Negative sAg, suggests previous exposure but no chronic/active Hep B. At risk for reactivation with any immunosuppression medication, steroids, chemo, etc.         Hyperlipidemia     Hypertension     PAF (paroxysmal atrial fibrillation)     Personal history of colonic polyps     PONV (postoperative nausea and vomiting)        Past Surgical History:   Procedure Laterality Date    ANKLE SURGERY Left 1964    Klever procedure    APPLICATION OF SPICA CAST  1962-63    COLONOSCOPY  2014    COLONOSCOPY N/A 7/21/2023    Procedure: COLONOSCOPY;  Surgeon: Reyna Bear MD;  Location: [facility] ENDOSCOPY;  Service: Gastroenterology;  Laterality: N/A;  referral: Dr. Sandoval, suprep, portal / hard stick- ERW  pre call complete, stated he would have a ride -CE    ELBOW SURGERY Bilateral 1965    FRACTURE SURGERY Left 1966    HEMORRHOID SURGERY  2009    HIP ARTHROPLASTY Right 5/27/2019    Procedure: ARTHROPLASTY, HIP;  Surgeon: Willian Covarrubias MD;  Location: [facility] OR;  Service: Orthopedics;  Laterality: Right;  OFIRMEV    JOINT REPLACEMENT Left 1993    w/reconstruction 2002 bilateral hips    KNEE ARTHROSCOPY Right 1986    TONSILLECTOMY      ULNAR NERVE TRANSPOSITION Left 10/23/2020    Procedure: TRANSPOSITION, NERVE, ULNAR;  Surgeon: Claude S. Williams IV, MD;  Location: [facility] OR;  Service: Orthopedics;  Laterality: Left;    wrist release Bilateral 1965       Review of patient's allergies indicates:   Allergen Reactions    Ultram [tramadol] Other (See Comments)     disoriented    Robaxin [methocarbamol]      Pt believes this incited A.fib    Tramadol      Other reaction(s): Dizziness       No current facility-administered medications on file prior to encounter.     Current Outpatient Medications on File Prior to Encounter   Medication Sig    apixaban (ELIQUIS) 5 mg Tab Take 1 tablet (5 mg total) by mouth 2 (two)  times daily.    ascorbic acid, vitamin C, (VITAMIN C) 1000 MG tablet Take 1,000 mg by mouth once daily.    aspirin 81 MG Chew Take 81 mg by mouth once daily.    atorvastatin (LIPITOR) 20 MG tablet TAKE 1 TABLET DAILY    b complex vitamins capsule Take 1 capsule by mouth once daily.    calcium-vitamin D tablet 600 mg-200 units Take 1 tablet by mouth once.    FLAXSEED ORAL Take by mouth.    garlic 1,000 mg Cap Take by mouth.    hydroCHLOROthiazide 12.5 MG Tab TAKE 1 TABLET DAILY    losartan (COZAAR) 100 MG tablet TAKE 1 TABLET DAILY    meloxicam (MOBIC) 7.5 MG tablet TAKE 1 TABLET DAILY AS NEEDED FOR PAIN    om 3/E/linol/ala/oleic/gla/lip (OMEGA 3-6-9 ORAL) Take 1,000 Units by mouth.    potassium 99 mg Tab Take by mouth once.    trandolapriL (MAVIK) 2 MG Tab TAKE 1 TABLET DAILY    verapamiL (CALAN-SR) 180 MG CR tablet TAKE 1 TABLET DAILY    vitamin D (VITAMIN D3) 1000 units Tab Take 1,000 Units by mouth once daily. Takes 125 mcg    albuterol (PROVENTIL/VENTOLIN HFA) 90 mcg/actuation inhaler Inhale 2 puffs into the lungs every 6 (six) hours as needed for Wheezing.    betamethasone dipropionate (DIPROLENE) 0.05 % ointment Apply topically 2 (two) times daily.    pulse oximeter (PULSE OXIMETER) device by Apply Externally route 2 (two) times a day. Use twice daily at 8 AM and 3 PM and [facility]     Family History       Problem Relation (Age of Onset)    Basal cell carcinoma Mother, Brother    Diabetes Father    Heart disease Father    Hypertension Mother          Tobacco Use    Smoking status: Never    Smokeless tobacco: Never   Vaping Use    Vaping status: Never Used   Substance and Sexual Activity    Alcohol use: Yes     Comment: occasionally    Drug use: Never    Sexual activity: Not on file     Review of Systems   Constitutional: Negative for diaphoresis, malaise/fatigue, weight gain and weight loss.   HENT:  Negative for nosebleeds.    Eyes:  Negative for vision loss in left eye, vision  loss in right eye and visual disturbance.   Cardiovascular:  Negative for chest pain, claudication, dyspnea on exertion, irregular heartbeat, leg swelling, near-syncope, orthopnea, palpitations, paroxysmal nocturnal dyspnea and syncope.   Respiratory:  Negative for cough, shortness of breath, sleep disturbances due to breathing, snoring and wheezing.    Hematologic/Lymphatic: Negative for bleeding problem. Does not bruise/bleed easily.   Skin:  Negative for poor wound healing and rash.   Musculoskeletal:  Negative for muscle cramps and myalgias.   Gastrointestinal:  Negative for bloating, abdominal pain, diarrhea, heartburn, melena, nausea and vomiting.   Genitourinary:  Negative for hematuria and nocturia.   Neurological:  Negative for brief paralysis, dizziness, headaches, light-headedness, numbness and weakness.   Psychiatric/Behavioral:  Negative for depression.    Allergic/Immunologic: Negative for hives.     Objective:     Vital Signs (Most Recent):  Temp: 97.5 °F (36.4 °C) (07/24/25 0947)  Pulse: (!) 58 (07/24/25 0947)  Resp: 18 (07/24/25 0947)  BP: 109/64 (07/24/25 0948)  SpO2: 96 % (07/24/25 0947) Vital Signs (24h Range):  Temp:  [97.5 °F (36.4 °C)] 97.5 °F (36.4 °C)  Pulse:  [58] 58  Resp:  [18] 18  SpO2:  [96 %] 96 %  BP: (109-110)/(64-68) 109/64     Weight: 94.8 kg (209 lb)  Body mass index is 34.78 kg/m².    SpO2: 96 %       No intake or output data in the 24 hours ending 07/24/25 1012    Lines/Drains/Airways       Peripheral Intravenous Line  Duration             Peripheral IV Single Lumen 07/24/25 0954 24 G Left Hand <1 day                     Physical Exam  Vitals and nursing note reviewed.   Constitutional:       Appearance: He is well-developed. He is not diaphoretic.   HENT:      Head: Normocephalic and atraumatic.   Eyes:      Conjunctiva/sclera: Conjunctivae normal.   Neck:      Vascular: No carotid bruit or JVD.   Cardiovascular:      Rate and Rhythm: Normal rate. Rhythm irregular.      Pulses:  Normal pulses and intact distal pulses.      Heart sounds: Normal heart sounds. No murmur heard.     No friction rub. No gallop.   Pulmonary:      Effort: Pulmonary effort is normal.      Breath sounds: Normal breath sounds. No rales.   Chest:      Chest wall: No tenderness.   Abdominal:      General: There is no distension.      Palpations: Abdomen is soft. There is no mass.      Tenderness: There is no abdominal tenderness.   Skin:     General: Skin is warm and dry.      Coloration: Skin is not pale.   Neurological:      Mental Status: He is alert and oriented to person, place, and time.          Significant Labs: All pertinent lab results from the last 24 hours have been reviewed.

## 2025-07-24 NOTE — DISCHARGE INSTRUCTIONS
Medications:  -Continue to take your home medications as listed on your medication list after you are discharged.    New Medications:  No new meds    Diet  -You may resume oral intake after you are discharged, as long you have no swallowing difficulties.    Because you have received sedation for this procedure:  -Limit activity for the remainder of the day.  -Do not smoke for at least 6 hours and until you are fully awake and alert.  -Do not drink alcoholic beverage for 24 hours.  -Do not drive for 24 hours.  -Defer important decision making until the following day.     Go to the Emergency Department if you develop:   -Bleeding  -Weakness or numbness  -Visual, gait or speech disturbance  -New chest pain, palpitations, shortness of breath, rapid heart beat, or fainting  -Fever    Follow up:  -EKG in 1 week.  -Dr. Keenan or Suzanne Ng NP in 1 month. Call or message the office to schedule.      Any need to reschedule or cancel procedures, or any questions regarding your procedures should be addressed directly with the Arrhythmia Department Nurses at the following phone number: [phone].

## 2025-07-24 NOTE — HOSPITAL COURSE
Patient underwent ARIN without evidence of ARUNA thrombus. Proceeded with DCCV, converting from atrial fibrillation to sinus rhythm. Patient tolerated the procedure without any acute complications. Post-DCCV ECG revealed sinus bradycardia with first degree AV block at 58 bpm. Plan to continue all home medications including Verapamil and Eliquis. Instructed to return in 1 week for follow up ECG and in 4 weeks for clinic follow up with Dr. Keenan or Suzanne Ng NP.   Patient was assessed at bedside prior to discharge, they reported feeling well and denied chest discomfort, shortness of breath, palpitations, lightheadedness, or any other acute symptoms. Discharge instructions were discussed with patient and all questions were answered. Patient was discharged home in stable condition.

## 2025-07-24 NOTE — ANESTHESIA POSTPROCEDURE EVALUATION
Anesthesia Post Evaluation    Patient: Junior Beatty    Procedure(s) Performed: Procedure(s) (LRB):  CARDIOVERSION (N/A)  Transesophageal echo (ARIN) intra-procedure log documentation (N/A)    Final Anesthesia Type: general      Patient location during evaluation: PACU  Patient participation: Yes- Able to Participate  Level of consciousness: awake and alert  Post-procedure vital signs: reviewed and stable  Pain management: adequate  Airway patency: patent    PONV status at discharge: No PONV  Anesthetic complications: no      Cardiovascular status: blood pressure returned to baseline  Respiratory status: unassisted  Hydration status: euvolemic  Follow-up not needed.              Vitals Value Taken Time   /66 07/24/25 12:02   Temp 36.5 °C (97.7 °F) 07/24/25 11:50   Pulse 64 07/24/25 12:07   Resp 13 07/24/25 12:07   SpO2 96 % 07/24/25 12:07   Vitals shown include unfiled device data.      No case tracking events are documented in the log.      Pain/Jimmy Score: Jimmy Score: 10 (7/24/2025 11:50 AM)

## 2025-07-24 NOTE — NURSING
Spoke with patient sister in law allison on the phone regarding pickup instructions. Pt sistier in law to drive patient home and will  in the front of the hospital main entrance.

## 2025-07-24 NOTE — ANESTHESIA PREPROCEDURE EVALUATION
07/24/2025  Junior Beatty is a 65 y.o., male.      Pre-op Assessment    I have reviewed the Patient Summary Reports.       I have reviewed the Medications.     Review of Systems  Anesthesia Hx:   History of prior surgery of interest to airway management or planning:            Denies Personal Hx of Anesthesia complications.                    Cardiovascular:     Hypertension    Dysrhythmias atrial fibrillation      hyperlipidemia    NL BiV Fxn                           Hepatic/GI:      Liver Disease,               Musculoskeletal:  Arthritis                   Physical Exam  General: Well nourished    Airway:  Mallampati: III / II  Mouth Opening: Normal  TM Distance: Normal  Tongue: Normal  Neck ROM: Normal ROM    Chest/Lungs:  Normal Respiratory Rate    Heart:  Rate: Normal        Anesthesia Plan  Type of Anesthesia, risks & benefits discussed:    Anesthesia Type: Gen Natural Airway  Intra-op Monitoring Plan: Standard ASA Monitors  Induction:  IV  Informed Consent: Informed consent signed with the Patient and all parties understand the risks and agree with anesthesia plan.  All questions answered.   ASA Score: 3  Day of Surgery Review of History & Physical: H&P Update referred to the surgeon/provider.  Anesthesia Plan Notes: NPO confirmed  No history of anesthesia problems    Ready For Surgery From Anesthesia Perspective.     .

## 2025-07-24 NOTE — ASSESSMENT & PLAN NOTE
Here today for ARIN and DCCV.  -The risks, benefits & alternatives of the procedure were explained to the patient.    -The risks of transesophageal echo include but are not limited to:  Dental trauma, esophageal trauma/perforation, bleeding, laryngospasm/brochospasm, aspiration, sore throat/hoarseness, & dislodgement of the endotracheal tube/nasogastric tube (where applicable).    -The risks of moderate sedation include hypotension, respiratory depression, arrhythmias, bronchospasm, & death.    -Prior to procedure, extensive discussion with patient regarding risks and benefits of DCCV at bedside today.   -The patient voices understanding, all questions have been answered, and patient would like to proceed.   -Informed consent was obtained & the patient is agreeable to proceed with the procedure.      Further recommendations per attending addendum

## 2025-07-24 NOTE — PLAN OF CARE
Pt transferred from  procedure via  stretcher.  Post op orders and assessment initiated. Will continue to monitor.

## 2025-07-24 NOTE — HPI
Mr. Beatty is a 64 year old with a PMHx significant for atrial fibrillation and HTN. He has had paroxysmal atrial fibrillation since 2019. Now with persistent AF since 3/3/25 with minimal change in symptoms. He was started on Eliquis. He presents today for ARIN/DCCV.    TTE 12/23/2020  Techncially challenging study. limited view of the valves.  The left ventricle is normal in size with normal systolic function. The estimated ejection fraction is 65%  Indeterminate left ventricular diastolic function.  Normal right ventricular size with normal right ventricular systolic function.  Mild left atrial enlargement.  Normal central venous pressure (3 mmHg).    Anticoagulant/antiplatelets: Eliquis 5 mg bid   ECG: Atrial fibrillation    Dysphagia or odynophagia:  No  Liver Disease, esophageal disease, or known varices:  No  Upper GI Bleeding: No  Snoring:  No  Sleep Apnea:  No  Prior neck surgery or radiation:  No  Able to move neck in all directions:  Yes  History of anesthetic difficulties:  No  Family history of anesthetic difficulties:  No  Last oral intake: yesterday before midnight  GLP-1 use: None    Platelet count: 197k  INR: 1.0

## 2025-07-24 NOTE — NURSING
IV d/c'd with cath tip intact.  Pt verbalized an understanding of the d/c instructions.  Standing without difficulty with the use of his own personal cane.  VSS.  Denies pain or SOB.  Off unit via wheelchair for discharge to home.  Pt received by his sister in law.

## 2025-07-24 NOTE — DISCHARGE SUMMARY
[facility]  Cardiology  Discharge Summary      Patient Name: Junior Beatty  MRN: [id number]  Admission Date: 7/24/2025  Hospital Length of Stay: 0 days  Discharge Date and Time: 07/24/2025 12:36 PM  Attending Physician: Fermin Keenan MD    Discharging Provider: Jeri Martin PA-C  Primary Care Physician: Tarik Sandoval MD    HPI:   Mr. Beatty is a 64 year old with a PMHx significant for atrial fibrillation and HTN. He has had paroxysmal atrial fibrillation since 2019. Now with persistent AF since 3/3/25 with minimal change in symptoms. He was started on Eliquis. He presents today for ARIN/DCCV.    TTE 12/23/2020  Techncially challenging study. limited view of the valves.  The left ventricle is normal in size with normal systolic function. The estimated ejection fraction is 65%  Indeterminate left ventricular diastolic function.  Normal right ventricular size with normal right ventricular systolic function.  Mild left atrial enlargement.  Normal central venous pressure (3 mmHg).    Anticoagulant/antiplatelets: Eliquis 5 mg bid   ECG: Atrial fibrillation    Dysphagia or odynophagia:  No  Liver Disease, esophageal disease, or known varices:  No  Upper GI Bleeding: No  Snoring:  No  Sleep Apnea:  No  Prior neck surgery or radiation:  No  Able to move neck in all directions:  Yes  History of anesthetic difficulties:  No  Family history of anesthetic difficulties:  No  Last oral intake: yesterday before midnight  GLP-1 use: None    Platelet count: 197k  INR: 1.0      Procedure(s) (LRB):  CARDIOVERSION (N/A)  Transesophageal echo (ARIN) intra-procedure log documentation (N/A)     Indwelling Lines/Drains at time of discharge:  Lines/Drains/Airways       None                   Hospital Course:  Patient underwent ARIN without evidence of ARUNA thrombus. Proceeded with DCCV, converting from atrial fibrillation to sinus rhythm. Patient tolerated the procedure without any acute complications. Post-DCCV  ECG revealed sinus bradycardia with first degree AV block at 58 bpm. Plan to continue all home medications including Verapamil and Eliquis. Instructed to return in 1 week for follow up ECG and in 4 weeks for clinic follow up with Dr. Keenan or Suzanne Ng NP.   Patient was assessed at bedside prior to discharge, they reported feeling well and denied chest discomfort, shortness of breath, palpitations, lightheadedness, or any other acute symptoms. Discharge instructions were discussed with patient and all questions were answered. Patient was discharged home in stable condition.      Goals of Care Treatment Preferences:  Code Status: Full Code      Significant Diagnostic Studies: ARIN - final report pending - prelim no ARUNA thrombus, proceeded with DCCV     Pending Diagnostic Studies:       None            Final Active Diagnoses:    Diagnosis Date Noted POA    Persistent atrial fibrillation [I48.19] 06/07/2019 Yes      Problems Resolved During this Admission:     No new Assessment & Plan notes have been filed under this hospital service since the last note was generated.  Service: Cardiology      Discharged Condition: stable    Disposition: Home or Self Care    Follow Up:   Follow-up Information       [facility] [address]. Go on 7/31/2025.    Specialty: Cardiology  Why: Go to your EKG appointment in one week  Contact information:  [address] [facility]  [facility] [address] [phone]  Additional information:  [facility] - Main Building, [address]rd Floor   Please park in [facility] and use [facility] elevator             Fermin Keenan MD. Schedule an appointment as soon as possible for a visit in 1 month(s).    Specialties: Electrophysiology, Cardiovascular Disease, Cardiology  Why: Follow up with Dr Keenan or one of his nurse practitioners in one month.  Contact information:  [address] [facility]  [facility] [address] [phone]                           Patient  Instructions:   No discharge procedures on file.  Medications:  Reconciled Home Medications:      Medication List        CONTINUE taking these medications      albuterol 90 mcg/actuation inhaler  Commonly known as: PROVENTIL/VENTOLIN HFA  Inhale 2 puffs into the lungs every 6 (six) hours as needed for Wheezing.     ascorbic acid (vitamin C) 1000 MG tablet  Commonly known as: VITAMIN C  Take 1,000 mg by mouth once daily.     aspirin 81 MG Chew  Take 81 mg by mouth once daily.     atorvastatin 20 MG tablet  Commonly known as: LIPITOR  TAKE 1 TABLET DAILY     b complex vitamins capsule  Take 1 capsule by mouth once daily.     betamethasone dipropionate 0.05 % ointment  Commonly known as: BETANATE  Apply topically 2 (two) times daily.     calcium-vitamin D3 600 mg-5 mcg (200 unit) Tab  Commonly known as: CALCIUM 600 + D(3)  Take 1 tablet by mouth once.     ELIQUIS 5 mg Tab  Generic drug: apixaban  Take 1 tablet (5 mg total) by mouth 2 (two) times daily.     FLAXSEED ORAL  Take by mouth.     garlic 1,000 mg Cap  Take by mouth.     hydroCHLOROthiazide 12.5 MG Tab  TAKE 1 TABLET DAILY     losartan 100 MG tablet  Commonly known as: COZAAR  TAKE 1 TABLET DAILY     meloxicam 7.5 MG tablet  Commonly known as: MOBIC  TAKE 1 TABLET DAILY AS NEEDED FOR PAIN     miscellaneous medical supply Kit  by Apply Externally route 2 (two) times a day. Use twice daily at 8 AM and 3 PM and record the value in MyChart as directed.     OMEGA 3-6-9 ORAL  Take 1,000 Units by mouth.     potassium 99 mg Tab  Take by mouth once.     trandolapriL 2 MG Tab  Commonly known as: MAVIK  TAKE 1 TABLET DAILY     verapamiL 180 MG CR tablet  Commonly known as: CALAN-SR  TAKE 1 TABLET DAILY     vitamin D 1000 units Tab  Commonly known as: VITAMIN D3  Take 1,000 Units by mouth once daily. Takes 125 mcg              Time spent on the discharge of patient: 35 minutes    Jeri Martin PA-C  Cardiology  [facility]

## 2025-07-24 NOTE — NURSING
Report from DARRYL Couch.  Awake alert and oriented.  Eating a sandwich and drinking.  Denies pain or SOB.  VSS.  Will update family via phone.

## 2025-07-28 RX ORDER — MELOXICAM 7.5 MG/1
TABLET ORAL
Qty: 90 TABLET | Refills: 3 | OUTPATIENT
Start: 2025-07-28

## 2025-07-28 NOTE — TELEPHONE ENCOUNTER
No care due was identified.  [facility] Embedded Care Due Messages. Reference number: [id number].   7/28/2025 1:34:19 AM CDT

## 2025-07-28 NOTE — TELEPHONE ENCOUNTER
Refill Routing Note   Medication(s) are not appropriate for processing by [facility] for the following reason(s):        Outside of protocol    [facility] action(s):  Route             Appointments  past 12m or future 3m with PCP    Date Provider   Last Visit   3/3/2025 Tarik Sandoval MD   Next Visit   9/2/2025 Tarik Sandoval MD   ED visits in past 90 days: 0        Note composed:2:17 PM 07/28/2025

## 2025-07-31 ENCOUNTER — HOSPITAL ENCOUNTER (OUTPATIENT)
Dept: CARDIOLOGY | Facility: CLINIC | Age: 65
Discharge: HOME | End: 2025-07-31
Payer: COMMERCIAL

## 2025-07-31 DIAGNOSIS — I48.0 PAF (PAROXYSMAL ATRIAL FIBRILLATION): ICD-10-CM

## 2025-07-31 PROCEDURE — 93010 ELECTROCARDIOGRAM REPORT: CPT | Mod: S$GLB,,, | Performed by: INTERNAL MEDICINE

## 2025-08-01 ENCOUNTER — TELEPHONE (OUTPATIENT)
Dept: ELECTROPHYSIOLOGY | Facility: CLINIC | Age: 65
End: 2025-08-01
Payer: COMMERCIAL

## 2025-08-01 ENCOUNTER — PATIENT MESSAGE (OUTPATIENT)
Dept: ELECTROPHYSIOLOGY | Facility: CLINIC | Age: 65
End: 2025-08-01
Payer: COMMERCIAL

## 2025-08-01 LAB
OHS QRS DURATION: 118 MS
OHS QTC CALCULATION: 409 MS

## 2025-08-01 NOTE — TELEPHONE ENCOUNTER
----- Message from DARRYL Acosta sent at 8/1/2025 12:57 PM CDT -----  Regarding: FW: Returning call    ----- Message -----  From: Olesya Pena  Sent: 8/1/2025  12:31 PM CDT  To: [facility] Staff  Subject: Returning call                                   Patient returning call. Please call back @ [phone]. Thank you Olesya

## 2025-08-01 NOTE — TELEPHONE ENCOUNTER
Spoke with pt, reports he started feeling palpitations yesterday and SOB with activity, confirmed he is taking his verapamil 180mg daily and eliquis 5mg BID, will send this information and copy of his post dccv ECG to Dr Keenan for his recommendations

## 2025-08-04 ENCOUNTER — TELEPHONE (OUTPATIENT)
Dept: ELECTROPHYSIOLOGY | Facility: CLINIC | Age: 65
End: 2025-08-04
Payer: COMMERCIAL

## 2025-08-10 DIAGNOSIS — I48.91 ATRIAL FIBRILLATION, UNSPECIFIED TYPE: ICD-10-CM

## 2025-08-13 ENCOUNTER — PATIENT MESSAGE (OUTPATIENT)
Dept: ELECTROPHYSIOLOGY | Facility: CLINIC | Age: 65
End: 2025-08-13
Payer: COMMERCIAL

## 2025-08-19 ENCOUNTER — PATIENT MESSAGE (OUTPATIENT)
Dept: INTERNAL MEDICINE | Facility: CLINIC | Age: 65
End: 2025-08-19
Payer: COMMERCIAL

## 2025-08-19 DIAGNOSIS — I10 ESSENTIAL HYPERTENSION: Primary | ICD-10-CM

## 2025-08-19 DIAGNOSIS — I48.0 PAF (PAROXYSMAL ATRIAL FIBRILLATION): ICD-10-CM

## 2025-08-19 DIAGNOSIS — K76.0 METABOLIC DYSFUNCTION-ASSOCIATED STEATOTIC LIVER DISEASE (MASLD): ICD-10-CM

## 2025-08-19 DIAGNOSIS — E78.5 HYPERLIPIDEMIA, UNSPECIFIED HYPERLIPIDEMIA TYPE: ICD-10-CM

## 2025-08-19 DIAGNOSIS — R73.03 PREDIABETES: ICD-10-CM

## 2025-08-26 ENCOUNTER — LAB VISIT (OUTPATIENT)
Dept: LAB | Facility: HOSPITAL | Age: 65
End: 2025-08-26
Attending: INTERNAL MEDICINE
Payer: COMMERCIAL

## 2025-08-26 ENCOUNTER — NUTRITION (OUTPATIENT)
Dept: NUTRITION | Facility: CLINIC | Age: 65
End: 2025-08-26
Payer: COMMERCIAL

## 2025-08-26 VITALS — WEIGHT: 207.19 LBS | BODY MASS INDEX: 34.48 KG/M2

## 2025-08-26 DIAGNOSIS — Z71.3 NUTRITIONAL COUNSELING: Primary | ICD-10-CM

## 2025-08-26 DIAGNOSIS — E66.812 CLASS 2 SEVERE OBESITY DUE TO EXCESS CALORIES WITH SERIOUS COMORBIDITY AND BODY MASS INDEX (BMI) OF 36.0 TO 36.9 IN ADULT: ICD-10-CM

## 2025-08-26 DIAGNOSIS — E66.01 CLASS 2 SEVERE OBESITY DUE TO EXCESS CALORIES WITH SERIOUS COMORBIDITY AND BODY MASS INDEX (BMI) OF 36.0 TO 36.9 IN ADULT: ICD-10-CM

## 2025-08-26 PROCEDURE — 99999 PR PBB SHADOW E&M-EST. PATIENT-LVL I: CPT | Mod: PBBFAC,,, | Performed by: DIETITIAN, REGISTERED

## 2025-08-26 PROCEDURE — 97803 MED NUTRITION INDIV SUBSEQ: CPT | Mod: S$GLB,,, | Performed by: DIETITIAN, REGISTERED

## 2025-08-27 ENCOUNTER — HOSPITAL ENCOUNTER (OUTPATIENT)
Dept: CARDIOLOGY | Facility: CLINIC | Age: 65
Discharge: HOME | End: 2025-08-27
Payer: COMMERCIAL

## 2025-08-27 ENCOUNTER — OFFICE VISIT (OUTPATIENT)
Dept: ELECTROPHYSIOLOGY | Facility: CLINIC | Age: 65
End: 2025-08-27

## 2025-08-27 VITALS
HEIGHT: 65 IN | SYSTOLIC BLOOD PRESSURE: 115 MMHG | WEIGHT: 214.94 LBS | DIASTOLIC BLOOD PRESSURE: 75 MMHG | BODY MASS INDEX: 35.81 KG/M2 | HEART RATE: 75 BPM

## 2025-08-27 DIAGNOSIS — E11.9 TYPE 2 DIABETES MELLITUS WITHOUT COMPLICATION: ICD-10-CM

## 2025-08-27 DIAGNOSIS — I48.19 PERSISTENT ATRIAL FIBRILLATION: Primary | ICD-10-CM

## 2025-08-27 DIAGNOSIS — E11.9 TYPE 2 DIABETES MELLITUS WITHOUT COMPLICATION, UNSPECIFIED WHETHER LONG TERM INSULIN USE: ICD-10-CM

## 2025-08-27 DIAGNOSIS — I10 ESSENTIAL HYPERTENSION: Chronic | ICD-10-CM

## 2025-08-27 DIAGNOSIS — I48.0 PAROXYSMAL ATRIAL FIBRILLATION WITH RAPID VENTRICULAR RESPONSE: Chronic | ICD-10-CM

## 2025-08-27 LAB
OHS QRS DURATION: 116 MS
OHS QTC CALCULATION: 446 MS

## 2025-08-27 PROCEDURE — 93010 ELECTROCARDIOGRAM REPORT: CPT | Mod: S$GLB,,, | Performed by: INTERNAL MEDICINE

## 2025-08-27 PROCEDURE — 99999 PR PBB SHADOW E&M-EST. PATIENT-LVL IV: CPT | Mod: PBBFAC,,,

## 2025-08-29 ENCOUNTER — TELEPHONE (OUTPATIENT)
Dept: ELECTROPHYSIOLOGY | Facility: CLINIC | Age: 65
End: 2025-08-29
Payer: COMMERCIAL

## 2025-08-29 ENCOUNTER — PATIENT MESSAGE (OUTPATIENT)
Dept: ELECTROPHYSIOLOGY | Facility: CLINIC | Age: 65
End: 2025-08-29
Payer: COMMERCIAL

## 2025-08-29 DIAGNOSIS — I48.19 PERSISTENT ATRIAL FIBRILLATION: Primary | ICD-10-CM

## 2025-09-02 ENCOUNTER — OFFICE VISIT (OUTPATIENT)
Dept: INTERNAL MEDICINE | Facility: CLINIC | Age: 65
End: 2025-09-02
Payer: COMMERCIAL

## 2025-09-02 VITALS
OXYGEN SATURATION: 97 % | WEIGHT: 211.63 LBS | SYSTOLIC BLOOD PRESSURE: 120 MMHG | HEIGHT: 65 IN | DIASTOLIC BLOOD PRESSURE: 64 MMHG | BODY MASS INDEX: 35.26 KG/M2 | HEART RATE: 78 BPM

## 2025-09-02 DIAGNOSIS — I48.91 ATRIAL FIBRILLATION, UNSPECIFIED TYPE: ICD-10-CM

## 2025-09-02 DIAGNOSIS — I10 ESSENTIAL HYPERTENSION: Primary | ICD-10-CM

## 2025-09-02 DIAGNOSIS — Q74.3 ARTHROGRYPOSIS MULTIPLEX CONGENITA: ICD-10-CM

## 2025-09-02 DIAGNOSIS — E11.9 TYPE 2 DIABETES MELLITUS WITHOUT COMPLICATION, WITHOUT LONG-TERM CURRENT USE OF INSULIN: ICD-10-CM

## 2025-09-02 DIAGNOSIS — E66.01 SEVERE OBESITY (BMI 35.0-35.9 WITH COMORBIDITY): ICD-10-CM

## 2025-09-02 DIAGNOSIS — I48.19 PERSISTENT ATRIAL FIBRILLATION: ICD-10-CM

## 2025-09-02 DIAGNOSIS — K76.0 METABOLIC DYSFUNCTION-ASSOCIATED STEATOTIC LIVER DISEASE (MASLD): ICD-10-CM

## 2025-09-02 DIAGNOSIS — E78.5 HYPERLIPIDEMIA, UNSPECIFIED HYPERLIPIDEMIA TYPE: ICD-10-CM

## 2025-09-02 PROCEDURE — 99214 OFFICE O/P EST MOD 30 MIN: CPT | Mod: S$GLB,,, | Performed by: INTERNAL MEDICINE

## 2025-09-02 PROCEDURE — 99999 PR PBB SHADOW E&M-EST. PATIENT-LVL IV: CPT | Mod: PBBFAC,,, | Performed by: INTERNAL MEDICINE

## 2025-09-05 DIAGNOSIS — I48.19 PERSISTENT ATRIAL FIBRILLATION: Primary | ICD-10-CM

## (undated) DEVICE — SUT MCRYL PLUS 4-0 PS2 27IN

## (undated) DEVICE — SEE MEDLINE ITEM 152515

## (undated) DEVICE — POSITIONER IV ARMBOARD FOAM

## (undated) DEVICE — TRAY FOLEY 16FR INFECTION CONT

## (undated) DEVICE — SOL 9P NACL IRR PIC IL

## (undated) DEVICE — DRAPE INCISE IOBAN 2 23X17IN

## (undated) DEVICE — SPLINT PLASTER FAST SET 5X30IN

## (undated) DEVICE — CONTAINER SPECIMEN STRL 4OZ

## (undated) DEVICE — GAUZE SPONGE 4X4 12PLY

## (undated) DEVICE — SOL IRR NACL .9% 3000ML

## (undated) DEVICE — NDL 21GA X1 1/2 REG BEVEL

## (undated) DEVICE — SEE MEDLINE ITEM 157117

## (undated) DEVICE — UNDERGLOVES BIOGEL PI SIZE 8.5

## (undated) DEVICE — Device

## (undated) DEVICE — PAD DEFIB CADENCE ADULT R2

## (undated) DEVICE — DRESSING AQUACEL AG 3.5X10IN

## (undated) DEVICE — DRESSING XEROFORM FOIL PK 1X8

## (undated) DEVICE — SEE MEDLINE ITEM 153151

## (undated) DEVICE — DRESSING LEUKOPLAST FLEX 1X3IN

## (undated) DEVICE — GLOVE BIOGEL SKINSENSE PI 7.0

## (undated) DEVICE — PULSAVAC ZIMMER

## (undated) DEVICE — GLOVE BIOGEL SKINSENSE PI 7.5

## (undated) DEVICE — SEE MEDLINE ITEM 157131

## (undated) DEVICE — DRESSING TRANS 4X10 TEGADERM

## (undated) DEVICE — NDL HYPO REG 25G X 1 1/2

## (undated) DEVICE — SUT ETHIBOND EXCEL 5-0 V-40

## (undated) DEVICE — ADHESIVE DERMABOND ADVANCED

## (undated) DEVICE — SPONGE COTTON TRAY 4X4IN

## (undated) DEVICE — UNDERGLOVES BIOGEL PI SIZE 7.5

## (undated) DEVICE — PAD UNDERPAD 30X30

## (undated) DEVICE — APPLICATOR CHLORAPREP ORN 26ML

## (undated) DEVICE — BLADE SURG STAINLESS STEEL #15

## (undated) DEVICE — PAD CAST SPECIALIST STRL 4

## (undated) DEVICE — STAPLER SKIN PROXIMATE WIDE

## (undated) DEVICE — TOURNIQUET SB QC DP 18X4IN

## (undated) DEVICE — ALCOHOL 70% ISOP W/GREEN 16OZ

## (undated) DEVICE — BLADE SAG DUAL 18MMX1.27MMX90M

## (undated) DEVICE — SPONGE GAUZE 16PLY 4X4

## (undated) DEVICE — CORD FOR BIPOLAR FORCEPS 12

## (undated) DEVICE — SHEET HIP ABSORB CIRC ELAS 8

## (undated) DEVICE — COVER MAYO STAND REINFRCD 30

## (undated) DEVICE — SUT VICRYL+ 1 CTX 18IN VIOL

## (undated) DEVICE — SUT VICRYL CTD 2-0 GI 27 SH

## (undated) DEVICE — PAD ABD 8X10 STERILE

## (undated) DEVICE — SUT VICRYL PLUS 2-0 CT1 18

## (undated) DEVICE — ELECTRODE REM PLYHSV RETURN 9

## (undated) DEVICE — MASK FLYTE HOOD PEEL AWAY

## (undated) DEVICE — PILLOW ABDUCTION FOAM MED

## (undated) DEVICE — SLING ARM SIZE LARGE

## (undated) DEVICE — SYR B-D DISP CONTROL 10CC100/C

## (undated) DEVICE — UNDERGLOVES BIOGEL PI SZ 7 LF

## (undated) DEVICE — DRAPE CAMERA/VIDEO 5 X 96

## (undated) DEVICE — DRAPE STERI INSTRUMENT 1018

## (undated) DEVICE — UNDERGLOVES BIOGEL PI SIZE 8

## (undated) DEVICE — BUCKET PLASTER DISPOSABLE

## (undated) DEVICE — COVER BACK TABLE 72X21

## (undated) DEVICE — PACK UPPER EXTREMITY BAPTIST

## (undated) DEVICE — SUT STRATAFIX PDS 1 CTX 18IN

## (undated) DEVICE — UNDERGLOVE BIOGEL PI SZ 6.5 LF

## (undated) DEVICE — DRAPE STERI U-SHAPED 47X51IN

## (undated) DEVICE — SUT PROLENE 4-0 MONO 18IN

## (undated) DEVICE — SYR 30CC LUER LOCK

## (undated) DEVICE — GLOVE BIOGEL SKINSENSE PI 8.5

## (undated) DEVICE — SUT VICRYL 3-0 27 SH

## (undated) DEVICE — DRESSING TRANS 4X4 TEGADERM

## (undated) DEVICE — DEV-O-LOOPS MAXI RED